# Patient Record
Sex: FEMALE | Race: WHITE | Employment: FULL TIME | RURAL
[De-identification: names, ages, dates, MRNs, and addresses within clinical notes are randomized per-mention and may not be internally consistent; named-entity substitution may affect disease eponyms.]

---

## 2017-01-05 DIAGNOSIS — F90.9 ADULT ADHD: Primary | ICD-10-CM

## 2017-02-01 DIAGNOSIS — F90.9 ADULT ADHD: ICD-10-CM

## 2017-05-22 ENCOUNTER — TELEPHONE (OUTPATIENT)
Dept: FAMILY MEDICINE CLINIC | Age: 37
End: 2017-05-22

## 2017-05-22 RX ORDER — BETAMETHASONE DIPROPIONATE 0.5 MG/G
LOTION TOPICAL 2 TIMES DAILY
Qty: 60 ML | Refills: 0 | Status: SHIPPED | OUTPATIENT
Start: 2017-05-22 | End: 2017-09-28

## 2017-09-28 ENCOUNTER — OFFICE VISIT (OUTPATIENT)
Dept: OBGYN CLINIC | Age: 37
End: 2017-09-28

## 2017-09-28 VITALS
WEIGHT: 171.8 LBS | HEIGHT: 65 IN | SYSTOLIC BLOOD PRESSURE: 140 MMHG | BODY MASS INDEX: 28.62 KG/M2 | DIASTOLIC BLOOD PRESSURE: 92 MMHG

## 2017-09-28 DIAGNOSIS — L70.0 ACNE VULGARIS: ICD-10-CM

## 2017-09-28 DIAGNOSIS — Z01.419 ENCOUNTER FOR GYNECOLOGICAL EXAMINATION (GENERAL) (ROUTINE) WITHOUT ABNORMAL FINDINGS: Primary | ICD-10-CM

## 2017-09-28 RX ORDER — DESOGESTREL AND ETHINYL ESTRADIOL 0.15-0.03
1 KIT ORAL DAILY
Qty: 3 PACKAGE | Refills: 4 | Status: SHIPPED | OUTPATIENT
Start: 2017-09-28 | End: 2017-10-17

## 2017-09-28 NOTE — PROGRESS NOTES
Giuseppe Lam is a ,  40 y.o. female [facility] whose Patient's last menstrual period was 2017 (approximate). who presents for her annual checkup. She is having no significant problems. Concerned about increasing acne. With regard to the Gardisil vaccine, she is older than the FDA approved age to receive it. Menstrual status:    Her periods are moderate in flow. She is using three to five pads or tampons per day, usually regular. She denies dysmenorrhea. She reports no premenstrual symptoms. Contraception:    The current method of family planning is condoms always and She would like to discuss contraception. Sexual history:    She  reports that she currently engages in sexual activity and has had male partners. She reports using the following method of birth control/protection: Condom. Medical conditions:    Since her last annual GYN exam about 2016 ago, she has not the following changes in her health history: none. Pap and Mammogram History:    Her most recent Pap smear was abnormal, ASCUS/-HPV obtained 2016 year(s) ago. The patient has never had a mammogram. She would like to discuss if she needs to get a baseline mammogram this year or next year. The patient does have a family history of breast cancer. Patients mother. History reviewed. No pertinent past medical history. History reviewed. No pertinent surgical history. Current Outpatient Prescriptions   Medication Sig Dispense Refill    cetirizine (ZYRTEC) 10 mg tablet Take 1 Tab by mouth daily. 30 Tab 1    betamethasone dipropionate (DIPROLENE) 0.05 % topical lotion Apply  to affected area two (2) times a day. 60 mL 0    norethindrone-e estradiol-iron (LOMEDIA 24 FE) 1 mg-20 mcg (24)/75 mg (4) tab Take 1 Tab by mouth daily.  28 Tab 2     Allergies: Ceclor [cefaclor] and Penicillin g   Social History     Social History    Marital status:      Spouse name: N/A    Number of children: N/A    Years of education: N/A     Occupational History    Not on file. Social History Main Topics    Smoking status: Former Smoker     Packs/day: 0.25     Years: 10.00     Types: Cigarettes     Quit date: 6/16/2015    Smokeless tobacco: Never Used    Alcohol use No    Drug use: No    Sexual activity: Yes     Partners: Male     Birth control/ protection: Condom     Other Topics Concern    Not on file     Social History Narrative     Tobacco History:  reports that she quit smoking about 2 years ago. Her smoking use included Cigarettes. She has a 2.50 pack-year smoking history. She has never used smokeless tobacco.  Alcohol Abuse:  reports that she does not drink alcohol. Drug Abuse:  reports that she does not use illicit drugs.     Patient Active Problem List   Diagnosis Code    Contraception management Z30.9       Review of Systems - History obtained from the patient  Constitutional: negative for weight loss, fever, night sweats  HEENT: negative for hearing loss, earache, congestion, snoring, sorethroat  CV: negative for chest pain, palpitations, edema  Resp: negative for cough, shortness of breath, wheezing  GI: negative for change in bowel habits, abdominal pain, black or bloody stools  : negative for frequency, dysuria, hematuria, vaginal discharge  MSK: negative for back pain, joint pain, muscle pain  Breast: negative for breast lumps, nipple discharge, galactorrhea  Skin :negative for itching, rash, hives  Neuro: negative for dizziness, headache, confusion, weakness  Psych: negative for anxiety, depression, change in mood  Heme/lymph: negative for bleeding, bruising, pallor    Physical Exam    Visit Vitals    BP (!) 140/92    Ht 5' 5\" (1.651 m)    Wt 171 lb 12.8 oz (77.9 kg)    LMP 09/01/2017 (Approximate)    BMI 28.59 kg/m2       Constitutional  · Appearance: well-nourished, well developed, alert, in no acute distress    HENT  · Head and Face: appears normal    Neck  · Inspection/Palpation: normal appearance, no masses or tenderness  · Lymph Nodes: no lymphadenopathy present  · Thyroid: gland size normal, nontender, no nodules or masses present on palpation    Chest  · Respiratory Effort: breathing normal  · Auscultation: normal breath sounds    Cardiovascular  · Heart:  · Auscultation: regular rate and rhythm without murmur    Breasts  · Inspection of Breasts: breasts symmetrical, no skin changes, no discharge present, nipple appearance normal, no skin retraction present  · Palpation of Breasts and Axillae: no masses present on palpation, no breast tenderness  · Axillary Lymph Nodes: no lymphadenopathy present    Gastrointestinal  · Abdominal Examination: abdomen non-tender to palpation, normal bowel sounds, no masses present  · Liver and spleen: no hepatomegaly present, spleen not palpable  · Hernias: no hernias identified    Genitourinary  · External Genitalia: normal appearance for age, no discharge present, no tenderness present, no inflammatory lesions present, no masses present, no atrophy present  · Vagina: normal vaginal vault without central or paravaginal defects, no discharge present, no inflammatory lesions present, no masses present  · Bladder: non-tender to palpation  · Urethra: appears normal  · Cervix: normal   · Uterus: normal size, shape and consistency  · Adnexa: no adnexal tenderness present, no adnexal masses present  · Perineum: perineum within normal limits, no evidence of trauma, no rashes or skin lesions present  · Anus: anus within normal limits, no hemorrhoids present  · Inguinal Lymph Nodes: no lymphadenopathy present    Skin  · General Inspection: no rash, no lesions identified    Neurologic/Psychiatric  · Mental Status:  · Orientation: grossly oriented to person, place and time  · Mood and Affect: mood normal, affect appropriate    .   Assessment:  Routine gynecologic examination  Her current medical status is satisfactory with no evidence of significant gynecologic issues.   Acne  ASCUS HPV neg 2016  Plan:  Counseled re: diet, exercise, healthy lifestyle  Return for yearly wellness visits  Start OC

## 2017-09-28 NOTE — PATIENT INSTRUCTIONS
Breast Self-Exam: Care Instructions  Your Care Instructions  A breast self-exam is when you check your breasts for lumps or changes. This regular exam helps you learn how your breasts normally look and feel. Most breast problems or changes are not because of cancer. Breast self-exam is not a substitute for a mammogram. Having regular breast exams by your doctor and regular mammograms improve your chances of finding any problems with your breasts. Some women set a time each month to do a step-by-step breast self-exam. Other women like a less formal system. They might look at their breasts as they brush their teeth, or feel their breasts once in a while in the shower. If you notice a change in your breast, tell your doctor. Follow-up care is a key part of your treatment and safety. Be sure to make and go to all appointments, and call your doctor if you are having problems. Its also a good idea to know your test results and keep a list of the medicines you take. How do you do a breast self-exam?  · The best time to examine your breasts is usually one week after your menstrual period begins. Your breasts should not be tender then. If you do not have periods, you might do your exam on a day of the month that is easy to remember. · To examine your breasts:  ¨ Remove all your clothes above the waist and lie down. When you are lying down, your breast tissue spreads evenly over your chest wall, which makes it easier to feel all your breast tissue. ¨ Use the pads--not the fingertips--of the 3 middle fingers of your left hand to check your right breast. Move your fingers slowly in small coin-sized circles that overlap. ¨ Use three levels of pressure to feel of all your breast tissue. Use light pressure to feel the tissue close to the skin surface. Use medium pressure to feel a little deeper. Use firm pressure to feel your tissue close to your breastbone and ribs.  Use each pressure level to feel your breast tissue before moving on to the next spot. ¨ Check your entire breast, moving up and down as if following a strip from the collarbone to the bra line, and from the armpit to the ribs. Repeat until you have covered the entire breast.  ¨ Repeat this procedure for your left breast, using the pads of the 3 middle fingers of your right hand. · To examine your breasts while in the shower:  ¨ Place one arm over your head and lightly soap your breast on that side. ¨ Using the pads of your fingers, gently move your hand over your breast (in the strip pattern described above), feeling carefully for any lumps or changes. ¨ Repeat for the other breast.  · Have your doctor inspect anything you notice to see if you need further testing. Where can you learn more? Go to http://mary kay-yung.info/. Enter P148 in the search box to learn more about \"Breast Self-Exam: Care Instructions. \"  Current as of: July 26, 2016  Content Version: 11.3  © 0518-8667 [employer]. Care instructions adapted under license by [employer] (which disclaims liability or warranty for this information). If you have questions about a medical condition or this instruction, always ask your healthcare professional. [place] [address] any warranty or liability for your use of this information.

## 2017-10-01 LAB
CHLAMYDIA, EXTERNAL: NEGATIVE
N. GONORRHEA, EXTERNAL: NEGATIVE

## 2017-10-17 ENCOUNTER — INITIAL PRENATAL (OUTPATIENT)
Dept: FAMILY MEDICINE CLINIC | Age: 37
End: 2017-10-17

## 2017-10-17 VITALS
WEIGHT: 188 LBS | BODY MASS INDEX: 31.32 KG/M2 | HEIGHT: 65 IN | SYSTOLIC BLOOD PRESSURE: 123 MMHG | TEMPERATURE: 97.1 F | DIASTOLIC BLOOD PRESSURE: 75 MMHG | RESPIRATION RATE: 20 BRPM | HEART RATE: 77 BPM | OXYGEN SATURATION: 98 %

## 2017-10-17 DIAGNOSIS — K21.9 GASTROESOPHAGEAL REFLUX DISEASE WITHOUT ESOPHAGITIS: ICD-10-CM

## 2017-10-17 DIAGNOSIS — Z3A.01 LESS THAN 8 WEEKS GESTATION OF PREGNANCY: ICD-10-CM

## 2017-10-17 DIAGNOSIS — Z3A.01 LESS THAN 8 WEEKS GESTATION OF PREGNANCY: Primary | ICD-10-CM

## 2017-10-17 DIAGNOSIS — O09.521 SUPERVISION OF ELDERLY MULTIGRAVIDA IN FIRST TRIMESTER: ICD-10-CM

## 2017-10-17 DIAGNOSIS — Z87.59 HISTORY OF PRE-ECLAMPSIA: ICD-10-CM

## 2017-10-17 DIAGNOSIS — Z71.6 ENCOUNTER FOR TOBACCO USE CESSATION COUNSELING: ICD-10-CM

## 2017-10-17 LAB
BILIRUB UR QL STRIP: NEGATIVE
GLUCOSE UR-MCNC: NEGATIVE MG/DL
KETONES P FAST UR STRIP-MCNC: NEGATIVE MG/DL
PH UR STRIP: 5.5 [PH] (ref 4.6–8)
PROT UR QL STRIP: NEGATIVE MG/DL
SP GR UR STRIP: 1.03 (ref 1–1.03)
UA UROBILINOGEN AMB POC: NORMAL (ref 0.2–1)
URINALYSIS CLARITY POC: CLEAR
URINALYSIS COLOR POC: YELLOW
URINE BLOOD POC: NEGATIVE
URINE LEUKOCYTES POC: NORMAL
URINE NITRITES POC: NEGATIVE

## 2017-10-17 RX ORDER — RANITIDINE HCL 75 MG
75 TABLET ORAL 2 TIMES DAILY
Qty: 30 TAB | Refills: 3 | Status: SHIPPED | OUTPATIENT
Start: 2017-10-17 | End: 2018-07-25

## 2017-10-17 NOTE — PATIENT INSTRUCTIONS
Weeks 6 to 10 of Your Pregnancy: Care Instructions  Your Care Instructions    Congratulations on your pregnancy. This is an exciting and important time for you. During the first 6 to 10 weeks of your pregnancy, your body goes through many changes. Your baby grows very fast, even though you cannot feel it yet. You may start to notice that you feel different, both in your body and your emotions. Because each woman's pregnancy is unique, there is no right way to feel. You may feel the healthiest you have ever been, or you may feel tired or sick to your stomach (\"morning sickness\"). These early weeks are a time to make healthy choices and to eat the best foods for you and your baby. This care sheet will give you some ideas. This is also a good time to think about birth defects testing. These are tests done during pregnancy to look for possible problems with the baby. First trimester tests for birth defects can be done between 8 and 17 weeks of pregnancy, depending on the test. Talk with your doctor about what kinds of tests are available. Follow-up care is a key part of your treatment and safety. Be sure to make and go to all appointments, and call your doctor if you are having problems. It's also a good idea to know your test results and keep a list of the medicines you take. How can you care for yourself at home? Eat well  · Eat at least 3 meals and 2 healthy snacks every day. Eat fresh, whole foods, including:  ¨ 7 or more servings of bread, tortillas, cereal, rice, pasta, or oatmeal.  ¨ 3 or more servings of vegetables, especially leafy green vegetables. ¨ 2 or more servings of fruits. ¨ 3 or more servings of milk, yogurt, or cheese. ¨ 2 or more servings of meat, turkey, chicken, fish, eggs, or dried beans. · Drink plenty of fluids, especially water. Avoid sodas and other sweetened drinks. · Choose foods that have important vitamins for your baby, such as calcium, iron, and folate.   ¨ Dairy products, tofu, canned fish with bones, almonds, broccoli, dark leafy greens, corn tortillas, and fortified orange juice are good sources of calcium. ¨ Beef, poultry, liver, spinach, lentils, dried beans, fortified cereals, and dried fruits are rich in iron. ¨ Dark leafy greens, broccoli, asparagus, liver, fortified cereals, orange juice, peanuts, and almonds are good sources of folate. · Avoid foods that could harm your baby. ¨ Do not eat raw or undercooked meat, chicken, or fish (such as sushi or raw oysters). ¨ Do not eat raw eggs or foods that contain raw eggs, such as Caesar dressing. ¨ Do not eat soft cheeses and unpasteurized dairy foods, such as Brie, feta, or blue cheese. ¨ Do not eat fish that contains a lot of mercury, such as shark, swordfish, tilefish, or koko mackerel. Do not eat more than 6 ounces of tuna each week. ¨ Do not eat raw sprouts, especially alfalfa sprouts. ¨ Cut down on caffeine, such as coffee, tea, and cola. Protect yourself and your baby  · Do not touch behzad litter or cat feces. They can cause an infection that could harm your baby. · High body temperature can be harmful to your baby. So if you want to use a sauna or hot tub, be sure to talk to your doctor about how to use it safely. [place] with morning sickness  · Sip small amounts of water, juices, or shakes. Try drinking between meals, not with meals. · Eat 5 or 6 small meals a day. Try dry toast or crackers when you first get up, and eat breakfast a little later. · Avoid spicy, greasy, and fatty foods. · When you feel sick, open your windows or go for a short walk to get fresh air. · Try nausea wristbands. These help some women. · Tell your doctor if you think your prenatal vitamins make you sick. Where can you learn more? Go to http://eri.info/. Enter G112 in the search box to learn more about \"Weeks 6 to 10 of Your Pregnancy: Care Instructions. \"  Current as of: March 16, 2017  Content Version: 11.3  © 7675-8827 [employer]. Care instructions adapted under license by [employer] (which disclaims liability or warranty for this information). If you have questions about a medical condition or this instruction, always ask your healthcare professional. [address] any warranty or liability for your use of this information. Gastroesophageal Reflux Disease (GERD): Care Instructions  Your Care Instructions    Gastroesophageal reflux disease (GERD) is the backward flow of stomach acid into the esophagus. The esophagus is the tube that leads from your throat to your stomach. A one-way valve prevents the stomach acid from moving up into this tube. When you have GERD, this valve does not close tightly enough. If you have mild GERD symptoms including heartburn, you may be able to control the problem with antacids or over-the-counter medicine. Changing your diet, losing weight, and making other lifestyle changes can also help reduce symptoms. Follow-up care is a key part of your treatment and safety. Be sure to make and go to all appointments, and call your doctor if you are having problems. Its also a good idea to know your test results and keep a list of the medicines you take. How can you care for yourself at home? · Take your medicines exactly as prescribed. Call your doctor if you think you are having a problem with your medicine. · Your doctor may recommend over-the-counter medicine. For mild or occasional indigestion, antacids, such as Tums, Gaviscon, Mylanta, or Maalox, may help. Your doctor also may recommend over-the-counter acid reducers, such as Pepcid AC, Tagamet HB, Zantac 75, or Prilosec. Read and follow all instructions on the label. If you use these medicines often, talk with your doctor. · Change your eating habits. ¨ Its best to eat several small meals instead of two or three large meals.   ¨ After you eat, wait 2 to 3 hours before you lie down. ¨ Chocolate, mint, and alcohol can make GERD worse. ¨ Spicy foods, foods that have a lot of acid (like tomatoes and oranges), and coffee can make GERD symptoms worse in some people. If your symptoms are worse after you eat a certain food, you may want to stop eating that food to see if your symptoms get better. · Do not smoke or chew tobacco. Smoking can make GERD worse. If you need help quitting, talk to your doctor about stop-smoking programs and medicines. These can increase your chances of quitting for good. · If you have GERD symptoms at night, raise the head of your bed 6 to 8 inches by putting the frame on blocks or placing a foam wedge under the head of your mattress. (Adding extra pillows does not work.)  · Do not wear tight clothing around your middle. · Lose weight if you need to. Losing just 5 to 10 pounds can help. When should you call for help? Call your doctor now or seek immediate medical care if:  · You have new or different belly pain. · Your stools are black and tarlike or have streaks of blood. Watch closely for changes in your health, and be sure to contact your doctor if:  · Your symptoms have not improved after 2 days. · Food seems to catch in your throat or chest.  Where can you learn more? Go to http://mary kay-yung.info/. Enter O333 in the search box to learn more about \"Gastroesophageal Reflux Disease (GERD): Care Instructions. \"  Current as of: August 9, 2016  Content Version: 11.3  © 9142-7185 [facility]. Care instructions adapted under license by [employer] (which disclaims liability or warranty for this information). If you have questions about a medical condition or this instruction, always ask your healthcare professional. [address] any warranty or liability for your use of this information.

## 2017-10-17 NOTE — MR AVS SNAPSHOT
Visit Information Date & Time Provider Department Dept. Phone Encounter #  
 10/17/2017  2:50 PM Dania Bustillos  [facility] [phone] [id number] Follow-up Instructions Return in about 4 weeks (around 11/14/2017) for Routine ob visit blood work this visit along with panorama. Your Appointments 10/17/2017  2:50 PM  
NEW OB Patient with Dania Bustillos MD  
[facility] ([facility]) Appt Note: Initial Prenatal  
 [address] [address]  
[place] [address] [address] Upcoming Health Maintenance Date Due DTaP/Tdap/Td series (2 - Td) 12/28/2020 PAP AKA CERVICAL CYTOLOGY 1/7/2021 Allergies as of 10/17/2017  Review Complete On: 10/17/2017 By: Madelin Liriano Severity Noted Reaction Type Reactions Ceclor [Cefaclor]  04/18/2012    Hives Penicillin G  04/18/2012    Hives Current Immunizations  Reviewed on 5/29/2013 Name Date Influenza Vaccine 10/3/2017 MMR Vaccine 3/25/2011 Tdap 12/28/2010 Not reviewed this visit You Were Diagnosed With   
  
 Codes Comments Less than 8 weeks gestation of pregnancy    -  Primary ICD-10-CM: Z3A.01 
ICD-9-CM: V22.2 Supervision of elderly primigravida (>=28years old at delivery), first trimester     ICD-10-CM: O09.511 ICD-9-CM: V23.81 Gastroesophageal reflux disease without esophagitis     ICD-10-CM: K21.9 ICD-9-CM: 530.81 Vitals BP Pulse Temp Resp Height(growth percentile) Weight(growth percentile) 123/75 77 97.1 °F (36.2 °C) (Oral) 20 5' 5\" (1.651 m) 188 lb (85.3 kg) LMP SpO2 BMI OB Status Smoking Status 09/01/2017 (Approximate) 98% 31.28 kg/m2 Pregnant Former Smoker Vitals History BMI and BSA Data Body Mass Index Body Surface Area  
 31.28 kg/m 2 1.98 m 2 Preferred Pharmacy Pharmacy Name Phone [facility] [address] [place] [address] [phone] Your Updated Medication List  
  
   
This list is accurate as of: 10/17/17  1:10 PM.  Always use your most recent med list.  
  
  
  
  
 cetirizine 10 mg tablet Commonly known as:  ZYRTEC Take 1 Tab by mouth daily. desogestrel-ethinyl estradiol 0.15-0.03 mg Tab Commonly known as:  DESOGEN Take 1 Tab by mouth daily for 90 days. PRENATAL VITAMIN PO Take  by mouth. raNITIdine 75 mg tablet Commonly known as:  ZANTAC Take 1 Tab by mouth two (2) times a day. Prescriptions Sent to Pharmacy Refills  
 raNITIdine (ZANTAC) 75 mg tablet 3 Sig: Take 1 Tab by mouth two (2) times a day. Class: Normal  
 Pharmacy: [address] [place] [address] Ph #: [phone] Route: Oral  
  
We Performed the Following AMB POC URINALYSIS DIP STICK AUTO W/O MICRO [04895 CPT(R)] AMB POC US OB < 14 WKS, 1ST GESTATION [21048 CPT(R)] ANTIBODY SCREEN [id number] CPT(R)] CBC W/O DIFF [31216 CPT(R)] CULTURE, URINE [id number] CPT(R)] HIV 1/2 AG/AB, 4TH GENERATION,W RFLX CONFIRM [[id number] Custom] [address] [id number] Custom] RUBELLA AB, IGG [id number] CPT(R)] T PALLIDUM AB [[id number] Custom] TYPE & SCREEN [[id number] Custom] VZV AB, IGG [id number] CPT(R)] Follow-up Instructions Return in about 4 weeks (around 11/14/2017) for Routine ob visit blood work this visit along with panorama. To-Do List   
 10/17/2017 Lab:  HEP B SURFACE AG Patient Instructions Weeks 6 to 10 of Your Pregnancy: Care Instructions Your Care Instructions Congratulations on your pregnancy. This is an exciting and important time for you. During the first 6 to 10 weeks of your pregnancy, your body goes through many changes.  Your baby grows very fast, even though you cannot feel it yet. You may start to notice that you feel different, both in your body and your emotions. Because each woman's pregnancy is unique, there is no right way to feel. You may feel the healthiest you have ever been, or you may feel tired or sick to your stomach (\"morning sickness\"). These early weeks are a time to make healthy choices and to eat the best foods for you and your baby. This care sheet will give you some ideas. This is also a good time to think about birth defects testing. These are tests done during pregnancy to look for possible problems with the baby. First trimester tests for birth defects can be done between 8 and 17 weeks of pregnancy, depending on the test. Talk with your doctor about what kinds of tests are available. Follow-up care is a key part of your treatment and safety. Be sure to make and go to all appointments, and call your doctor if you are having problems. It's also a good idea to know your test results and keep a list of the medicines you take. How can you care for yourself at home? Eat well · Eat at least 3 meals and 2 healthy snacks every day. Eat fresh, whole foods, including: ¨ 7 or more servings of bread, tortillas, cereal, rice, pasta, or oatmeal. 
¨ 3 or more servings of vegetables, especially leafy green vegetables. ¨ 2 or more servings of fruits. ¨ 3 or more servings of milk, yogurt, or cheese. ¨ 2 or more servings of meat, turkey, chicken, fish, eggs, or dried beans. · Drink plenty of fluids, especially water. Avoid sodas and other sweetened drinks. · Choose foods that have important vitamins for your baby, such as calcium, iron, and folate. ¨ Dairy products, tofu, canned fish with bones, almonds, broccoli, dark leafy greens, corn tortillas, and fortified orange juice are good sources of calcium. ¨ Beef, poultry, liver, spinach, lentils, dried beans, fortified cereals, and dried fruits are rich in iron. ¨ Dark leafy greens, broccoli, asparagus, liver, fortified cereals, orange juice, peanuts, and almonds are good sources of folate. · Avoid foods that could harm your baby. ¨ Do not eat raw or undercooked meat, chicken, or fish (such as sushi or raw oysters). ¨ Do not eat raw eggs or foods that contain raw eggs, such as Caesar dressing. ¨ Do not eat soft cheeses and unpasteurized dairy foods, such as Brie, feta, or blue cheese. ¨ Do not eat fish that contains a lot of mercury, such as shark, swordfish, tilefish, or koko mackerel. Do not eat more than 6 ounces of tuna each week. ¨ Do not eat raw sprouts, especially alfalfa sprouts. ¨ Cut down on caffeine, such as coffee, tea, and cola. Protect yourself and your baby · Do not touch behzad litter or cat feces. They can cause an infection that could harm your baby. · High body temperature can be harmful to your baby. So if you want to use a sauna or hot tub, be sure to talk to your doctor about how to use it safely. Steger with morning sickness · Sip small amounts of water, juices, or shakes. Try drinking between meals, not with meals. · Eat 5 or 6 small meals a day. Try dry toast or crackers when you first get up, and eat breakfast a little later. · Avoid spicy, greasy, and fatty foods. · When you feel sick, open your windows or go for a short walk to get fresh air. · Try nausea wristbands. These help some women. · Tell your doctor if you think your prenatal vitamins make you sick. Where can you learn more? Go to http://mary kay-yung.info/. Enter G112 in the search box to learn more about \"Weeks 6 to 10 of Your Pregnancy: Care Instructions. \" Current as of: March 16, 2017 Content Version: 11.3 © 5585-2206 [employer]. Care instructions adapted under license by [employer] (which disclaims liability or warranty for this information).  If you have questions about a medical condition or this instruction, always ask your healthcare professional. [place] [address] any warranty or liability for your use of this information. Gastroesophageal Reflux Disease (GERD): Care Instructions Your Care Instructions Gastroesophageal reflux disease (GERD) is the backward flow of stomach acid into the esophagus. The esophagus is the tube that leads from your throat to your stomach. A one-way valve prevents the stomach acid from moving up into this tube. When you have GERD, this valve does not close tightly enough. If you have mild GERD symptoms including heartburn, you may be able to control the problem with antacids or over-the-counter medicine. Changing your diet, losing weight, and making other lifestyle changes can also help reduce symptoms. Follow-up care is a key part of your treatment and safety. Be sure to make and go to all appointments, and call your doctor if you are having problems. Its also a good idea to know your test results and keep a list of the medicines you take. How can you care for yourself at home? · Take your medicines exactly as prescribed. Call your doctor if you think you are having a problem with your medicine. · Your doctor may recommend over-the-counter medicine. For mild or occasional indigestion, antacids, such as Tums, Gaviscon, Mylanta, or Maalox, may help. Your doctor also may recommend over-the-counter acid reducers, such as Pepcid AC, Tagamet HB, Zantac 75, or Prilosec. Read and follow all instructions on the label. If you use these medicines often, talk with your doctor. · Change your eating habits. ¨ Its best to eat several small meals instead of two or three large meals. ¨ After you eat, wait 2 to 3 hours before you lie down. ¨ Chocolate, mint, and alcohol can make GERD worse. ¨ Spicy foods, foods that have a lot of acid (like tomatoes and oranges), and coffee can make GERD symptoms worse in some people.  If your symptoms are worse after you eat a certain food, you may want to stop eating that food to see if your symptoms get better. · Do not smoke or chew tobacco. Smoking can make GERD worse. If you need help quitting, talk to your doctor about stop-smoking programs and medicines. These can increase your chances of quitting for good. · If you have GERD symptoms at night, raise the head of your bed 6 to 8 inches by putting the frame on blocks or placing a foam wedge under the head of your mattress. (Adding extra pillows does not work.) · Do not wear tight clothing around your middle. · Lose weight if you need to. Losing just 5 to 10 pounds can help. When should you call for help? Call your doctor now or seek immediate medical care if: 
· You have new or different belly pain. · Your stools are black and tarlike or have streaks of blood. Watch closely for changes in your health, and be sure to contact your doctor if: 
· Your symptoms have not improved after 2 days. · Food seems to catch in your throat or chest. 
Where can you learn more? Go to http://mary kay-yung.info/. Enter Z254 in the search box to learn more about \"Gastroesophageal Reflux Disease (GERD): Care Instructions. \" Current as of: August 9, 2016 Content Version: 11.3 © 0352-4585 PeopleJar. Care instructions adapted under license by [employer] (which disclaims liability or warranty for this information). If you have questions about a medical condition or this instruction, always ask your healthcare professional. [place] [address] any warranty or liability for your use of this information. Introducing [facility]! [facility] introduces Redeem patient portal. Now you can access parts of your medical record, email your doctor's office, and request medication refills online. 1. In your internet browser, go to https://[email or web address] Your Last Chance/[employer] 2. Click on the First Time User? Click Here link in the Sign In box. You will see the New Member Sign Up page. 3. Enter your Vivox Access Code exactly as it appears below. You will not need to use this code after youve completed the sign-up process. If you do not sign up before the expiration date, you must request a new code. · Vivox Access Code: [email or web address] Expires: 1/15/2018  1:10 PM 
 
4. Enter the last four digits of your Social Security Number (xxxx) and Date of Birth (mm/dd/yyyy) as indicated and click Submit. You will be taken to the next sign-up page. 5. Create a Vivox ID. This will be your Vivox login ID and cannot be changed, so think of one that is secure and easy to remember. 6. Create a Vivox password. You can change your password at any time. 7. Enter your Password Reset Question and Answer. This can be used at a later time if you forget your password. 8. Enter your e-mail address. You will receive e-mail notification when new information is available in [address] 9. Click Sign Up. You can now view and download portions of your medical record. 10. Click the Download Summary menu link to download a portable copy of your medical information. If you have questions, please visit the Frequently Asked Questions section of the Vivox website. Remember, Vivox is NOT to be used for urgent needs. For medical emergencies, dial 911. Now available from your iPhone and Android! Please provide this summary of care documentation to your next provider. Your primary care clinician is listed as Sabino Dixon. If you have any questions after today's visit, please call [phone].

## 2017-10-17 NOTE — PROGRESS NOTES
Subjective:   Bailee Horne is a 40 y.o. female who is being seen today for her first obstetrical visit. Pt complains of symptoms of occasional GERD. Denies HA, chest pain , shortness of breath, lower extremity swelling, vision changes, RUQ abdominal pain, dysuria,  This is not a planned pregnancy. She is at 6 week 4 days gestation by LMP 2017. [id number]  ·  full term pregnancy, 38 weeks  - Delivery method: C/S due fetal position. Fetal weight: 7lbs 12 oz . Complications: Preeclampsia, pt was induced . ·   Missed : D&C , [facility]      ·  G1 tubal  pregnancy , patient had a emergency D& C, tube was saved at [facility]          History of GDM or DM? Denies     History of GHTN or HTN? No history of HTN / GHTN    History of pre-eclampsia? Yes ,     Relationship with FOB: spouse, living together. Pt is taking prenatal vitamins. Desires first trimester dating ultrasound? Yes     Desires second trimester fetal anatomy ultrasound? Yes     Desires genetic testing for Down's Syndrome? Yes       Allergies- reviewed: Allergies   Allergen Reactions    Ceclor [Cefaclor] Hives    Penicillin G Hives         Medications- reviewed:   Current Outpatient Prescriptions   Medication Sig    PRENATAL VIT CALC,IRON,FOLIC (PRENATAL VITAMIN PO) Take  by mouth.  raNITIdine (ZANTAC) 75 mg tablet Take 1 Tab by mouth two (2) times a day.  cetirizine (ZYRTEC) 10 mg tablet Take 1 Tab by mouth daily. No current facility-administered medications for this visit. Past Medical History- reviewed:  History reviewed. No pertinent past medical history. Past Surgical History- reviewed:   History reviewed. No pertinent surgical history. Social History- reviewed:  Social History     Social History    Marital status:      Spouse name: N/A    Number of children: N/A    Years of education: N/A     Occupational History    Not on file.      Social History Main Topics    Smoking status: Former Smoker     Packs/day: 0.25     Years: 10.00     Types: Cigarettes     Quit date: 6/16/2015    Smokeless tobacco: Never Used    Alcohol use No    Drug use: No    Sexual activity: Yes     Partners: Male     Birth control/ protection: Condom     Other Topics Concern    Not on file     Social History Narrative         Immunizations- reviewed:   Immunization History   Administered Date(s) Administered    Influenza Vaccine 10/03/2017    MMR Vaccine 03/25/2011    Tdap 12/28/2010     Flu vaccine Given  On 10/3/2017  Tdap: Will be given during third trimester       RO  : Denies vaginal bleeding and leaking of fluid  GI: Endorses symptoms of occasional GERD, denies nausea and vomiting      Objective:     Visit Vitals    /75    Pulse 77    Temp 97.1 °F (36.2 °C) (Oral)    Resp 20    Ht 5' 5\" (1.651 m)    Wt 188 lb (85.3 kg)    LMP 09/01/2017 (Approximate)    SpO2 98%    BMI 31.28 kg/m2       Physical Exam:  GENERAL APPEARANCE: alert, well appearing, in no apparent distress  HEAD: normocephalic, atraumatic   LUNGS: clear to auscultation, no wheezes, rales or rhonchi, symmetric air entry  HEART: regular rate and rhythm, no murmurs  ABDOMEN: Nontender, BS wnl   BACK: no CVA tenderness  EXTERNAL GENITALIA: normal appearing vulva with no masses, tenderness or lesions, freddy vaginal discharge   Transvaginal ultrasound : FHT 150s. Fetal pole present, CRL c/w 7 weeks 3 days.    VAGINA: no abnormal discharge or lesions  CERVIX: no lesions or cervical motion tenderness  UTERUS: gravid   ADNEXA: no masses palpable and nontender  EXTREMITIES: no redness or tenderness in the calves or thighs, no edema  NEUROLOGICAL: alert, oriented, normal speech, no focal findings or movement disorder noted  SKIN: normal coloration and turgor, no rashes,    Exam chaperoned by Dr. Karyna Bloom, Nurse : Kaylin Outlaw:    No results found for this or any previous visit (from the past 12 hour(s)). Assessment:     Pt is a 41 yo  who is at 6 weeks 4 days based on LMP , however,  Transvaginal ultrasound showing 7 weeks 3 days . ICD-10-CM ICD-9-CM    1. Less than 8 weeks gestation of pregnancy Z3A.01 V22.2 PRENATAL VIT CALC,IRON,FOLIC (PRENATAL VITAMIN PO)      AMB POC URINALYSIS DIP STICK AUTO W/O MICRO      NUSWAB VAGINITIS PLUS      HIV 1/2 AG/AB, 4TH GENERATION,W RFLX CONFIRM      CBC W/O DIFF      RUBELLA AB, IGG      VZV AB, IGG      TYPE & SCREEN      ANTIBODY SCREEN      HEP B SURFACE AG      CULTURE, URINE      T PALLIDUM AB   2. Supervision of elderly multigravida in first trimester O09.521 V23.82 AMB POC US OB < 14 WKS, 1ST GESTATION   3. Gastroesophageal reflux disease without esophagitis K21.9 530.81 raNITIdine (ZANTAC) 75 mg tablet   4. Encounter for tobacco use cessation counseling Z71.6 V65.42    5. History of pre-eclampsia Z87.59 V13.29          Plan:   · Initial labs/specimens collected (CBC, blood type & screen, Rh antibody screen, rubella titer, HBsAg titer, Tpallidum, HIV, UA w/ cx, pap smear, gonorrhea/chlamydia cx)  · NUSWAB+ : per pt request ;  g/c  · Pap smear : ASCUS HPV neg - repap in 3 years  · Pt request [facility] for genetic testing and gender testing   · Recommended prenatal vitamins  · Will schedule pt  for 1st trimester dating ultrasound. · Request for 2nd trimester fetal anatomy ultrasound faxed to [facility].   · Genetic testing for Down Syndrome to be performed during this pregnancy per patient request.  · Plans to breastfeed  · Plans to deliver [facility]     Will draw rosa and initial prenatal labs together       History of Preeclampsia   BP was wnl today, Pt is Asx   · Will begin low dose ASA at 12 weeks     GERD   · Pt was advised pt of lifestyle modifications , elevate the head of your bed, do not recline 30 mins after a meal.   · Ranitidine 75mg bid     AMA  · Advised pt to exercise daily, low fat, low salt,low sugar diet   · Pt desires genetic testing, will get  [facility]     Hx of C/s due to malpresentation  ( LTCS)  · Pt wants to try for      Hx of Tobacco Abuse   2.5 ppy, quit ~ 2 weeks ago. · Pt quit smoking as soon as she found out she was pregnant   Counseled on the effects of smoking to include   including spontaneous pregnancy loss, placental abruption,  premature rupture of membranes, placenta previa,  labor and delivery, low birth weight. · The patient was counseled on the dangers of tobacco use, and was advised to quit. Reviewed strategies to maximize success, including removing cigarettes and smoking materials from environment, stress management and support of family/friends. Hx of Obesity   · Discussed the patient's BMI with her. The BMI follow up plan is as follows: I have counseled this patient on diet and exercise regimens. Follow-up in 4 week(s) for Routine prenatal         Emergency contact info confirmed:   Cell: [phone]  Mom : Kaila Cheneyem    Orders Placed This Encounter    CULTURE, URINE    AMB POC US OB < 14 WKS, 1ST GESTATION     Order Specific Question:   Reason for Exam     Answer:   dating ul     Order Specific Question:   Is Patient Allergic to Contrast Dye? Answer:   No     Order Specific Question:   Is Patient Pregnant? Answer: Yes    NUSWAB VAGINITIS PLUS    HIV 1/2 AG/AB, 4TH GENERATION,W RFLX CONFIRM    CBC W/O DIFF    RUBELLA AB, IGG    VARICELLA-ZOSTER V AB, IGG    HEP B SURFACE AG     Standing Status:   Future     Number of Occurrences:   1     Standing Expiration Date:   10/18/2018    T PALLIDUM AB    AMB POC URINALYSIS DIP STICK AUTO W/O MICRO    TYPE & SCREEN     Order Specific Question:   Has patient been transfused or pregnant in the last 3 mos. ? Answer:   Unknown    ANTIBODY SCREEN    PRENATAL VIT CALC,IRON,FOLIC (PRENATAL VITAMIN PO)     Sig: Take  by mouth.     raNITIdine (ZANTAC) 75 mg tablet     Sig: Take 1 Tab by mouth two (2) times a day.     Dispense:  30 Tab     Refill:  3         I have discussed the diagnosis with the patient and the intended plan as seen in the above orders. The patient has received an after-visit summary and questions were answered concerning future plans. I have discussed medication side effects and warnings with the patient as well. Informed pt to return to the office or go to the ER if she experiences vaginal bleeding, vaginal discharge, leaking of fluid, pelvic cramping.       Pt seen and discussed with Dr. Lis Marie  (attending physician)    Noelle Ruiz MD  Resident, Family Medicine

## 2017-10-19 PROBLEM — Z72.0 TOBACCO ABUSE: Status: ACTIVE | Noted: 2017-10-19

## 2017-10-19 PROBLEM — K21.9 GASTROESOPHAGEAL REFLUX DISEASE WITHOUT ESOPHAGITIS: Status: ACTIVE | Noted: 2017-10-19

## 2017-10-19 PROBLEM — O09.521 ELDERLY MULTIGRAVIDA IN FIRST TRIMESTER: Status: ACTIVE | Noted: 2017-10-19

## 2017-10-19 PROBLEM — Z87.59 HISTORY OF PRE-ECLAMPSIA: Status: ACTIVE | Noted: 2017-10-19

## 2017-10-19 LAB — BACTERIA UR CULT: NO GROWTH

## 2017-10-20 DIAGNOSIS — N76.0 BACTERIAL VAGINOSIS: Primary | ICD-10-CM

## 2017-10-20 DIAGNOSIS — B96.89 BACTERIAL VAGINOSIS: Primary | ICD-10-CM

## 2017-10-20 LAB
A VAGINAE DNA VAG QL NAA+PROBE: ABNORMAL SCORE
BVAB2 DNA VAG QL NAA+PROBE: ABNORMAL SCORE
C ALBICANS DNA VAG QL NAA+PROBE: NEGATIVE
C GLABRATA DNA VAG QL NAA+PROBE: NEGATIVE
C TRACH RRNA SPEC QL NAA+PROBE: NEGATIVE
MEGA1 DNA VAG QL NAA+PROBE: ABNORMAL SCORE
N GONORRHOEA RRNA SPEC QL NAA+PROBE: NEGATIVE
T VAGINALIS RRNA SPEC QL NAA+PROBE: NEGATIVE

## 2017-10-20 RX ORDER — METRONIDAZOLE 500 MG/1
500 TABLET ORAL 2 TIMES DAILY
Qty: 14 TAB | Refills: 0 | Status: SHIPPED | OUTPATIENT
Start: 2017-10-20 | End: 2017-10-27

## 2017-10-20 NOTE — PROGRESS NOTES
I saw and evaluated the patient, performing the key elements of the service. I discussed the findings, assessment and plan with the resident and agree with the resident's findings and plan as documented in the resident's note. Transvaginal US performed today with CRL x3, average c/w 7w3d. Given h/o prior ectopic pregnancy, will send to [facility] for formal initial US with better evaluation of maternal structures.

## 2017-10-25 ENCOUNTER — HOSPITAL ENCOUNTER (OUTPATIENT)
Dept: PERINATAL CARE | Age: 37
Discharge: HOME | End: 2017-10-25
Attending: OBSTETRICS & GYNECOLOGY
Payer: COMMERCIAL

## 2017-10-25 PROCEDURE — 76815 OB US LIMITED FETUS(S): CPT | Performed by: OBSTETRICS & GYNECOLOGY

## 2017-10-26 ENCOUNTER — TELEPHONE (OUTPATIENT)
Dept: FAMILY MEDICINE CLINIC | Age: 37
End: 2017-10-26

## 2017-10-26 DIAGNOSIS — E78.2 MIXED HYPERLIPIDEMIA: Primary | ICD-10-CM

## 2017-10-26 LAB
ANTIBODY SCREEN, EXTERNAL: NEGATIVE
ANTIBODY SCREEN, EXTERNAL: POSITIVE
HBSAG, EXTERNAL: NON REACTIVE
HCT, EXTERNAL: 35.8
HGB, EXTERNAL: 12.1
HIV, EXTERNAL: NON REACTIVE
PLATELET CNT,   EXTERNAL: 219
RPR, EXTERNAL: NON REACTIVE
RUBELLA, EXTERNAL: NORMAL
TYPE, ABO & RH, EXTERNAL: NORMAL
TYPE, ABO & RH, EXTERNAL: NORMAL
VARICELLA, EXTERNAL: NORMAL

## 2017-11-14 ENCOUNTER — ROUTINE PRENATAL (OUTPATIENT)
Dept: FAMILY MEDICINE CLINIC | Age: 37
End: 2017-11-14

## 2017-11-14 VITALS
HEIGHT: 65 IN | TEMPERATURE: 98.7 F | RESPIRATION RATE: 16 BRPM | DIASTOLIC BLOOD PRESSURE: 72 MMHG | BODY MASS INDEX: 32.15 KG/M2 | HEART RATE: 82 BPM | OXYGEN SATURATION: 100 % | SYSTOLIC BLOOD PRESSURE: 114 MMHG | WEIGHT: 193 LBS

## 2017-11-14 DIAGNOSIS — O09.521 ELDERLY MULTIGRAVIDA IN FIRST TRIMESTER: ICD-10-CM

## 2017-11-14 DIAGNOSIS — Z72.0 TOBACCO ABUSE: ICD-10-CM

## 2017-11-14 DIAGNOSIS — Z3A.10 10 WEEKS GESTATION OF PREGNANCY: Primary | ICD-10-CM

## 2017-11-14 LAB
BILIRUB UR QL STRIP: NEGATIVE
GLUCOSE UR-MCNC: NEGATIVE MG/DL
KETONES P FAST UR STRIP-MCNC: NEGATIVE MG/DL
PH UR STRIP: 6 [PH] (ref 4.6–8)
PROT UR QL STRIP: NORMAL
SP GR UR STRIP: 1.03 (ref 1–1.03)
UA UROBILINOGEN AMB POC: NORMAL (ref 0.2–1)
URINALYSIS CLARITY POC: CLEAR
URINALYSIS COLOR POC: YELLOW
URINE BLOOD POC: NORMAL
URINE LEUKOCYTES POC: NORMAL
URINE NITRITES POC: NEGATIVE

## 2017-11-14 NOTE — PROGRESS NOTES
Return OB Visit       Subjective:   Hayes Eye 40 y.o. Eliza Marie   CEDRIC: 6/8/2018, by Last Menstrual Period  GA:  10w4d. No complaints at this time. Pt reports frontal headaches no vision changes   Tobacco Abuse : 1 -2 cigs per day within the past week   Stressors : recently getting , adjusting new family life  Diet: well balanced, healthy   Water intake: adequate   Prenatal Vitamins: taking     She is unsure if she is feeling her baby move yet. She denies vaginal bleeding, discharge or loss of fluid. She denies nausea, vomiting, severe abdominal pain or cramping. She denies dysuria. She denies headaches, dizziness or vision changes. She denies excessive swelling of extremities. Past Medical History - Reviewed today  Patient Active Problem List   Diagnosis Code    Contraception management Z30.9    Tobacco abuse Z72.0    Gastroesophageal reflux disease without esophagitis K21.9    Elderly multigravida in first trimester O09.521    History of pre-eclampsia Z87.59         Medications - Reviewed today  Current Outpatient Prescriptions   Medication Sig Dispense Refill    PRENATAL VIT CALC,IRON,FOLIC (PRENATAL VITAMIN PO) Take  by mouth.  cetirizine (ZYRTEC) 10 mg tablet Take 1 Tab by mouth daily. 30 Tab 1    raNITIdine (ZANTAC) 75 mg tablet Take 1 Tab by mouth two (2) times a day. 30 Tab 3         Allergies - Reviewed today  Allergies   Allergen Reactions    Ceclor [Cefaclor] Hives    Penicillin G Hives         Family History - Reviewed today  Family History   Problem Relation Age of Onset    Breast Cancer Mother     Heart Disease Mother     Cancer Father          Social History - Reviewed today  Social History     Social History    Marital status:      Spouse name: N/A    Number of children: N/A    Years of education: N/A     Occupational History    Not on file.      Social History Main Topics    Smoking status: Former Smoker     Packs/day: 0.25     Years: 10. 00     Types: Cigarettes     Quit date: 2015    Smokeless tobacco: Never Used    Alcohol use No    Drug use: No    Sexual activity: Yes     Partners: Male     Birth control/ protection: Condom     Other Topics Concern    Not on file     Social History Narrative         Health Maintenance - Reviewed today   Immunizations:     -Influenza: given on 10/3/2017     -Tdap: Will give during 3rd trimester      Screening:     -Pap smear: 2016: ASCUS , will repap in 3 years       Objective:     Visit Vitals    /72 (BP 1 Location: Left arm, BP Patient Position: Sitting)    Pulse 82    Temp 98.7 °F (37.1 °C) (Oral)    Resp 16    Ht 5' 5\" (1.651 m)    Wt 193 lb (87.5 kg)    LMP 2017 (Approximate)    SpO2 100%    BMI 32.12 kg/m2       Physical Exam:  GENERAL APPEARANCE: alert, well appearing, in no apparent distress  LUNGS: clear to auscultation, no wheezes, rales or rhonchi, symmetric air entry  HEART: regular rate and rhythm, no murmurs  ABDOMEN: soft, nontender, nondistended, no abnormal masses, no epigastric pain, fundus not palpable and FHT present, 140s bpm  BACK: no CVA tenderness  UTERUS: gravid, confirmed on bedside ultrasound   EXTREMITIES: no redness or tenderness in the calves or thighs, no edema  NEUROLOGICAL: alert, oriented, normal speech, no focal findings or movement disorder noted  PELVIC: examination not indicated. Assessment   Pt is 41 yo  who has SIUP at  10w4d c/w LMP and first trimester ultrasound. ICD-10-CM ICD-9-CM    1. 10 weeks gestation of pregnancy Z3A.10 V22.2 AMB POC URINALYSIS DIP STICK AUTO W/O MICRO      CULTURE, URINE   2. Elderly multigravida in first trimester O09.521 659.63    3.  Tobacco abuse Z72.0 305.1          Plan   SIUP   - U/A showed trace leuks  and trace protein   - First trimester ultrasound rescan scheduled 5 weeks from 10/25/17  - 2nd trimester screen for Down's Syndrome discussed: pt requested    - Per pt preference : will get Jamshid Robertson for genetic testing sex of the baby  - Follow up in 4 weeks    Hx of C/S 2/2 fetal position: ( 10 years ago)   - Pt is unsure if she wants a  or repeat C/S    Hx of PreE: Blood pressure wnl UA   - Will begin ASA at 12 weeks     GERD   - Pt was advised pt of lifestyle modifications , elevate the head of your bed, do not recline 30 mins after a meal.   - Ranitidine 75mg bid      AMA  - Advised pt to exercise daily, low fat, low salt,low sugar diet   - Pt desires genetic testing, will get  Jani De Luna Genoguzman Amaro      Hx of C/s due to malpresentation  ( LTCS)  - Pt wants to try for       Hx of Tobacco Abuse:  2.5 ppy  Pt is has been smoking off and on for the past week  - Pt quit smoking as soon as she found out she was pregnant   Counseled on the effects of smoking to include   including spontaneous pregnancy loss, placental abruption,  premature rupture of membranes, placenta previa,  labor and delivery, low birth weight. - The patient was counseled on the dangers of tobacco use, and was advised to quit. Reviewed strategies to maximize success, including removing cigarettes and smoking materials from environment, stress management and support of family/friends.        Hx of Obesity   Prepregnancy weight : 171 , Today : 193. Pt has gained 22 lbs since start of pregnancy. - Discussed the patient's BMI with her. The BMI follow up plan is as follows: I have counseled this patient on diet and exercise regimens. Orders Placed This Encounter    CULTURE, URINE    AMB POC URINALYSIS DIP STICK AUTO W/O MICRO         Labor precautions discussed, including: Regular painful contractions, lasting for greater than one hour, taking your breath away; any vaginal bleeding; any leakage of fluid; or absent or decreased fetal movement. Call M.D. on call if any of these symptoms or signs occur. I have discussed the diagnosis with the patient and the intended plan as seen in the above orders.   The patient has received an after-visit summary and questions were answered concerning future plans. I have discussed medication side effects and warnings with the patient as well.     Patient was seen and discussed  with Dr. Kingsley Oliveros MD  Family Medicine Resident

## 2017-11-14 NOTE — PROGRESS NOTES
Chief Complaint   Patient presents with    Routine Prenatal Visit     Body mass index is 32.12 kg/(m^2). 1. Have you been to the ER, urgent care clinic since your last visit? Hospitalized since your last visit? No     2. Have you seen or consulted any other health care providers outside of the [address] [place] [address] since your last visit? Include any pap smears or colon screening. No    Reviewed record in preparation for visit and have necessary documentation  Pt did not bring medication to office visit for review  Information was given to pt on Advanced Directives, Living Will  Information was given on Shingles Vaccine  Opportunity was given for questions  Goals that were addressed and/or need to be completed after this appointment include: There are no preventive care reminders to display for this patient.

## 2017-11-14 NOTE — MR AVS SNAPSHOT
Visit Information Date & Time Provider Department Dept. Phone Encounter #  
 11/14/2017  4:00 PM Margo Teran MD [address] [place] [address] [phone] [id number] Follow-up Instructions Return in about 4 weeks (around 12/12/2017) for routine ob . Upcoming Health Maintenance Date Due DTaP/Tdap/Td series (2 - Td) 12/28/2020 PAP AKA CERVICAL CYTOLOGY 1/7/2021 Allergies as of 11/14/2017  Review Complete On: 11/14/2017 By: Zenia Bonds LPN Severity Noted Reaction Type Reactions Ceclor [Cefaclor]  04/18/2012    Hives Penicillin G  04/18/2012    Hives Current Immunizations  Reviewed on 5/29/2013 Name Date Influenza Vaccine 10/3/2017 MMR Vaccine 3/25/2011 Tdap 12/28/2010 Not reviewed this visit You Were Diagnosed With   
  
 Codes Comments 10 weeks gestation of pregnancy    -  Primary ICD-10-CM: Z3A.10 ICD-9-CM: V22.2 Elderly multigravida in first trimester     ICD-10-CM: O09.521 ICD-9-CM: 758.50 Tobacco abuse     ICD-10-CM: Z72.0 ICD-9-CM: 305.1 Vitals BP Pulse Temp Resp Height(growth percentile) Weight(growth percentile) 114/72 (BP 1 Location: Left arm, BP Patient Position: Sitting) 82 98.7 °F (37.1 °C) (Oral) 16 5' 5\" (1.651 m) 193 lb (87.5 kg) LMP SpO2 BMI OB Status Smoking Status 09/01/2017 (Approximate) 100% 32.12 kg/m2 Pregnant Former Smoker Vitals History BMI and BSA Data Body Mass Index Body Surface Area  
 32.12 kg/m 2 2 m 2 Preferred Pharmacy Pharmacy Name Phone [facility] [address] [place] [address] [phone] Your Updated Medication List  
  
   
This list is accurate as of: 11/14/17  4:55 PM.  Always use your most recent med list.  
  
  
  
  
 cetirizine 10 mg tablet Commonly known as:  ZYRTEC Take 1 Tab by mouth daily. PRENATAL VITAMIN PO Take  by mouth. raNITIdine 75 mg tablet Commonly known as:  ZANTAC Take 1 Tab by mouth two (2) times a day. We Performed the Following AMB POC URINALYSIS DIP STICK AUTO W/O MICRO [83838 CPT(R)] CULTURE, URINE [id number] CPT(R)] Follow-up Instructions Return in about 4 weeks (around 12/12/2017) for routine ob . Introducing [facility]! [employer] introduces PO-MO patient portal. Now you can access parts of your medical record, email your doctor's office, and request medication refills online. 1. In your internet browser, go to [email or web address] 2. Click on the First Time User? Click Here link in the Sign In box. You will see the New Member Sign Up page. 3. Enter your PO-MO Access Code exactly as it appears below. You will not need to use this code after youve completed the sign-up process. If you do not sign up before the expiration date, you must request a new code. · PO-MO Access Code: [email or web address] Expires: 1/15/2018 12:10 PM 
 
4. Enter the last four digits of your Social Security Number (xxxx) and Date of Birth (mm/dd/yyyy) as indicated and click Submit. You will be taken to the next sign-up page. 5. Create a PO-MO ID. This will be your PO-MO login ID and cannot be changed, so think of one that is secure and easy to remember. 6. Create a PO-MO password. You can change your password at any time. 7. Enter your Password Reset Question and Answer. This can be used at a later time if you forget your password. 8. Enter your e-mail address. You will receive e-mail notification when new information is available in [address] 9. Click Sign Up. You can now view and download portions of your medical record. 10. Click the Download Summary menu link to download a portable copy of your medical information. If you have questions, please visit the Frequently Asked Questions section of the PO-MO website.  Remember, PO-MO is NOT to be used for urgent needs. For medical emergencies, dial 911. Now available from your iPhone and Android! Please provide this summary of care documentation to your next provider. Your primary care clinician is listed as Alex Go. If you have any questions after today's visit, please call [phone].

## 2017-11-17 LAB — BACTERIA UR CULT: NORMAL

## 2017-11-20 ENCOUNTER — TELEPHONE (OUTPATIENT)
Dept: PERINATAL CARE | Age: 37
End: 2017-11-20

## 2017-11-22 NOTE — PROGRESS NOTES
Pt is a 40 y.o.  at 10w4d by LMP c/w 1st trimester US for routine prenatal care. H/o ASCUS HPV negative on pap from 2016. H/o pre-eclampsia, will start 81mg ASA at 12 weeks. Will need to get sample for GC/Chlam at next visit.      Rh: Positive  GBS: N/A  Vaginal bleeding: NO  LOF: NO  Fetal movement: N/A  FHT's: 140's  Delivery plan:  sv RLTCS

## 2017-11-29 ENCOUNTER — HOSPITAL ENCOUNTER (OUTPATIENT)
Dept: PERINATAL CARE | Age: 37
Discharge: HOME | End: 2017-11-29
Attending: OBSTETRICS & GYNECOLOGY
Payer: COMMERCIAL

## 2017-11-29 PROCEDURE — 76801 OB US < 14 WKS SINGLE FETUS: CPT | Performed by: OBSTETRICS & GYNECOLOGY

## 2017-11-29 NOTE — PROGRESS NOTES
Dr. Brianne Schwartz recommendations: 1. Consider ONTD screening (MSAFP) during the second trimester, if the patient desires. 2. Recommend a detailed fetal anatomy scan and adnexal evaluation at 19-20 weeks.

## 2017-12-12 ENCOUNTER — ROUTINE PRENATAL (OUTPATIENT)
Dept: FAMILY MEDICINE CLINIC | Age: 37
End: 2017-12-12

## 2017-12-12 VITALS
OXYGEN SATURATION: 98 % | HEART RATE: 87 BPM | DIASTOLIC BLOOD PRESSURE: 74 MMHG | TEMPERATURE: 98.1 F | SYSTOLIC BLOOD PRESSURE: 121 MMHG

## 2017-12-12 DIAGNOSIS — O09.521 ELDERLY MULTIGRAVIDA IN FIRST TRIMESTER: Primary | ICD-10-CM

## 2017-12-12 DIAGNOSIS — K21.9 GASTROESOPHAGEAL REFLUX DISEASE WITHOUT ESOPHAGITIS: ICD-10-CM

## 2017-12-12 LAB
BILIRUB UR QL STRIP: NEGATIVE
GLUCOSE UR-MCNC: NEGATIVE MG/DL
KETONES P FAST UR STRIP-MCNC: NEGATIVE MG/DL
PH UR STRIP: 5.5 [PH] (ref 4.6–8)
PROT UR QL STRIP: NEGATIVE
SP GR UR STRIP: 1.03 (ref 1–1.03)
UA UROBILINOGEN AMB POC: NORMAL (ref 0.2–1)
URINALYSIS CLARITY POC: CLEAR
URINALYSIS COLOR POC: YELLOW
URINE BLOOD POC: NORMAL
URINE LEUKOCYTES POC: NORMAL
URINE NITRITES POC: NEGATIVE

## 2017-12-12 NOTE — PROGRESS NOTES
Return OB Visit       Subjective:   Kelli Ocasio 40 y.o. Annabel Lemus   CEDRIC: 6/8/2018, by Last Menstrual Period  GA:  14w4d. No complaints at this time. Diet: well balanced, healthy   Water intake: adequate   Prenatal Vitamins: taking     She is feeling her baby move. She denies vaginal bleeding, discharge or loss of fluid. She denies nausea, vomiting, severe abdominal pain or cramping. She denies dysuria. She denies headaches, dizziness or vision changes. She denies excessive swelling of extremities. Past Medical History - Reviewed today  Patient Active Problem List   Diagnosis Code    Tobacco abuse Z72.0    Gastroesophageal reflux disease without esophagitis K21.9    Elderly multigravida in first trimester O09.521    History of pre-eclampsia Z87.59         Medications - Reviewed today  Current Outpatient Prescriptions   Medication Sig Dispense Refill    PRENATAL VIT CALC,IRON,FOLIC (PRENATAL VITAMIN PO) Take  by mouth.  raNITIdine (ZANTAC) 75 mg tablet Take 1 Tab by mouth two (2) times a day. 30 Tab 3    cetirizine (ZYRTEC) 10 mg tablet Take 1 Tab by mouth daily. 30 Tab 1         Allergies - Reviewed today  Allergies   Allergen Reactions    Ceclor [Cefaclor] Hives    Penicillin G Hives         Family History - Reviewed today  Family History   Problem Relation Age of Onset    Breast Cancer Mother     Heart Disease Mother     Cancer Father          Social History - Reviewed today  Social History     Social History    Marital status:      Spouse name: N/A    Number of children: N/A    Years of education: N/A     Occupational History    Not on file.      Social History Main Topics    Smoking status: Former Smoker     Packs/day: 0.25     Years: 10.00     Types: Cigarettes     Quit date: 6/16/2015    Smokeless tobacco: Never Used    Alcohol use No    Drug use: No    Sexual activity: Yes     Partners: Male     Birth control/ protection: Condom     Other Topics Concern    Not on file     Social History Narrative         Health Maintenance - Reviewed today   Immunizations:     -Influenza: given on 10/3/2017     -Tdap: Will give during 3rd trimester       Screening:     -Pap smear: 2016: ASCUS , will repap in 3 years         Objective:     Visit Vitals    /74 (BP 1 Location: Right arm, BP Patient Position: Sitting)    Pulse 87    Temp 98.1 °F (36.7 °C) (Oral)    LMP 2017 (Approximate)    SpO2 98%       Physical Exam:  GENERAL APPEARANCE: alert, well appearing, in no apparent distress  LUNGS: clear to auscultation, no wheezes, rales or rhonchi, symmetric air entry  HEART: regular rate and rhythm, no murmurs  ABDOMEN: soft, nontender, nondistended, no abnormal masses, no epigastric pain, fundus not palpable and FHT present, 145 bpm  BACK: no CVA tenderness  UTERUS: gravid  EXTREMITIES: no redness or tenderness in the calves or thighs, no edema  NEUROLOGICAL: alert, oriented, normal speech, no focal findings or movement disorder noted  PELVIC: examination not indicated. Assessment   Pt is a 39 yo  with SIUP at  14w4d  C/w LMP and first trimester ultrasound. Prenatal Labs : O+, HIV nonreactive,RPR non reactive, HB sAg nonreactive, Rubella immune, VZV immune. ICD-10-CM ICD-9-CM    1. Elderly multigravida in first trimester O09.521 659.63 AMB POC URINALYSIS DIP STICK AUTO W/O MICRO      CULTURE, URINE   2. Gastroesophageal reflux disease without esophagitis K21.9 530.81          Plan     SIUP   - U/A showed trace leuks  and trace blood  - First trimester ultrasound:CRL consistent with LMP.  Good cardiac activity noted    - 2nd trimester screen for Down's Syndrome discussed: NT normal, CRL consistent with dates   - Expecting baby boy  - Follow up in 4 weeks     Hx of LTCS 2/2 fetal position: ( 10 years ago)   - Pt is unsure if she wants a  or repeat C/S     Hx of PreE:   Blood pressure wnl , UA: without proteinuria  - ASA, started at 12 weeks    GERD   Currently  Asymptomatic   - Pt was advised pt of lifestyle modifications , elevate the head of your bed, do not recline 30 mins after a meal.   - Ranitidine 75mg bid       AMA  - Advised pt to exercise daily, low fat, low salt,low sugar diet   - Second trimester :    Recommendations: Consider ONTD screening (MSAFP) during the second trimester, if the patient desires. Recommend a detailed fetal anatomy scan and adnexal evaluation at 19-20 weeks.          Hx of C/s due to malpresentation  ( LTCS)  - Pt unsure about , leaning towards repeat C/S         Hx of Tobacco Abuse:  2.5 ppy  Pt still smoking occasionally less than one cig a week. Counseled on the effects of smoking to include   including spontaneous pregnancy loss, placental abruption,  premature rupture of membranes, placenta previa,  labor and delivery, low birth weight.     - The patient was counseled on the dangers of tobacco use, and was advised to quit. Darnelle Drape strategies to maximize success, including removing cigarettes and smoking materials from environment, stress management and support of family/friends.          Hx of Obesity   Prepregnancy weight : 171.   - Discussed the patient's BMI with her.  The BMI follow up plan is as follows: I have counseled this patient on diet and exercise regimens.     Hx of Ectopic Pregnancy  And first trimester loss    Orders Placed This Encounter    CULTURE, URINE    AMB POC URINALYSIS DIP STICK AUTO W/O MICRO         Labor precautions discussed, including: Regular painful contractions, lasting for greater than one hour, taking your breath away; any vaginal bleeding; any leakage of fluid; or absent or decreased fetal movement. Call M.D. on call if any of these symptoms or signs occur. I have discussed the diagnosis with the patient and the intended plan as seen in the above orders.   The patient has received an after-visit summary and questions were answered co ncerning future plans.  I have discussed medication side effects and warnings with the patient as well.     Patient discussed  with Dr. Leslee Cardenas MD  Family Medicine Resident

## 2017-12-18 ENCOUNTER — TELEPHONE (OUTPATIENT)
Dept: FAMILY MEDICINE CLINIC | Age: 37
End: 2017-12-18

## 2017-12-18 DIAGNOSIS — J30.1 CHRONIC SEASONAL ALLERGIC RHINITIS DUE TO POLLEN: Primary | ICD-10-CM

## 2017-12-18 RX ORDER — CETIRIZINE HCL 10 MG
10 TABLET ORAL DAILY
Qty: 30 TAB | Refills: 1 | Status: SHIPPED | OUTPATIENT
Start: 2017-12-18 | End: 2018-01-31 | Stop reason: SDUPTHER

## 2017-12-20 LAB — BACTERIA UR CULT: NORMAL

## 2017-12-20 NOTE — PROGRESS NOTES
Pt is a 40 y.o.  at 14w4d by LMP c/w 1st trimester US for routine prenatal care. Of note, pap from 2016 was ASCUS but HPV negative, will re-pap in 2019    Rh: Positive  GBS: N/A  Vaginal bleeding: NO  LOF: NO  Fetal movement:  Thinks she felt first flutter   FHT's: 140's  Delivery plan: TOLAC vs RLTCS

## 2017-12-28 ENCOUNTER — ROUTINE PRENATAL (OUTPATIENT)
Dept: FAMILY MEDICINE CLINIC | Age: 37
End: 2017-12-28

## 2017-12-28 VITALS
HEART RATE: 88 BPM | OXYGEN SATURATION: 98 % | BODY MASS INDEX: 33.99 KG/M2 | SYSTOLIC BLOOD PRESSURE: 111 MMHG | DIASTOLIC BLOOD PRESSURE: 68 MMHG | HEIGHT: 65 IN | WEIGHT: 204 LBS | TEMPERATURE: 97.8 F | RESPIRATION RATE: 16 BRPM

## 2017-12-28 DIAGNOSIS — R35.0 URINARY FREQUENCY: Primary | ICD-10-CM

## 2017-12-28 NOTE — PROGRESS NOTES
[address] [place] [address] Residency Program,    Chantel Liz 303 with [facility] and [facility]     CC:\" I feel like I have an UTI\"    Mulugeta Mack is a 40 y.o. [id number] at 12 w 4 d by LMP and first trimester ultrasound who presents for concerns of urinary frequency . Urinary Frequency   Pt reports she has been urinating more frequently. She denies vaginal bleeding  urgency,suprapubic pain, urine with  foul odor, abnormal vaginal discharge, back pain or dysuria . Pt reports she has no pain but just wanted to double check. PMHx:  History reviewed. No pertinent past medical history. Meds:   Current Outpatient Prescriptions   Medication Sig Dispense Refill    cetirizine (ZYRTEC) 10 mg tablet Take 1 Tab by mouth daily. 30 Tab 1    PRENATAL VIT CALC,IRON,FOLIC (PRENATAL VITAMIN PO) Take  by mouth.  raNITIdine (ZANTAC) 75 mg tablet Take 1 Tab by mouth two (2) times a day. 30 Tab 3       Allergies: Allergies   Allergen Reactions    Ceclor [Cefaclor] Hives    Penicillin G Hives       Smoker:  History   Smoking Status    Former Smoker    Packs/day: 0.25    Years: 10.00    Types: Cigarettes    Quit date: 6/16/2015   Smokeless Tobacco    Never Used       ETOH:   History   Alcohol Use No       FH:   Family History   Problem Relation Age of Onset    Breast Cancer Mother     Heart Disease Mother     Cancer Father        ROS:  Review of Systems   Constitutional: Negative for activity change, appetite change, chills, fatigue and fever. Respiratory: Negative for chest tightness and shortness of breath. Cardiovascular: Negative for chest pain, palpitations and leg swelling. Gastrointestinal: Negative for abdominal pain, diarrhea, nausea and vomiting. Genitourinary: Positive for frequency. Negative for dysuria, flank pain, hematuria and urgency. Musculoskeletal: Negative for back pain and joint swelling.    Neurological: Negative for dizziness, weakness and numbness. Psychiatric/Behavioral: Negative for confusion. Physical Exam:  Visit Vitals    /68 (BP 1 Location: Right arm, BP Patient Position: Sitting)    Pulse 88    Temp 97.8 °F (36.6 °C) (Oral)    Resp 16    Ht 5' 5\" (1.651 m)    Wt 204 lb (92.5 kg)    LMP 09/01/2017 (Approximate)    SpO2 98%    BMI 33.95 kg/m2       Wt Readings from Last 3 Encounters:   12/28/17 204 lb (92.5 kg)   11/14/17 193 lb (87.5 kg)   10/17/17 188 lb (85.3 kg)     BP Readings from Last 3 Encounters:   12/28/17 111/68   12/12/17 121/74   11/14/17 114/72        Physical Exam   Constitutional: She is oriented to person, place, and time. She appears well-developed and well-nourished. HENT:   Head: Atraumatic. Eyes: Conjunctivae and EOM are normal.   Neck: Neck supple. Cardiovascular: Normal rate, regular rhythm, normal heart sounds and intact distal pulses. Pulmonary/Chest: Effort normal and breath sounds normal. No respiratory distress. Abdominal: Soft. Bowel sounds are normal. She exhibits no distension. There is no tenderness. Gravid : FHT 150bpm,    Musculoskeletal: Normal range of motion. She exhibits no edema. Neurological: She is alert and oriented to person, place, and time. No cranial nerve deficit. Skin: Skin is warm. No erythema. Psychiatric: She has a normal mood and affect. Assessment     40 y.o. female presents with history of:  Patient Active Problem List   Diagnosis Code    Tobacco abuse Z72.0    Gastroesophageal reflux disease without esophagitis K21.9    Elderly multigravida in first trimester O09.521    History of pre-eclampsia Z87.59       Today's diagnoses are:    ICD-10-CM ICD-9-CM    1. Urinary frequency R35.0 788.41 CULTURE, URINE      CANCELED: AMB POC URINALYSIS DIP STICK AUTO W/O MICRO              Plan     ·  Urinary Frequency    - UA : trace leukocytes esterase, negative: LE, protein, glucose.     - Will send urine for urine culture    - Encourage hydration with water avoid sodas and high acidic foods and drinks. Patient Care Team:  Prince Agarwal MD as PCP - General (Family Practice)  Ale Rogers MD as Physician (Obstetrics & Gynecology)    Follow-up Disposition:  Return in about 12 days (around 1/9/2018) for routine prenatal visit . Prior labs and imaging were reviewed. I have discussed the diagnosis with the patient and the intended plan as seen in the above orders. The patient has received an after-visit summary and questions were answered concerning future plans. I have discussed medication side effects and warnings with the patient as well. Patient discusessed with Dr. Francia Bautista, Attending Physician.     Prince Agarwal MD    [address] [place] [address]

## 2017-12-28 NOTE — PROGRESS NOTES
1. Have you been to the ER, urgent care clinic since your last visit? Hospitalized since your last visit? No    2. Have you seen or consulted any other health care providers outside of the [address] [place] [address] since your last visit? Include any pap smears or colon screening. No  Reviewed record in preparation for visit and have necessary documentation  Pt did not bring medication to office visit for review    Goals that were addressed and/or need to be completed during or after this appointment include   There are no preventive care reminders to display for this patient.

## 2017-12-30 LAB — BACTERIA UR CULT: NORMAL

## 2018-01-09 ENCOUNTER — ROUTINE PRENATAL (OUTPATIENT)
Dept: FAMILY MEDICINE CLINIC | Age: 38
End: 2018-01-09

## 2018-01-09 VITALS
WEIGHT: 210 LBS | TEMPERATURE: 97.8 F | RESPIRATION RATE: 20 BRPM | HEART RATE: 86 BPM | OXYGEN SATURATION: 97 % | HEIGHT: 65 IN | SYSTOLIC BLOOD PRESSURE: 122 MMHG | DIASTOLIC BLOOD PRESSURE: 67 MMHG | BODY MASS INDEX: 34.99 KG/M2

## 2018-01-09 DIAGNOSIS — O09.521 ELDERLY MULTIGRAVIDA IN FIRST TRIMESTER: Primary | ICD-10-CM

## 2018-01-09 LAB
BILIRUB UR QL STRIP: NEGATIVE
GLUCOSE UR-MCNC: NEGATIVE MG/DL
KETONES P FAST UR STRIP-MCNC: NEGATIVE MG/DL
PH UR STRIP: 6.5 [PH] (ref 4.6–8)
PROT UR QL STRIP: NEGATIVE
SP GR UR STRIP: 1.01 (ref 1–1.03)
UA UROBILINOGEN AMB POC: NORMAL (ref 0.2–1)
URINALYSIS CLARITY POC: CLEAR
URINALYSIS COLOR POC: YELLOW
URINE BLOOD POC: NORMAL
URINE LEUKOCYTES POC: NORMAL
URINE NITRITES POC: NEGATIVE

## 2018-01-09 NOTE — PROGRESS NOTES
I discussed the findings, assessment and plan in detail with the resident and agree with the resident's findings and plan as documented in the resident's note. Tacho Carmona M.D.

## 2018-01-09 NOTE — PROGRESS NOTES
Return OB Visit       Subjective:   Jacey Torres 40 y.o. Oleg Carrizales  CEDRIC: 6/8/2018, by Last Menstrual Period  GA:  18w4d. States she does have any antepartum symptoms . No complaints at this time. Diet: well balanced, healthy   Water intake: adequate   Prenatal Vitamins: taking     She is feeling her baby move. She denies vaginal bleeding, discharge or loss of fluid. She denies nausea, vomiting, severe abdominal pain or cramping. She denies dysuria. She denies headaches, dizziness or vision changes. She denies excessive swelling of extremities. Past Medical History - Reviewed today  Patient Active Problem List   Diagnosis Code    Tobacco abuse Z72.0    Gastroesophageal reflux disease without esophagitis K21.9    Elderly multigravida in first trimester O09.521    History of pre-eclampsia Z87.59         Medications - Reviewed today  Current Outpatient Prescriptions   Medication Sig Dispense Refill    cetirizine (ZYRTEC) 10 mg tablet Take 1 Tab by mouth daily. 30 Tab 1    PRENATAL VIT CALC,IRON,FOLIC (PRENATAL VITAMIN PO) Take  by mouth.  raNITIdine (ZANTAC) 75 mg tablet Take 1 Tab by mouth two (2) times a day. 30 Tab 3         Allergies - Reviewed today  Allergies   Allergen Reactions    Ceclor [Cefaclor] Hives    Penicillin G Hives         Family History - Reviewed today  Family History   Problem Relation Age of Onset    Breast Cancer Mother     Heart Disease Mother     Cancer Father          Social History - Reviewed today  Social History     Social History    Marital status:      Spouse name: N/A    Number of children: N/A    Years of education: N/A     Occupational History    Not on file.      Social History Main Topics    Smoking status: Former Smoker     Packs/day: 0.25     Years: 10.00     Types: Cigarettes     Quit date: 6/16/2015    Smokeless tobacco: Never Used    Alcohol use No    Drug use: No    Sexual activity: Yes     Partners: Male     Birth control/ protection: Condom     Other Topics Concern    Not on file     Social History Narrative         Health Maintenance - Reviewed today   Immunizations:     -Influenza: given on 10/3/2017     -Tdap: Will give during 3rd trimester        Screening:     -Pap smear: 2016: ASCUS , will repap in 3 years     Objective:     Visit Vitals    /67    Pulse 86    Temp 97.8 °F (36.6 °C) (Oral)    Resp 20    Ht 5' 5\" (1.651 m)    Wt 210 lb (95.3 kg)    LMP 2017 (Approximate)    SpO2 97%    BMI 34.95 kg/m2       Physical Exam:  GENERAL APPEARANCE: alert, well appearing, in no apparent distress  LUNGS: clear to auscultation, no wheezes, rales or rhonchi, symmetric air entry  HEART: regular rate and rhythm, no murmurs  ABDOMEN: soft, nontender, nondistended, no abnormal masses, no epigastric pain, fundus not palpable, FHT present and skin scar: transverse, 145 bpm  BACK: no CVA tenderness  UTERUS: gravid  EXTREMITIES: no redness or tenderness in the calves or thighs, no edema  NEUROLOGICAL: alert, oriented, normal speech, no focal findings or movement disorder noted  PELVIC: examination not indicated. Assessment   Pt is a 39 yo  with SIUP at  18w4d  C/w LMP and first trimester ultrasound.      Prenatal Labs : O+, HIV nonreactive,RPR non reactive, HB sAg nonreactive, Rubella immune, VZV immune. ICD-10-CM ICD-9-CM    1. Elderly multigravida in first trimester O09.521 659.63 AMB POC URINALYSIS DIP STICK AUTO W/O MICRO         Plan   SIUP   - U/A showed trace leuks  and trace blood  - First trimester ultrasound:CRL consistent with LMP.  Good cardiac activity noted    - 2nd trimester screen for Down's Syndrome discussed: NT normal, CRL consistent with dates   - Will have a recommend fetal anatomy scan at 19-20 weeks   - Expecting baby boy  - Desires circumcision  - Follow up in 4 weeks      Hx of LTCS 2/2 fetal position: ( 10 years ago)   - Pt is unsure if she wants a  or repeat C/S      Hx of PreE:   Blood pressure wnl , UA: without proteinuria  - ASA, started at 12 weeks       GERD   - Pt was advised pt of lifestyle modifications , elevate the head of your bed, do not recline 30 mins after a meal.   - Ranitidine 75mg bid       AMA  - Advised pt to exercise daily, low fat, low salt,low sugar diet   - Second trimester :    Recommendations: Consider ONTD screening (MSAFP) during the second trimester, if the patient desires. Recommend a detailed fetal anatomy scan and adnexal evaluation at 19-20 weeks.            Hx of C/s due to malpresentation  ( LTCS)  - Pt unsure about , leaning towards repeat C/S          Hx of Tobacco Abuse:  2.5 ppy  Pt still smoking occasionally now 2-3 cigs a week. Counseled on the effects of smoking to include   including spontaneous pregnancy loss, placental abruption,  premature rupture of membranes, placenta previa,  labor and delivery, low birth weight.      - The patient was counseled on the dangers of tobacco use, and was advised to quit. Kelli Schwab strategies to maximize success, including removing cigarettes and smoking materials from environment, stress management and support of family/friends.          Hx of Obesity   Prepregnancy weight : 171.   - Discussed the patient's BMI with her.  The BMI follow up plan is as follows: I have counseled this patient on diet and exercise regimens.      Hx of ectopic pregnancy and first trimester loss    Orders Placed This Encounter    AMB POC URINALYSIS DIP STICK AUTO W/O MICRO         Labor precautions discussed, including: Regular painful contractions, lasting for greater than one hour, taking your breath away; any vaginal bleeding; any leakage of fluid; or absent or decreased fetal movement. Call M.D. on call if any of these symptoms or signs occur. I have discussed the diagnosis with the patient and the intended plan as seen in the above orders.   The patient has received an after-visit summary and questions were answered concerning future plans. I have discussed medication side effects and warnings with the patient as well.     Patient discussed  with Dr. Etienne Bland MD  Family Medicine Resident

## 2018-01-10 ENCOUNTER — TELEPHONE (OUTPATIENT)
Dept: FAMILY MEDICINE CLINIC | Age: 38
End: 2018-01-10

## 2018-01-10 NOTE — TELEPHONE ENCOUNTER
Pt requested and assisted with signing up for [facility].  Pt requesting info on upcoming appointment  Pt advised to call  center for info  She verbalized understanding

## 2018-01-17 NOTE — PROGRESS NOTES
Pt is a 40 y.o.  at 18w4d by LMP c/w 1st trimester US for routine prenatal care.     Rh: Positive, Negative  GBS: N/A  Vaginal bleeding: NO  LOF: NO  Fetal movement: Normal  FHT's: 140's  Delivery plan: TOLAC vs RLTCS

## 2018-01-24 ENCOUNTER — HOSPITAL ENCOUNTER (OUTPATIENT)
Dept: PERINATAL CARE | Age: 38
Discharge: HOME | End: 2018-01-24
Attending: OBSTETRICS & GYNECOLOGY
Payer: COMMERCIAL

## 2018-01-24 PROCEDURE — 76811 OB US DETAILED SNGL FETUS: CPT | Performed by: OBSTETRICS & GYNECOLOGY

## 2018-01-31 DIAGNOSIS — J30.1 CHRONIC SEASONAL ALLERGIC RHINITIS DUE TO POLLEN: ICD-10-CM

## 2018-01-31 RX ORDER — CETIRIZINE HCL 10 MG
10 TABLET ORAL DAILY
Qty: 30 TAB | Refills: 1 | Status: SHIPPED | OUTPATIENT
Start: 2018-01-31 | End: 2018-02-15 | Stop reason: SDUPTHER

## 2018-02-06 ENCOUNTER — ROUTINE PRENATAL (OUTPATIENT)
Dept: FAMILY MEDICINE CLINIC | Age: 38
End: 2018-02-06

## 2018-02-06 VITALS
DIASTOLIC BLOOD PRESSURE: 68 MMHG | WEIGHT: 213 LBS | HEART RATE: 89 BPM | BODY MASS INDEX: 35.49 KG/M2 | HEIGHT: 65 IN | SYSTOLIC BLOOD PRESSURE: 120 MMHG | OXYGEN SATURATION: 97 % | TEMPERATURE: 97.3 F

## 2018-02-06 DIAGNOSIS — O09.522 ENCOUNTER FOR SUPERVISION OF ELDERLY MULTIGRAVIDA IN SECOND TRIMESTER, ANTEPARTUM: Primary | ICD-10-CM

## 2018-02-06 LAB
BILIRUB UR QL STRIP: NEGATIVE
GLUCOSE UR-MCNC: NEGATIVE MG/DL
KETONES P FAST UR STRIP-MCNC: NEGATIVE MG/DL
PH UR STRIP: 7 [PH] (ref 4.6–8)
PROT UR QL STRIP: NEGATIVE
SP GR UR STRIP: 1.02 (ref 1–1.03)
UA UROBILINOGEN AMB POC: NORMAL (ref 0.2–1)
URINALYSIS CLARITY POC: CLEAR
URINALYSIS COLOR POC: YELLOW
URINE BLOOD POC: NEGATIVE
URINE LEUKOCYTES POC: NORMAL
URINE NITRITES POC: NEGATIVE

## 2018-02-06 NOTE — PROGRESS NOTES
Chief Complaint   Patient presents with    Routine Prenatal Visit   1. Have you been to the ER, urgent care clinic since your last visit? Hospitalized since your last visit? No    2. Have you seen or consulted any other health care providers outside of the [address] [place] [address] since your last visit? Include any pap smears or colon screening.  No

## 2018-02-06 NOTE — PROGRESS NOTES
Return OB Visit       Subjective:   Constantino Smith 40 y.o. Andrés Sultana   CEDRIC: 6/8/2018, by Last Menstrual Period  GA:  22w4d. No complaints at this time. Headaches have resolved, reflux has resolved. Pt continues to smoke 1 cig per day. Diet: well balanced, healthy   Water intake: adequate   Prenatal Vitamins: taking     She is feeling her baby move. She denies vaginal bleeding, discharge or loss of fluid. She denies nausea, vomiting, severe abdominal pain or cramping. She denies dysuria. She denies headaches, dizziness or vision changes. She denies excessive swelling of extremities. Past Medical History - Reviewed today  Patient Active Problem List   Diagnosis Code    Tobacco abuse Z72.0    Gastroesophageal reflux disease without esophagitis K21.9    Elderly multigravida in first trimester O09.521    History of pre-eclampsia Z87.59         Medications - Reviewed today  Current Outpatient Prescriptions   Medication Sig Dispense Refill    cetirizine (ZYRTEC) 10 mg tablet Take 1 Tab by mouth daily. 30 Tab 1    PRENATAL VIT CALC,IRON,FOLIC (PRENATAL VITAMIN PO) Take  by mouth.  raNITIdine (ZANTAC) 75 mg tablet Take 1 Tab by mouth two (2) times a day. 30 Tab 3         Allergies - Reviewed today  Allergies   Allergen Reactions    Ceclor [Cefaclor] Hives    Penicillin G Hives         Family History - Reviewed today  Family History   Problem Relation Age of Onset    Breast Cancer Mother     Heart Disease Mother     Cancer Father          Social History - Reviewed today  Social History     Social History    Marital status:      Spouse name: N/A    Number of children: N/A    Years of education: N/A     Occupational History    Not on file.      Social History Main Topics    Smoking status: Former Smoker     Packs/day: 0.25     Years: 10.00     Types: Cigarettes     Quit date: 6/16/2015    Smokeless tobacco: Never Used    Alcohol use No    Drug use: No    Sexual activity: Yes     Partners: Male     Birth control/ protection: Condom     Other Topics Concern    Not on file     Social History Narrative         Health Maintenance - Reviewed today     Immunizations:     -Influenza: given on 10/3/2017     -Tdap: Will give during 3rd trimester        Screening:     -Pap smear: 2016: ASCUS , will repap in 3 years     Objective:     Visit Vitals    /68 (BP 1 Location: Right arm, BP Patient Position: Sitting)    Pulse 89    Temp 97.3 °F (36.3 °C) (Oral)    Ht 5' 5\" (1.651 m)    Wt 213 lb (96.6 kg)    LMP 2017 (Approximate)    SpO2 97%    BMI 35.45 kg/m2       Physical Exam:  GENERAL APPEARANCE: alert, well appearing, in no apparent distress  LUNGS: clear to auscultation, no wheezes, rales or rhonchi, symmetric air entry  HEART: regular rate and rhythm, no murmurs  ABDOMEN: soft, nontender, nondistended, no abnormal masses, no epigastric pain and FHT present, 139 bpm  BACK: no CVA tenderness  UTERUS: gravid,  EXTREMITIES: no redness or tenderness in the calves or thighs, no edema  NEUROLOGICAL: alert, oriented, normal speech, no focal findings or movement disorder noted  PELVIC: examination not indicated. Assessment     Pt is a 39 yo  with SIUP at  22w4d  C/w LMP and first trimester ultrasound.      Prenatal Labs : O+, HIV nonreactive,RPR non reactive, HB sAg nonreactive, Rubella immune, VZV immune. ICD-10-CM ICD-9-CM    1. Encounter for supervision of elderly multigravida in second trimester, antepartum O09.522 V23.82 AMB POC URINALYSIS DIP STICK AUTO W/O MICRO      CULTURE, URINE         Plan   SIUP   - U/A showed  leuks 1+, otherwise negative   - Urine culture sent  - First trimester ultrasound:CRL consistent with LMP.  Good cardiac activity noted    - 2nd trimester screen for Down's Syndrome discussed: NT normal, CRL consistent with dates   - Expecting baby boy  - Anticipate c/s  - Follow up in 4 weeks      Hx of LTCS 2/2  failed induction at 45 weeks: ( 10 years ago)   - Pt wants a repeat C/S      Hx of PreE:   Blood pressure wnl , UA: without proteinuria  - ASA, started at 12 weeks       GERD   Currently  Asymptomatic,symptoms have resolved with current regimen   - Pt was advised pt of lifestyle modifications , elevate the head of your bed, do not recline 30 mins after a meal.   - Ranitidine 75mg bid       AMA  Second trimester ultrasound : normal fetal and maternal anatomy,normal fetal movements, normal fluid. Negative/low-risk fetal cell-free DNA screening for fetal Trisomy 21/18/13.   - Advised pt to exercise daily, low fat, low salt,low sugar diet   - MFM : recommended follow up as clinically indicated.               Hx of Tobacco Abuse:  2.5 ppy, now 1 cig a day vs 1 week    -Counseled on the effects of smoking to include   including spontaneous pregnancy loss, placental abruption,  premature rupture of membranes, placenta previa,  labor and delivery, low birth weight.      - The patient was counseled on the dangers of tobacco use, and was advised to quit. Mamie Pinto strategies to maximize success, including removing cigarettes and smoking materials from environment, stress management and support of family/friends.          Hx of Obesity   Pre-pregnancy weight : 171.    Last 3 Recorded Weights in this Encounter    18 1445   Weight: 213 lb (96.6 kg)     - Discussed the patient's BMI with her.  The BMI follow up plan is as follows: I have counseled this patient on diet and exercise regimens.      Hx of Ectopic Pregnancy and first trimester loss   - Second trimester ultrasound showing normal fetal and maternal anatomy         Orders Placed This Encounter    CULTURE, URINE    AMB POC URINALYSIS DIP STICK AUTO W/O MICRO     ·     Orders Placed This Encounter    CULTURE, URINE    AMB POC URINALYSIS DIP STICK AUTO W/O MICRO         Labor precautions discussed, including: Regular painful contractions, lasting for greater than one hour, taking your breath away; any vaginal bleeding; any leakage of fluid; or absent or decreased fetal movement. Call M.D. on call if any of these symptoms or signs occur. I have discussed the diagnosis with the patient and the intended plan as seen in the above orders. The patient has received an after-visit summary and questions were answered concerning future plans. I have discussed medication side effects and warnings with the patient as well.     Patient discussed  with Dr. Minesh Shea MD  Family Medicine Resident

## 2018-02-08 LAB — BACTERIA UR CULT: NORMAL

## 2018-02-14 NOTE — PROGRESS NOTES
Pt is a 40 y.o.  at 22w4d by LMP c/w 1st trimester US for routine prenatal care. Rh: Positive  GBS: N/A  Vaginal bleeding: NO  LOF: NO  Fetal movement: Normal  FHT's: 130's  Delivery plan: RLTCS per pt preference. Will continue to offer TOLAC as an option.

## 2018-02-15 DIAGNOSIS — J30.1 CHRONIC SEASONAL ALLERGIC RHINITIS DUE TO POLLEN: ICD-10-CM

## 2018-02-15 RX ORDER — CETIRIZINE HCL 10 MG
10 TABLET ORAL DAILY
Qty: 90 TAB | Refills: 1 | Status: SHIPPED | OUTPATIENT
Start: 2018-02-15 | End: 2018-08-31 | Stop reason: SDUPTHER

## 2018-03-05 ENCOUNTER — TELEPHONE (OUTPATIENT)
Dept: FAMILY MEDICINE CLINIC | Age: 38
End: 2018-03-05

## 2018-03-05 DIAGNOSIS — J02.9 PHARYNGITIS, UNSPECIFIED ETIOLOGY: ICD-10-CM

## 2018-03-05 DIAGNOSIS — J01.00 ACUTE MAXILLARY SINUSITIS, RECURRENCE NOT SPECIFIED: Primary | ICD-10-CM

## 2018-03-05 RX ORDER — AZITHROMYCIN 250 MG/1
TABLET, FILM COATED ORAL
Qty: 6 TAB | Refills: 0 | Status: SHIPPED | OUTPATIENT
Start: 2018-03-05 | End: 2018-03-10

## 2018-03-05 NOTE — TELEPHONE ENCOUNTER
Patient with sinus pain and pressure and sore throat (exudate on uvula). Prescription sent to [facility]. Allergies reviewed. Patient aware and in agreement with plan.

## 2018-03-06 ENCOUNTER — TELEPHONE (OUTPATIENT)
Dept: FAMILY MEDICINE CLINIC | Age: 38
End: 2018-03-06

## 2018-03-06 DIAGNOSIS — Z34.82 ENCOUNTER FOR SUPERVISION OF OTHER NORMAL PREGNANCY IN SECOND TRIMESTER: Primary | ICD-10-CM

## 2018-03-06 DIAGNOSIS — Z34.82 ENCOUNTER FOR SUPERVISION OF OTHER NORMAL PREGNANCY IN SECOND TRIMESTER: ICD-10-CM

## 2018-03-08 ENCOUNTER — ROUTINE PRENATAL (OUTPATIENT)
Dept: FAMILY MEDICINE CLINIC | Age: 38
End: 2018-03-08

## 2018-03-08 VITALS
BODY MASS INDEX: 36.82 KG/M2 | RESPIRATION RATE: 16 BRPM | HEIGHT: 65 IN | TEMPERATURE: 98 F | OXYGEN SATURATION: 97 % | WEIGHT: 221 LBS | SYSTOLIC BLOOD PRESSURE: 134 MMHG | DIASTOLIC BLOOD PRESSURE: 77 MMHG | HEART RATE: 92 BPM

## 2018-03-08 DIAGNOSIS — Z87.59 HISTORY OF PRE-ECLAMPSIA: ICD-10-CM

## 2018-03-08 DIAGNOSIS — Z72.0 TOBACCO ABUSE: ICD-10-CM

## 2018-03-08 DIAGNOSIS — K21.9 GASTROESOPHAGEAL REFLUX DISEASE WITHOUT ESOPHAGITIS: ICD-10-CM

## 2018-03-08 DIAGNOSIS — O09.522 ENCOUNTER FOR SUPERVISION OF ELDERLY MULTIGRAVIDA IN SECOND TRIMESTER, ANTEPARTUM: Primary | ICD-10-CM

## 2018-03-08 LAB
GTT, 1 HR, GLUCOLA, EXTERNAL: NORMAL
HCT, EXTERNAL: 33.7
HCT, EXTERNAL: 33.7
HGB, EXTERNAL: 11.1
HGB, EXTERNAL: 11.1
PLATELET CNT,   EXTERNAL: 301

## 2018-03-08 NOTE — MR AVS SNAPSHOT
[address] [address] 
[phone] Patient: Grace Tracy MRN: [id number] YMI:[id number] Visit Information Date & Time Provider Department Dept. Phone Encounter #  
 3/8/2018  3:50 PM Perico Chapa MD [address] [place] [address] [phone] [id number] Follow-up Instructions Return in about 3 weeks (around 3/30/2018) for at [facility] at 2:00pm , routine prenatal visit . 3/30/2018  2:00 PM  
OB VISIT with Perico Chapa MD  
[address] [place] [address]) Appt Note: OB PATIENT of Dr. Dallas Zepeda at [facility] the [facility] per her phone call,   
 [address] [facility]. [address] [place] [phone]  
  
   
 [address] [place] [address] [phone] Upcoming Health Maintenance Date Due DTaP/Tdap/Td series (2 - Td) 12/28/2020 PAP AKA CERVICAL CYTOLOGY 1/7/2021 Allergies as of 3/8/2018  Review Complete On: 2/6/2018 By: Dora Bishop LPN Severity Noted Reaction Type Reactions Ceclor [Cefaclor]  04/18/2012    Hives Penicillin G  04/18/2012    Hives Current Immunizations  Reviewed on 5/29/2013 Name Date Influenza Vaccine 10/3/2017 MMR Vaccine 3/25/2011 Tdap 3/8/2018, 12/28/2010 Not reviewed this visit You Were Diagnosed With   
  
 Codes Comments Encounter for supervision of elderly multigravida in second trimester, antepartum    -  Primary ICD-10-CM: O09.522 ICD-9-CM: V23.82 History of pre-eclampsia     ICD-10-CM: Z87.59 
ICD-9-CM: V13.29 Tobacco abuse     ICD-10-CM: Z72.0 ICD-9-CM: 305.1 Gastroesophageal reflux disease without esophagitis     ICD-10-CM: K21.9 ICD-9-CM: 530.81 Vitals BP Pulse Temp Resp Height(growth percentile) Weight(growth percentile) 134/77 (BP 1 Location: Left arm, BP Patient Position: Sitting) 92 98 °F (36.7 °C) (Oral) 16 5' 5\" (1.651 m) 221 lb (100.2 kg) LMP SpO2 BMI OB Status Smoking Status 2017 (Approximate) 97% 36.78 kg/m2 Pregnant Former Smoker Vitals History BMI and BSA Data Body Mass Index Body Surface Area 36.78 kg/m 2 2.14 m 2 Preferred Pharmacy Pharmacy Name Phone [address] [place], [place] - [address] [phone] Your Updated Medication List  
  
   
This list is accurate as of 3/8/18  4:08 PM.  Always use your most recent med list.  
  
  
  
  
 azithromycin 250 mg tablet Commonly known as:  Darleen Buffalo Take 2 tablets today, then take 1 tablet daily  
  
 cetirizine 10 mg tablet Commonly known as:  ZYRTEC Take 1 Tab by mouth daily. PRENATAL VITAMIN PO Take  by mouth. raNITIdine 75 mg tablet Commonly known as:  ZANTAC Take 1 Tab by mouth two (2) times a day. We Performed the Following AMB POC URINALYSIS DIP STICK AUTO W/O MICRO [35736 CPT(R)] CBC W/O DIFF [73169 CPT(R)] CULTURE, URINE [id number] CPT(R)]   
 LD [85178 CPT(R)] METABOLIC PANEL, COMPREHENSIVE [94100 CPT(R)] PROT+CREATU (RANDOM) [id number] CPT(R)] URIC ACID [id number] CPT(R)] Follow-up Instructions Return in about 3 weeks (around 3/30/2018) for at [facility] at 2:00pm , routine prenatal visit . Patient Instructions Learning About Pregnancy and Smoking How does smoking affect your pregnancy? When you're pregnant, everything you put in your body can affect your baby. If you smoke, your baby is exposed to chemicals such as nicotine and carbon monoxide. Secondhand smoke also is a problem. Babies whose mothers breathe other people's tobacco smoke during pregnancy are more likely to have health problems. Smoking during pregnancy increases the chance of: · Placenta problems. (The placenta is an organ that gives the baby oxygen and nutrients from the mother and gets rid of waste.) ·  birth. The baby is born too soon. · Miscarriage or stillbirth. · Birth defects, such as a cleft lip. · Death early in life, mainly because of sudden infant death syndrome (SIDS). · A baby with a low birth weight. Babies with low weight at birth can have more health problems than those born at normal weight. Some of the problems can be serious. A baby with a low birth weight may have a greater chance for problems in adulthood, such as high blood pressure, diabetes, and heart disease. How does your being smoke-free help your baby? When you're smoke-free, you give your baby the oxygen he or she needs for a healthy start. And you protect your baby from the chemicals in tobacco smoke. If you quit smoking before you become pregnant, your risk of having a baby with a low birth weight is the same as that of a woman who does not smoke. The same is true if you quit during the first 3 months of your pregnancy. It's never too late to quit smoking. Women who quit later in pregnancy still reduce the risk of problems for their babies. It's also important to stay smoke-free after your baby is born. Ask others to not smoke around your baby. Protecting your baby from tobacco smoke will reduce your baby's chance of having breathing problems. How do you quit? When you're ready to go smoke-free, use these tips to help you quit: 
Get ready · If you're not pregnant, choose a quit date that works for you. If you're pregnant, it's best if you can stop smoking right away. If you're pregnant and can't stop yet, try to cut down as much as you can. · Talk to your doctor about a program to help you quit. Ask your doctor about nicotine replacement or other medicine. · Get rid of your cigarettes, ashtrays, and lighters. Clean your house and clothes to get rid of the smoke smell. · If you live with someone who smokes, discuss quitting together. If this is not an option, talk to that person about not smoking around you. Make a plan for quitting · Find ways to avoid places where others are smoking. · Think about when it might be hardest to not smoke, such as when you are restless or around others who smoke. Plan how you will handle your cravings during these times. · Change your routine. Avoid those things that make you reach for a cigarette. · Plan ways to cope with withdrawal from smoking. For example, take a walk after dinner instead of having a cigarette. Think of ways to cut down on stress in the first few weeks of quitting. · Keep trying to quit if you start smoking again. Most people who smoke will quit and restart many times before they stop for good. Each time you start smoking again, think about why you went back to smoking and why it's important to quit. · Remind yourself often about why you want to quit, and that it will get easier with time. Get support · Ask loved ones or people who used to smoke for support and tips. · Get counseling. People who use telephone, group, or one-on-one counseling are much more likely to stop smoking. · Join a support group for people who smoke. · Call 800-QUIT-NOW for information and support. Follow-up care is a key part of your treatment and safety. Be sure to make and go to all appointments, and call your doctor if you are having problems. It's also a good idea to know your test results and keep a list of the medicines you take. Where can you learn more? Go to http://mary kay-yung.info/. Enter U625 in the search box to learn more about \"Learning About Pregnancy and Smoking. \" Current as of: March 16, 2017 Content Version: 11.4 © 4730-3669 Healthwise, Incorporated. Care instructions adapted under license by [employer] (which disclaims liability or warranty for this information).  If you have questions about a medical condition or this instruction, always ask your healthcare professional. [employer]car[employer] disclaims any warranty or liability for your use of this information. Weeks 26 to 30 of Your Pregnancy: Care Instructions Your Care Instructions You are now in your last trimester of pregnancy. Your baby is growing rapidly. And you'll probably feel your baby moving around more often. Your doctor may ask you to count your baby's kicks. Your back may ache as your body gets used to your baby's size and length. If you haven't already had the Tdap shot during this pregnancy, talk to your doctor about getting it. It will help protect your  against pertussis infection. During this time, it's important to take care of yourself and pay attention to what your body needs. If you feel sexual, explore ways to be close with your partner that match your comfort and desire. Use the tips provided in this care sheet to find ways to be sexual in your own way. Follow-up care is a key part of your treatment and safety. Be sure to make and go to all appointments, and call your doctor if you are having problems. It's also a good idea to know your test results and keep a list of the medicines you take. How can you care for yourself at home? Take it easy at work · Take frequent breaks. If possible, stop working when you are tired, and rest during your lunch hour. · Take bathroom breaks every 2 hours. · Change positions often. If you sit for long periods, stand up and walk around. · When you stand for a long time, keep one foot on a low stool with your knee bent. After standing a lot, sit with your feet up. · Avoid fumes, chemicals, and tobacco smoke. Be sexual in your own way · Having sex during pregnancy is okay, unless your doctor tells you not to. · You may be very interested in sex, or you may have no interest at all. · Your growing belly can make it hard to find a good position during intercourse. [place] and explore.  
· You may get cramps in your uterus when your partner touches your breasts. · A back rub may relieve the backache or cramps that sometimes follow orgasm. Learn about  labor · Watch for signs of  labor. You may be going into labor if: 
¨ You have menstrual-like cramps, with or without nausea. ¨ You have about 4 or more contractions in 20 minutes, or about 8 or more within 1 hour, even after you have had a glass of water and are resting. ¨ You have a low, dull backache that does not go away when you change your position. ¨ You have pain or pressure in your pelvis that comes and goes in a pattern. ¨ You have intestinal cramping or flu-like symptoms, with or without diarrhea. ¨ You notice an increase or change in your vaginal discharge. Discharge may be heavy, mucus-like, watery, or streaked with blood. ¨ Your water breaks. · If you think you have  labor: ¨ Drink 2 or 3 glasses of water or juice. Not drinking enough fluids can cause contractions. ¨ Stop what you are doing, and empty your bladder. Then lie down on your left side for at least 1 hour. ¨ While lying on your side, find your breast bone. Put your fingers in the soft spot just below it. Move your fingers down toward your belly button to find the top of your uterus. Check to see if it is tight. ¨ Contractions can be weak or strong. Record your contractions for an hour. Time a contraction from the start of one contraction to the start of the next one. ¨ Single or several strong contractions without a pattern are called Frederick-Enriquez contractions. They are practice contractions but not the start of labor. They often stop if you change what you are doing. ¨ Call your doctor if you have regular contractions. Where can you learn more? Go to http://mary kay-yung.info/. Enter J832 in the search box to learn more about \"Weeks 26 to 30 of Your Pregnancy: Care Instructions. \" Current as of: 2017 Content Version: 11.4 © 0851-5914 Healthwise, Incorporated. Care instructions adapted under license by [employer] (which disclaims liability or warranty for this information). If you have questions about a medical condition or this instruction, always ask your healthcare professional. [address] any warranty or liability for your use of this information. Introducing [facility]! Dear Lupe Cochran: Thank you for requesting a [employer] account. Our records indicate that you already have an active [employer] account. You can access your account anytime at [email or web address] Did you know that you can access your hospital and ER discharge instructions at any time in [employer]? You can also review all of your test results from your hospital stay or ER visit. Additional Information If you have questions, please visit the Frequently Asked Questions section of the [employer] website at [email or web address] Remember, [employer] is NOT to be used for urgent needs. For medical emergencies, dial 911. Now available from your iPhone and Android! Please provide this summary of care documentation to your next provider. Your primary care clinician is listed as Domenico Martino. If you have any questions after today's visit, please call [phone].

## 2018-03-08 NOTE — PROGRESS NOTES
Chief Complaint   Patient presents with    Routine Prenatal Visit     Body mass index is 36.78 kg/(m^2). 1. Have you been to the ER, urgent care clinic since your last visit? Hospitalized since your last visit? No    2. Have you seen or consulted any other health care providers outside of the [address] [place] [address] since your last visit? Include any pap smears or colon screening. No    Reviewed record in preparation for visit and have necessary documentation  Pt did not bring medication to office visit for review  Information was given to pt on Advanced Directives, Living Will  Information was given on Shingles Vaccine  Opportunity was given for questions  Goals that were addressed and/or need to be completed after this appointment include: There are no preventive care reminders to display for this patient.

## 2018-03-08 NOTE — PATIENT INSTRUCTIONS
Learning About Pregnancy and Smoking  How does smoking affect your pregnancy? When you're pregnant, everything you put in your body can affect your baby. If you smoke, your baby is exposed to chemicals such as nicotine and carbon monoxide. Secondhand smoke also is a problem. Babies whose mothers breathe other people's tobacco smoke during pregnancy are more likely to have health problems. Smoking during pregnancy increases the chance of:  · Placenta problems. (The placenta is an organ that gives the baby oxygen and nutrients from the mother and gets rid of waste.)  ·  birth. The baby is born too soon. · Miscarriage or stillbirth. · Birth defects, such as a cleft lip. · Death early in life, mainly because of sudden infant death syndrome (SIDS). · A baby with a low birth weight. Babies with low weight at birth can have more health problems than those born at normal weight. Some of the problems can be serious. A baby with a low birth weight may have a greater chance for problems in adulthood, such as high blood pressure, diabetes, and heart disease. How does your being smoke-free help your baby? When you're smoke-free, you give your baby the oxygen he or she needs for a healthy start. And you protect your baby from the chemicals in tobacco smoke. If you quit smoking before you become pregnant, your risk of having a baby with a low birth weight is the same as that of a woman who does not smoke. The same is true if you quit during the first 3 months of your pregnancy. It's never too late to quit smoking. Women who quit later in pregnancy still reduce the risk of problems for their babies. It's also important to stay smoke-free after your baby is born. Ask others to not smoke around your baby. Protecting your baby from tobacco smoke will reduce your baby's chance of having breathing problems. How do you quit?   When you're ready to go smoke-free, use these tips to help you quit:  Get ready  · If you're not pregnant, choose a quit date that works for you. If you're pregnant, it's best if you can stop smoking right away. If you're pregnant and can't stop yet, try to cut down as much as you can. · Talk to your doctor about a program to help you quit. Ask your doctor about nicotine replacement or other medicine. · Get rid of your cigarettes, ashtrays, and lighters. Clean your house and clothes to get rid of the smoke smell. · If you live with someone who smokes, discuss quitting together. If this is not an option, talk to that person about not smoking around you. Make a plan for quitting  · Find ways to avoid places where others are smoking. · Think about when it might be hardest to not smoke, such as when you are restless or around others who smoke. Plan how you will handle your cravings during these times. · Change your routine. Avoid those things that make you reach for a cigarette. · Plan ways to cope with withdrawal from smoking. For example, take a walk after dinner instead of having a cigarette. Think of ways to cut down on stress in the first few weeks of quitting. · Keep trying to quit if you start smoking again. Most people who smoke will quit and restart many times before they stop for good. Each time you start smoking again, think about why you went back to smoking and why it's important to quit. · Remind yourself often about why you want to quit, and that it will get easier with time. Get support  · Ask loved ones or people who used to smoke for support and tips. · Get counseling. People who use telephone, group, or one-on-one counseling are much more likely to stop smoking. · Join a support group for people who smoke. · Call 1-800-QUIT-NOW for information and support. Follow-up care is a key part of your treatment and safety. Be sure to make and go to all appointments, and call your doctor if you are having problems.  It's also a good idea to know your test results and keep a list of the medicines you take. Where can you learn more? Go to http://mary kay-yung.info/. Enter M755 in the search box to learn more about \"Learning About Pregnancy and Smoking. \"  Current as of: 2017  Content Version: 11.4  © 7330-1876 [employer]. Care instructions adapted under license by [employer] (which disclaims liability or warranty for this information). If you have questions about a medical condition or this instruction, always ask your healthcare professional. [address] [address] any warranty or liability for your use of this information. Weeks 26 to 30 of Your Pregnancy: Care Instructions  Your Care Instructions    You are now in your last trimester of pregnancy. Your baby is growing rapidly. And you'll probably feel your baby moving around more often. Your doctor may ask you to count your baby's kicks. Your back may ache as your body gets used to your baby's size and length. If you haven't already had the Tdap shot during this pregnancy, talk to your doctor about getting it. It will help protect your  against pertussis infection. During this time, it's important to take care of yourself and pay attention to what your body needs. If you feel sexual, explore ways to be close with your partner that match your comfort and desire. Use the tips provided in this care sheet to find ways to be sexual in your own way. Follow-up care is a key part of your treatment and safety. Be sure to make and go to all appointments, and call your doctor if you are having problems. It's also a good idea to know your test results and keep a list of the medicines you take. How can you care for yourself at home? Take it easy at work  · Take frequent breaks. If possible, stop working when you are tired, and rest during your lunch hour. · Take bathroom breaks every 2 hours. · Change positions often.  If you sit for long periods, stand up and walk around. · When you stand for a long time, keep one foot on a low stool with your knee bent. After standing a lot, sit with your feet up. · Avoid fumes, chemicals, and tobacco smoke. Be sexual in your own way  · Having sex during pregnancy is okay, unless your doctor tells you not to. · You may be very interested in sex, or you may have no interest at all. · Your growing belly can make it hard to find a good position during intercourse. [place] and explore. · You may get cramps in your uterus when your partner touches your breasts. · A back rub may relieve the backache or cramps that sometimes follow orgasm. Learn about  labor  · Watch for signs of  labor. You may be going into labor if:  ¨ You have menstrual-like cramps, with or without nausea. ¨ You have about 4 or more contractions in 20 minutes, or about 8 or more within 1 hour, even after you have had a glass of water and are resting. ¨ You have a low, dull backache that does not go away when you change your position. ¨ You have pain or pressure in your pelvis that comes and goes in a pattern. ¨ You have intestinal cramping or flu-like symptoms, with or without diarrhea. ¨ You notice an increase or change in your vaginal discharge. Discharge may be heavy, mucus-like, watery, or streaked with blood. ¨ Your water breaks. · If you think you have  labor:  ¨ Drink 2 or 3 glasses of water or juice. Not drinking enough fluids can cause contractions. ¨ Stop what you are doing, and empty your bladder. Then lie down on your left side for at least 1 hour. ¨ While lying on your side, find your breast bone. Put your fingers in the soft spot just below it. Move your fingers down toward your belly button to find the top of your uterus. Check to see if it is tight. ¨ Contractions can be weak or strong. Record your contractions for an hour. Time a contraction from the start of one contraction to the start of the next one.   ¨ Single or several strong contractions without a pattern are called Bertie-Enriquez contractions. They are practice contractions but not the start of labor. They often stop if you change what you are doing. ¨ Call your doctor if you have regular contractions. Where can you learn more? Go to http://mary kay-yung.info/. Enter J718 in the search box to learn more about \"Weeks 26 to 30 of Your Pregnancy: Care Instructions. \"  Current as of: March 16, 2017  Content Version: 11.4  © 3853-0135 [employer]. Care instructions adapted under license by [employer] (which disclaims liability or warranty for this information). If you have questions about a medical condition or this instruction, always ask your healthcare professional. [address] any warranty or liability for your use of this information.

## 2018-03-08 NOTE — PROGRESS NOTES
Return OB Visit       Subjective:   Bhakti Lopez 40 y.o. Adali Sharma   CEDRIC: 6/8/2018, by Last Menstrual Period  GA:  26w6d. No complaints at this time. Diet: well balanced, healthy   Water intake: adequate   Prenatal Vitamins: taking     She is feeling her baby move. She denies vaginal bleeding, discharge or loss of fluid. She denies nausea, vomiting, severe abdominal pain or cramping. She denies dysuria. She denies headaches, dizziness or vision changes. She denies excessive swelling of extremities. Past Medical History - Reviewed today  Patient Active Problem List   Diagnosis Code    Tobacco abuse Z72.0    Gastroesophageal reflux disease without esophagitis K21.9    Elderly multigravida in first trimester O09.521    History of pre-eclampsia Z87.59         Medications - Reviewed today  Current Outpatient Prescriptions   Medication Sig Dispense Refill    azithromycin (ZITHROMAX) 250 mg tablet Take 2 tablets today, then take 1 tablet daily 6 Tab 0    cetirizine (ZYRTEC) 10 mg tablet Take 1 Tab by mouth daily. 90 Tab 1    PRENATAL VIT CALC,IRON,FOLIC (PRENATAL VITAMIN PO) Take  by mouth.  raNITIdine (ZANTAC) 75 mg tablet Take 1 Tab by mouth two (2) times a day. 30 Tab 3         Allergies - Reviewed today  Allergies   Allergen Reactions    Ceclor [Cefaclor] Hives    Penicillin G Hives         Family History - Reviewed today  Family History   Problem Relation Age of Onset    Breast Cancer Mother     Heart Disease Mother     Cancer Father          Social History - Reviewed today  Social History     Social History    Marital status:      Spouse name: N/A    Number of children: N/A    Years of education: N/A     Occupational History    Not on file.      Social History Main Topics    Smoking status: Former Smoker     Packs/day: 0.25     Years: 10.00     Types: Cigarettes     Quit date: 6/16/2015    Smokeless tobacco: Never Used    Alcohol use No    Drug use: No    Sexual activity: Yes     Partners: Male     Birth control/ protection: Condom     Other Topics Concern    Not on file     Social History Narrative         Health Maintenance - Reviewed today    Immunizations:     -Influenza: given on 10/3/2017     -Tdap: Will give today        Screening:     -Pap smear: 2016: ASCUS , will repap in 3 years , 2019      Objective:     Visit Vitals    /77 (BP 1 Location: Left arm, BP Patient Position: Sitting)    Pulse 92    Temp 98 °F (36.7 °C) (Oral)    Resp 16    Ht 5' 5\" (1.651 m)    Wt 221 lb (100.2 kg)    LMP 2017 (Approximate)    SpO2 97%    BMI 36.78 kg/m2       Physical Exam:  GENERAL APPEARANCE: alert, well appearing, in no apparent distress  LUNGS: clear to auscultation, no wheezes, rales or rhonchi, symmetric air entry  HEART: regular rate and rhythm, no murmurs  ABDOMEN: soft, nontender, nondistended, no abnormal masses, no epigastric pain, fundus not palpable, FHT present and skin scar: transverse, 153 bpm  BACK: no CVA tenderness  UTERUS: gravid  EXTREMITIES: no redness or tenderness in the calves or thighs, no edema  NEUROLOGICAL: alert, oriented, normal speech, no focal findings or movement disorder noted  PELVIC: examination not indicated. Assessment     Pt is a 41 yo  with SIUP at  26w6d  C/w LMP and first trimester ultrasound.       Prenatal Labs : O+, HIV nonreactive,RPR non reactive, HB sAg nonreactive, Rubella immune, VZV immune.     GPTE8mj :106    ICD-10-CM ICD-9-CM    1. Encounter for supervision of elderly multigravida in second trimester, antepartum O09.522 V23.82 CBC W/O DIFF      AMB POC URINALYSIS DIP STICK AUTO W/O MICRO      CULTURE, URINE      METABOLIC PANEL, COMPREHENSIVE      URIC ACID      LD      PROT+CREATU (RANDOM)   2. History of pre-eclampsia Z87.59 V13.29    3. Tobacco abuse Z72.0 305.1    4.  Gastroesophageal reflux disease without esophagitis K21.9 530.81          Plan        SIUP   - U/A showed  leuks 1+, protein 1+ otherwise negative   - Urine culture sent  - First trimester ultrasound:CRL consistent with LMP. Good cardiac activity noted    - 2nd trimester screen for Down's Syndrome discussed: NT normal, CRL consistent with dates   - Expecting baby boy  - Anticipate c/s, but considering TOLAC  - OGTT:  1 hr : 106   - Follow up in 2 weeks      Hx of LTCS 2/2  failed induction at 45 weeks: ( 10 years ago)   - Pt wants a repeat C/S  - Continue to offer TOLAC as an option  - Pt to follow up with Lissette hairston on 3/30 to establish care with [facility]       Hx of PreE:   Blood pressure wnl , UA: without protein  - ASA, started at 12 weeks  - baseline CBC, CMP, uric acid , ldh, protein/cr ratio       GERD   Currently  asymptomatic,symptoms have resolved with current regimen   - Pt was advised pt of lifestyle modifications , elevate the head of your bed, do not recline 30 mins after a meal.   - Ranitidine 75mg bid         AMA  Second trimester ultrasound : normal fetal and maternal anatomy,normal fetal movements, normal fluid.  Negative/low-risk fetal cell-free DNA screening for fetal Trisomy 21/18/13.   - Advised pt to exercise daily, low fat, low salt,low sugar diet   - MFM : recommended follow up as clinically indicated.               Hx of Tobacco Abuse:  2.5 ppy, now 1-2 cigs a  week    -Counseled on the effects of smoking to include   including spontaneous pregnancy loss, placental abruption,  premature rupture of membranes, placenta previa,  labor and delivery, low birth weight.      - The patient was counseled on the dangers of tobacco use, and was advised to quit. Pearl Boas strategies to maximize success, including removing cigarettes and smoking materials from environment, stress management and support of family/friends.          Hx of Obesity   Pre-pregnancy weight : 171  Last 3 Recorded Weights in this Encounter    18 1522   Weight: 221 lb (100.2 kg)   Pt has gained a total of 50lbs this pregnancy, 8lbs since th     - Discussed the patient's BMI with her.  The BMI follow up plan is as follows: I have counseled this patient on diet and exercise regimens.      Hx of Ectopic Pregnancy and first trimester loss   - Second trimester ultrasound showing normal fetal and maternal anatomy       Orders Placed This Encounter    CULTURE, URINE    CBC W/O DIFF    METABOLIC PANEL, COMPREHENSIVE    URIC ACID    LDH    PROT+CREATU (RANDOM)    AMB POC URINALYSIS DIP STICK AUTO W/O MICRO         Labor precautions discussed, including: Regular painful contractions, lasting for greater than one hour, taking your breath away; any vaginal bleeding; any leakage of fluid; or absent or decreased fetal movement. Call M.D. on call if any of these symptoms or signs occur. I have discussed the diagnosis with the patient and the intended plan as seen in the above orders. The patient has received an after-visit summary and questions were answered concerning future plans. I have discussed medication side effects and warnings with the patient as well.     Patient discussed with Darshana Quintero MD  Family Medicine Resident

## 2018-03-09 LAB
ALBUMIN SERPL-MCNC: 3.4 G/DL (ref 3.5–5.5)
ALBUMIN/GLOB SERPL: 1.3 {RATIO} (ref 1.2–2.2)
ALP SERPL-CCNC: 73 IU/L (ref 39–117)
ALT SERPL-CCNC: 18 IU/L (ref 0–32)
AST SERPL-CCNC: 20 IU/L (ref 0–40)
BILIRUB SERPL-MCNC: <0.2 MG/DL (ref 0–1.2)
BILIRUB UR QL STRIP: NEGATIVE
BUN SERPL-MCNC: 11 MG/DL (ref 6–20)
BUN/CREAT SERPL: 24 (ref 9–23)
CALCIUM SERPL-MCNC: 9.2 MG/DL (ref 8.7–10.2)
CHLORIDE SERPL-SCNC: 101 MMOL/L (ref 96–106)
CO2 SERPL-SCNC: 21 MMOL/L (ref 18–29)
CREAT SERPL-MCNC: 0.46 MG/DL (ref 0.57–1)
CREAT UR-MCNC: 187.9 MG/DL
ERYTHROCYTE [DISTWIDTH] IN BLOOD BY AUTOMATED COUNT: 13.9 % (ref 12.3–15.4)
GFR SERPLBLD CREATININE-BSD FMLA CKD-EPI: 128 ML/MIN/1.73
GFR SERPLBLD CREATININE-BSD FMLA CKD-EPI: 148 ML/MIN/1.73
GLOBULIN SER CALC-MCNC: 2.6 G/DL (ref 1.5–4.5)
GLUCOSE 1H P 50 G GLC PO SERPL-MCNC: 106 MG/DL (ref 65–139)
GLUCOSE SERPL-MCNC: 97 MG/DL (ref 65–99)
GLUCOSE UR-MCNC: NEGATIVE MG/DL
HCT VFR BLD AUTO: 33.7 % (ref 34–46.6)
HGB BLD-MCNC: 11.1 G/DL (ref 11.1–15.9)
KETONES P FAST UR STRIP-MCNC: NEGATIVE MG/DL
LDH SERPL-CCNC: 157 IU/L (ref 119–226)
MCH RBC QN AUTO: 30.5 PG (ref 26.6–33)
MCHC RBC AUTO-ENTMCNC: 32.9 G/DL (ref 31.5–35.7)
MCV RBC AUTO: 93 FL (ref 79–97)
PH UR STRIP: 6 [PH] (ref 4.6–8)
PLATELET # BLD AUTO: 301 X10E3/UL (ref 150–379)
POTASSIUM SERPL-SCNC: 4.1 MMOL/L (ref 3.5–5.2)
PROT SERPL-MCNC: 6 G/DL (ref 6–8.5)
PROT UR QL STRIP: NORMAL
PROT UR-MCNC: 17.1 MG/DL
PROT/CREAT UR: 91 MG/G CREAT (ref 0–200)
RBC # BLD AUTO: 3.64 X10E6/UL (ref 3.77–5.28)
SODIUM SERPL-SCNC: 139 MMOL/L (ref 134–144)
SP GR UR STRIP: 1.02 (ref 1–1.03)
UA UROBILINOGEN AMB POC: NORMAL (ref 0.2–1)
URATE SERPL-MCNC: 4.1 MG/DL (ref 2.5–7.1)
URINALYSIS CLARITY POC: CLEAR
URINALYSIS COLOR POC: YELLOW
URINE BLOOD POC: NEGATIVE
URINE LEUKOCYTES POC: NORMAL
URINE NITRITES POC: NEGATIVE
WBC # BLD AUTO: 12.9 X10E3/UL (ref 3.4–10.8)

## 2018-03-10 LAB — BACTERIA UR CULT: NO GROWTH

## 2018-03-13 NOTE — PROGRESS NOTES
Pt is a 40 y.o.  at 27w4d by LMP c/w 1st trimester US for routine prenatal care. Rh: Positive  GBS: N/A  Vaginal bleeding: NO  LOF: NO  Fetal movement: Normal  FHT's: 150's  Delivery plan: RLTCS vs TOLAC    BP mildly elevated today, pt with h/o pre-eclampsia in prior pregnancy, unable to give urine specimen. Will get baseline PIH labs and check urine as soon as possible (pt to drop off).

## 2018-03-30 ENCOUNTER — ROUTINE PRENATAL (OUTPATIENT)
Dept: FAMILY MEDICINE CLINIC | Age: 38
End: 2018-03-30

## 2018-03-30 VITALS
WEIGHT: 221.8 LBS | DIASTOLIC BLOOD PRESSURE: 77 MMHG | RESPIRATION RATE: 18 BRPM | TEMPERATURE: 98 F | BODY MASS INDEX: 36.96 KG/M2 | OXYGEN SATURATION: 98 % | HEIGHT: 65 IN | SYSTOLIC BLOOD PRESSURE: 121 MMHG | HEART RATE: 80 BPM

## 2018-03-30 DIAGNOSIS — Z3A.30 30 WEEKS GESTATION OF PREGNANCY: Primary | ICD-10-CM

## 2018-03-30 LAB
BILIRUB UR QL STRIP: NEGATIVE
GLUCOSE UR-MCNC: NEGATIVE MG/DL
KETONES P FAST UR STRIP-MCNC: NEGATIVE MG/DL
PH UR STRIP: 7.5 [PH] (ref 4.6–8)
PROT UR QL STRIP: NORMAL
SP GR UR STRIP: 1.02 (ref 1–1.03)
UA UROBILINOGEN AMB POC: NORMAL (ref 0.2–1)
URINALYSIS CLARITY POC: NORMAL
URINALYSIS COLOR POC: YELLOW
URINE BLOOD POC: NEGATIVE
URINE LEUKOCYTES POC: NORMAL
URINE NITRITES POC: NEGATIVE

## 2018-03-30 NOTE — PATIENT INSTRUCTIONS
Learning About Pregnancy and Smoking  How does smoking affect your pregnancy? When you're pregnant, everything you put in your body can affect your baby. If you smoke, your baby is exposed to chemicals such as nicotine and carbon monoxide. Secondhand smoke also is a problem. Babies whose mothers breathe other people's tobacco smoke during pregnancy are more likely to have health problems. Smoking during pregnancy increases the chance of:  · Placenta problems. (The placenta is an organ that gives the baby oxygen and nutrients from the mother and gets rid of waste.)  ·  birth. The baby is born too soon. · Miscarriage or stillbirth. · Birth defects, such as a cleft lip. · Death early in life, mainly because of sudden infant death syndrome (SIDS). · A baby with a low birth weight. Babies with low weight at birth can have more health problems than those born at normal weight. Some of the problems can be serious. A baby with a low birth weight may have a greater chance for problems in adulthood, such as high blood pressure, diabetes, and heart disease. How does your being smoke-free help your baby? When you're smoke-free, you give your baby the oxygen he or she needs for a healthy start. And you protect your baby from the chemicals in tobacco smoke. If you quit smoking before you become pregnant, your risk of having a baby with a low birth weight is the same as that of a woman who does not smoke. The same is true if you quit during the first 3 months of your pregnancy. It's never too late to quit smoking. Women who quit later in pregnancy still reduce the risk of problems for their babies. It's also important to stay smoke-free after your baby is born. Ask others to not smoke around your baby. Protecting your baby from tobacco smoke will reduce your baby's chance of having breathing problems. How do you quit?   When you're ready to go smoke-free, use these tips to help you quit:  Get ready  · If you're not pregnant, choose a quit date that works for you. If you're pregnant, it's best if you can stop smoking right away. If you're pregnant and can't stop yet, try to cut down as much as you can. · Talk to your doctor about a program to help you quit. Ask your doctor about nicotine replacement or other medicine. · Get rid of your cigarettes, ashtrays, and lighters. Clean your house and clothes to get rid of the smoke smell. · If you live with someone who smokes, discuss quitting together. If this is not an option, talk to that person about not smoking around you. Make a plan for quitting  · Find ways to avoid places where others are smoking. · Think about when it might be hardest to not smoke, such as when you are restless or around others who smoke. Plan how you will handle your cravings during these times. · Change your routine. Avoid those things that make you reach for a cigarette. · Plan ways to cope with withdrawal from smoking. For example, take a walk after dinner instead of having a cigarette. Think of ways to cut down on stress in the first few weeks of quitting. · Keep trying to quit if you start smoking again. Most people who smoke will quit and restart many times before they stop for good. Each time you start smoking again, think about why you went back to smoking and why it's important to quit. · Remind yourself often about why you want to quit, and that it will get easier with time. Get support  · Ask loved ones or people who used to smoke for support and tips. · Get counseling. People who use telephone, group, or one-on-one counseling are much more likely to stop smoking. · Join a support group for people who smoke. · Call 1-800-QUIT-NOW for information and support. Follow-up care is a key part of your treatment and safety. Be sure to make and go to all appointments, and call your doctor if you are having problems.  It's also a good idea to know your test results and keep a list of the medicines you take. Where can you learn more? Go to http://mary kay-yung.info/. Enter Q653 in the search box to learn more about \"Learning About Pregnancy and Smoking. \"  Current as of: March 16, 2017  Content Version: 11.4  © 5445-5761 [employer], [employer]. Care instructions adapted under license by [employer] (which disclaims liability or warranty for this information). If you have questions about a medical condition or this instruction, always ask your healthcare professional. [place] [address] any warranty or liability for your use of this information. Will need to schedule another appointment with [facility] for BMI concerns.

## 2018-03-30 NOTE — PROGRESS NOTES
Chief Complaint   Patient presents with    Routine Prenatal Visit      30 weeks No bleeding or LOF +BM     1. Have you been to the ER, urgent care clinic since your last visit? Hospitalized since your last visit? No    2. Have you seen or consulted any other health care providers outside of the [facility] since your last visit? Include any pap smears or colon screening.  No

## 2018-03-30 NOTE — MR AVS SNAPSHOT
[address] [phone] Patient: Leonel Goldstein MRN: [id number] Visit Information Date & Time Provider Department Dept. Phone Encounter #  
 3/30/2018  2:00 PM Charan Pillai MD [address] [facility] [phone] [id number] Follow-up Instructions Return in about 2 weeks (around 4/13/2018) for routine prenatal . Upcoming Health Maintenance Date Due  
 PAP AKA CERVICAL CYTOLOGY 1/7/2021 DTaP/Tdap/Td series (3 - Td) 3/8/2028 Allergies as of 3/30/2018  Review Complete On: 3/30/2018 By: Jennifer Nix Severity Noted Reaction Type Reactions Ceclor [Cefaclor]  04/18/2012    Hives Penicillin G  04/18/2012    Hives Current Immunizations  Reviewed on 5/29/2013 Name Date Influenza Vaccine 10/3/2017 MMR Vaccine 3/25/2011 Tdap 3/8/2018, 12/28/2010 Not reviewed this visit You Were Diagnosed With   
  
 Codes Comments 30 weeks gestation of pregnancy    -  Primary ICD-10-CM: Z3A.30 ICD-9-CM: V22.2 Vitals BP Pulse Temp Resp Height(growth percentile) Weight(growth percentile) 121/77 (BP 1 Location: Left arm, BP Patient Position: Sitting) 80 98 °F (36.7 °C) (Oral) 18 5' 5\" (1.651 m) 221 lb 12.8 oz (100.6 kg) LMP SpO2 BMI OB Status Smoking Status 09/01/2017 (Approximate) 98% 36.91 kg/m2 Pregnant Former Smoker BMI and BSA Data Body Mass Index Body Surface Area  
 36.91 kg/m 2 2.15 m 2 Preferred Pharmacy Pharmacy Name Phone [address] [place], [place] - [address] [phone] Your Updated Medication List  
  
   
This list is accurate as of 3/30/18  2:43 PM.  Always use your most recent med list.  
  
  
  
  
 cetirizine 10 mg tablet Commonly known as:  ZYRTEC Take 1 Tab by mouth daily. PRENATAL VITAMIN PO Take  by mouth. raNITIdine 75 mg tablet Commonly known as:  ZANTAC Take 1 Tab by mouth two (2) times a day. We Performed the Following AMB POC URINALYSIS DIP STICK AUTO W/O MICRO [31967 CPT(R)] CULTURE, URINE [id number] CPT(R)] Follow-up Instructions Return in about 2 weeks (around 2018) for routine prenatal . Patient Instructions Learning About Pregnancy and Smoking How does smoking affect your pregnancy? When you're pregnant, everything you put in your body can affect your baby. If you smoke, your baby is exposed to chemicals such as nicotine and carbon monoxide. Secondhand smoke also is a problem. Babies whose mothers breathe other people's tobacco smoke during pregnancy are more likely to have health problems. Smoking during pregnancy increases the chance of: · Placenta problems. (The placenta is an organ that gives the baby oxygen and nutrients from the mother and gets rid of waste.) ·  birth. The baby is born too soon. · Miscarriage or stillbirth. · Birth defects, such as a cleft lip. · Death early in life, mainly because of sudden infant death syndrome (SIDS). · A baby with a low birth weight. Babies with low weight at birth can have more health problems than those born at normal weight. Some of the problems can be serious. A baby with a low birth weight may have a greater chance for problems in adulthood, such as high blood pressure, diabetes, and heart disease. How does your being smoke-free help your baby? When you're smoke-free, you give your baby the oxygen he or she needs for a healthy start. And you protect your baby from the chemicals in tobacco smoke. If you quit smoking before you become pregnant, your risk of having a baby with a low birth weight is the same as that of a woman who does not smoke. The same is true if you quit during the first 3 months of your pregnancy. It's never too late to quit smoking.  Women who quit later in pregnancy still reduce the risk of problems for their babies. It's also important to stay smoke-free after your baby is born. Ask others to not smoke around your baby. Protecting your baby from tobacco smoke will reduce your baby's chance of having breathing problems. How do you quit? When you're ready to go smoke-free, use these tips to help you quit: 
Get ready · If you're not pregnant, choose a quit date that works for you. If you're pregnant, it's best if you can stop smoking right away. If you're pregnant and can't stop yet, try to cut down as much as you can. · Talk to your doctor about a program to help you quit. Ask your doctor about nicotine replacement or other medicine. · Get rid of your cigarettes, ashtrays, and lighters. Clean your house and clothes to get rid of the smoke smell. · If you live with someone who smokes, discuss quitting together. If this is not an option, talk to that person about not smoking around you. Make a plan for quitting · Find ways to avoid places where others are smoking. · Think about when it might be hardest to not smoke, such as when you are restless or around others who smoke. Plan how you will handle your cravings during these times. · Change your routine. Avoid those things that make you reach for a cigarette. · Plan ways to cope with withdrawal from smoking. For example, take a walk after dinner instead of having a cigarette. Think of ways to cut down on stress in the first few weeks of quitting. · Keep trying to quit if you start smoking again. Most people who smoke will quit and restart many times before they stop for good. Each time you start smoking again, think about why you went back to smoking and why it's important to quit. · Remind yourself often about why you want to quit, and that it will get easier with time. Get support · Ask loved ones or people who used to smoke for support and tips. · Get counseling.  People who use telephone, group, or one-on-one counseling are much more likely to stop smoking. · Join a support group for people who smoke. · Call 2-800-QUIT-NOW for information and support. Follow-up care is a key part of your treatment and safety. Be sure to make and go to all appointments, and call your doctor if you are having problems. It's also a good idea to know your test results and keep a list of the medicines you take. Where can you learn more? Go to http://mary kay-yung.info/. Enter L423 in the search box to learn more about \"Learning About Pregnancy and Smoking. \" Current as of: March 16, 2017 Content Version: 11.4 © 7074-9767 [employer] Care instructions adapted under license by [employer]) (which disclaims liability or warranty for this information). If you have questions about a medical condition or this instruction, always ask your healthcare professional. [address] any warranty or liability for your use of this information. Will need to schedule another appointment with [facility] for BMI concerns. Introducing [facility]! Dear Edin Nicole: Thank you for requesting a divorce360 account. Our records indicate that you already have an active [email or web address] account. You can access your account anytime at [email or web address] Did you know that you can access your hospital and ER discharge instructions at any time in divorce360? You can also review all of your test results from your hospital stay or ER visit. Additional Information If you have questions, please visit the Frequently Asked Questions section of the [email or web address] website at [email or web address] Remember, [email or web address] is NOT to be used for urgent needs. For medical emergencies, dial 911. Now available from your iPhone and Android! Please provide this summary of care documentation to your next provider. Your primary care clinician is listed as Richelle Hoskins. If you have any questions after today's visit, please call [phone].

## 2018-03-30 NOTE — PROGRESS NOTES
40year old  at 30 weeks   Hx CD, failed induction 10 years ago  On ASA 81 mg a day  Down to 1 cig per day    FHT 140s    One hour glucola = 106    + fetal movement    Desires repeat CD     I reviewed with the resident the medical history and the resident's findings on the physical examination. I discussed with the resident the patient's diagnosis and concur with the plan.

## 2018-03-30 NOTE — PROGRESS NOTES
Return OB Visit       Subjective:   Ion Sanders 40 y.o. Hans Spears  EDC: 6/8/2018, by Last Menstrual Period  GA:  30w0d. No complaints at this time. Diet: well balanced, healthy   Water intake: adequate   Prenatal Vitamins: taking     She is feeling her baby move. She denies vaginal bleeding, discharge or loss of fluid. She denies nausea, vomiting, severe abdominal pain or cramping. She denies dysuria. She denies headaches, dizziness or vision changes. She denies excessive swelling of extremities. Past Medical History - Reviewed today  Patient Active Problem List   Diagnosis Code    Tobacco abuse Z72.0    Gastroesophageal reflux disease without esophagitis K21.9    Elderly multigravida in first trimester O09.521    History of pre-eclampsia Z87.59         Medications - Reviewed today        Allergies - Reviewed today  Allergies   Allergen Reactions    Ceclor [Cefaclor] Hives    Penicillin G Hives         Family History - Reviewed today  Family History   Problem Relation Age of Onset    Breast Cancer Mother     Heart Disease Mother     Cancer Father          Social History - Reviewed today  Social History     Social History    Marital status:      Spouse name: N/A    Number of children: N/A    Years of education: N/A     Occupational History    Not on file.      Social History Main Topics    Smoking status: Former Smoker     Packs/day: 0.25     Years: 10.00     Types: Cigarettes     Quit date: 6/16/2015    Smokeless tobacco: Never Used    Alcohol use No    Drug use: No    Sexual activity: Yes     Partners: Male     Birth control/ protection: Condom     Other Topics Concern    Not on file     Social History Narrative         Health Maintenance - Reviewed today      Immunizations:     -Influenza: given on 10/3/2017     -Tdap: Will give today        Screening:     -Pap smear: 1/7/2016: ASCUS , will repap in 3 years , 1/2019      Objective:     Visit Vitals    /77 (BP 1 Location: Left arm, BP Patient Position: Sitting)    Pulse 80    Temp 98 °F (36.7 °C) (Oral)    Resp 18    Ht 5' 5\" (1.651 m)    Wt 221 lb 12.8 oz (100.6 kg)    LMP 09/01/2017 (Approximate)    SpO2 98%    BMI 36.91 kg/m2       Physical Exam:  GENERAL APPEARANCE: alert, well appearing, in no apparent distress  LUNGS: clear to auscultation, no wheezes, rales or rhonchi, symmetric air entry  HEART: regular rate and rhythm, no murmurs  ABDOMEN: soft, nontender, nondistended, no abnormal masses, no epigastric pain, obese, fundus soft, nontender 33 cm and FHT present, 140s bpm, Previous transverse scar intact   BACK: no CVA tenderness  UTERUS: gravid  EXTREMITIES: no redness or tenderness in the calves or thighs, no edema  NEUROLOGICAL: alert, oriented, normal speech, no focal findings or movement disorder noted  PELVIC: examination not indicated. Assessment       ICD-10-CM ICD-9-CM    1. 30 weeks gestation of pregnancy Z3A.30 V22.2 AMB POC URINALYSIS DIP STICK AUTO W/O MICRO      CULTURE, URINE         Plan      Pt is a 39 yo [id number] with SIUP at  30w0d  C/w LMP and first trimester ultrasound. Anticipate LTCS, here to establish care and schedule LTCS      Prenatal Labs : O+, HIV nonreactive,RPR non reactive, HB sAg nonreactive, Rubella immune, VZV immune. WMEM6uo :106. SIUP   - U/A showed  leuks 1+, protein 1+ otherwise negative   - Urine culture sent: no growth  - First trimester ultrasound:CRL consistent with LMP.  Good cardiac activity noted    - 2nd trimester screen for Down's Syndrome discussed: NT normal, CRL consistent with dates   - Expecting baby boy  - Anticipate c/s, but considering TOLAC  - OGTT:  1 hr : 106 on  3/11/2018  - Follow up in 2 weeks      Hx of LTCS 2/2  failed induction at 45 weeks: ( 10 years ago)   - Desires repeat C/S  - Continue to offer TOLAC as an option  - Will schedule for 6/4  At 38w0d for LTCS at [facility]      Hx of PreE:   CBC: WBC: 12.9, , Hemoglobin 11.1  CMP: Cr.0.46,  LDH :157wnl,Uric Acid: 4.1, urine cr/pr: wnl  Blood pressure wnl   - ASA, started at 12 weeks      GERD   Currently  asymptomatic,symptoms have resolved with current regimen   - Pt was advised pt of lifestyle modifications , elevate the head of your bed, do not recline 30 mins after a meal.   - Ranitidine 75mg bid          AMA  Second trimester ultrasound : normal fetal and maternal anatomy,normal fetal movements, normal fluid. Negative/low-risk fetal cell-free DNA screening for fetal Trisomy 21/18/13.   - Advised pt to exercise daily, low fat, low salt,low sugar diet   - Will schedule follow up with MFM at 34 weeks               Hx of Tobacco Abuse:  2.5 ppy, now 1-2 cigs a  week    -Counseled on the effects of smoking to including spontaneous pregnancy loss, placental abruption,  premature rupture of membranes, placenta previa,  labor and delivery, low birth weight.    - The patient was counseled on the dangers of tobacco use, and was advised to quit. Christen Sprinkle strategies to maximize success, including removing cigarettes and smoking materials from environment, stress management and support of family/friends.          Hx of Obesity   Pre-pregnancy weight : 171,  Current today: 221lb  - I have reviewed/discussed the above normal BMI with the patient. I have recommended the following interventions: dietary management education, guidance, and counseling, encourage exercise, monitor weight and prescribed dietary intake . Orders Placed This Encounter    CULTURE, URINE    AMB POC URINALYSIS DIP STICK AUTO W/O MICRO       Labor precautions discussed, including: Regular painful contractions, lasting for greater than one hour, taking your breath away; any vaginal bleeding; any leakage of fluid; or absent or decreased fetal movement. Call M.D. on call if any of these symptoms or signs occur.     I have discussed the diagnosis with the patient and the intended plan as seen in the above orders. The patient has received an after-visit summary and questions were answered concerning future plans. I have discussed medication side effects and warnings with the patient as well.     Patient discussed with Dr. Juan Oliveira MD  Family Medicine Resident

## 2018-03-31 LAB — BACTERIA UR CULT: NO GROWTH

## 2018-04-02 ENCOUNTER — HOSPITAL ENCOUNTER (EMERGENCY)
Age: 38
Discharge: HOME | End: 2018-04-02
Attending: FAMILY MEDICINE | Admitting: OBSTETRICS & GYNECOLOGY
Payer: COMMERCIAL

## 2018-04-02 VITALS
TEMPERATURE: 98.5 F | HEART RATE: 81 BPM | SYSTOLIC BLOOD PRESSURE: 119 MMHG | DIASTOLIC BLOOD PRESSURE: 68 MMHG | RESPIRATION RATE: 16 BRPM

## 2018-04-02 LAB
A1 MICROGLOB PLACENTAL VAG QL: NEGATIVE
CONTROL LINE PRESENT?: YES
EXPIRATION DATE: NORMAL
INTERNAL NEGATIVE CONTROL: NEGATIVE
KIT LOT NO.: NORMAL

## 2018-04-02 PROCEDURE — 84112 EVAL AMNIOTIC FLUID PROTEIN: CPT | Performed by: OBSTETRICS & GYNECOLOGY

## 2018-04-02 PROCEDURE — 75810000275 HC EMERGENCY DEPT VISIT NO LEVEL OF CARE

## 2018-04-02 RX ORDER — ASPIRIN 81 MG/1
81 TABLET ORAL DAILY
COMMUNITY
End: 2018-05-18

## 2018-04-02 NOTE — IP AVS SNAPSHOT
[address] [facility] 
 
 
 [address] [facility] 
[phone] Patient: Valentine Paz MRN: [id number] EBU:0/89/8944 About your hospitalization You were admitted on:  N/A You last received care in the:  [facility] 2 LABOR & DELIVERY You were discharged on:  April 2, 2018 Why you were hospitalized Your primary diagnosis was:  Not on File Follow-up Information Follow up With Details Comments Contact Info MD Bull Chang [address] 
[phone] Your Scheduled Appointments Thursday April 12, 2018  4:00 PM EDT  
OB VISIT with Kartik Charles MD  
[facility] ([facility]) [address] [place] [address] [phone] Discharge Orders None A check shea indicates which time of day the medication should be taken. My Medications ASK your doctor about these medications Instructions Each Dose to Equal  
 Morning Noon Evening Bedtime  
 aspirin delayed-release 81 mg tablet Your last dose was: Your next dose is: Take 81 mg by mouth daily. Indications: hx pre-eclampcia 81 mg  
    
   
   
   
  
 cetirizine 10 mg tablet Commonly known as:  ZYRTEC Your last dose was: Your next dose is: Take 1 Tab by mouth daily. 10 mg PRENATAL VITAMIN PO Your last dose was: Your next dose is: Take  by mouth. raNITIdine 75 mg tablet Commonly known as:  ZANTAC Your last dose was: Your next dose is: Take 1 Tab by mouth two (2) times a day. 75 mg Discharge Instructions Weeks 30 to 32 of Your Pregnancy: Care Instructions Your Care Instructions You have made it to the final months of your pregnancy.  By now, your baby is really starting to look like a baby, with hair and plump skin. As you enter the final weeks of pregnancy, the reality of having a baby may start to set in. This is the time to settle on a name, get your household in order, set up a safe nursery, and find quality  if needed. Doing these things in advance will allow you to focus on caring for and enjoying your new baby. You may also want to have a tour of your hospital's labor and delivery unit to get a better idea of what to expect while you are in the hospital. 
During these last months, it is very important to take good care of yourself and pay attention to what your body needs. If your doctor says it is okay for you to work, don't push yourself too hard. Use the tips provided in this care sheet to ease heartburn and care for varicose veins. If you haven't already had the Tdap shot during this pregnancy, talk to your doctor about getting it. It will help protect your  against pertussis infection. Follow-up care is a key part of your treatment and safety. Be sure to make and go to all appointments, and call your doctor if you are having problems. It's also a good idea to know your test results and keep a list of the medicines you take. How can you care for yourself at home? Pay attention to your baby's movements · You should feel your baby move several times every day. · Your baby now turns less, and kicks and jabs more. · Your baby sleeps 20 to 45 minutes at a time and is more active at certain times of day. · If your doctor wants you to count your baby's kicks: 
¨ Empty your bladder, and lie on your side or relax in a comfortable chair. ¨ Write down your start time. ¨ Pay attention only to your baby's movements. Count any movement except hiccups. ¨ After you have counted 10 movements, write down your stop time. ¨ Write down how many minutes it took for your baby to move 10 times. ¨ If an hour goes by and you have not recorded 10 movements, have something to eat or drink and then count for another hour. If you do not record 10 movements in either hour, call your doctor. Ease heartburn · Eat small, frequent meals. · Do not eat chocolate, peppermint, or very spicy foods. Avoid drinks with caffeine, such as coffee, tea, and sodas. · Avoid bending over or lying down after meals. · Talk a short walk after you eat. · If heartburn is a problem at night, do not eat for 2 hours before bedtime. · Take antacids like Mylanta, Maalox, Rolaids, or Tums. Do not take antacids that have sodium bicarbonate. Care for varicose veins · Varicose veins are blood vessels that stretch out with the extra blood during pregnancy. Your legs may ache or throb. Most varicose veins will go away after the birth. · Avoid standing for long periods of time. Sit with your legs crossed at the ankles, not the knees. · Sit with your feet propped up. · Avoid tight clothing or stockings. Wear support hose. · Exercise regularly. Try walking for at least 30 minutes a day. Where can you learn more? Go to http://mary kay-yung.info/. Enter M394 in the search box to learn more about \"Weeks 30 to 32 of Your Pregnancy: Care Instructions. \" Current as of: 2017 Content Version: 11.4 © 1365-2925 Wheely. Care instructions adapted under license by [employer] (which disclaims liability or warranty for this information). If you have questions about a medical condition or this instruction, always ask your healthcare professional. [place] [address] any warranty or liability for your use of this information. Pregnancy Precautions: Care Instructions Your Care Instructions There is no sure way to prevent labor before your due date ( labor) or to prevent most other pregnancy problems.  But there are things you can do to increase your chances of a healthy pregnancy. Go to your appointments, follow your doctor's advice, and take good care of yourself. Eat well, and exercise (if your doctor agrees). And make sure to drink plenty of water. Follow-up care is a key part of your treatment and safety. Be sure to make and go to all appointments, and call your doctor if you are having problems. It's also a good idea to know your test results and keep a list of the medicines you take. How can you care for yourself at home? · Make sure you go to your prenatal appointments. At each visit, your doctor will check your blood pressure. Your doctor will also check to see if you have protein in your urine. High blood pressure and protein in urine are signs of preeclampsia. This condition can be dangerous for you and your baby. · Drink plenty of fluids, enough so that your urine is light yellow or clear like water. Dehydration can cause contractions. If you have kidney, heart, or liver disease and have to limit fluids, talk with your doctor before you increase the amount of fluids you drink. · Tell your doctor right away if you notice any symptoms of an infection, such as: ¨ Burning when you urinate. ¨ A foul-smelling discharge from your vagina. ¨ Vaginal itching. ¨ Unexplained fever. ¨ Unusual pain or soreness in your uterus or lower belly. · Eat a balanced diet. Include plenty of foods that are high in calcium and iron. ¨ Foods high in calcium include milk, cheese, yogurt, almonds, and broccoli. ¨ Foods high in iron include red meat, shellfish, poultry, eggs, beans, raisins, whole-grain bread, and leafy green vegetables. · Do not smoke. If you need help quitting, talk to your doctor about stop-smoking programs and medicines. These can increase your chances of quitting for good. · Do not drink alcohol or use illegal drugs. · Follow your doctor's directions about activity.  Your doctor will let you know how much, if any, exercise you can do. · Ask your doctor if you can have sex. If you are at risk for early labor, your doctor may ask you to not have sex. · Take care to prevent falls. During pregnancy, your joints are loose, and your balance is off. Sports such as bicycling, skiing, or in-line skating can increase your risk of falling. And don't ride horses or motorcycles, dive, water ski, scuba dive, or parachute jump while you are pregnant. · Avoid getting very hot. Do not use saunas or hot tubs. Avoid staying out in the sun in hot weather for long periods. Take acetaminophen (Tylenol) to lower a high fever. · Do not take any over-the-counter or herbal medicines or supplements without talking to your doctor or pharmacist first. 
When should you call for help? Call 911 anytime you think you may need emergency care. For example, call if: 
? · You passed out (lost consciousness). ? · You have severe vaginal bleeding. ? · You have severe pain in your belly or pelvis. ? · You have had fluid gushing or leaking from your vagina and you know or think the umbilical cord is bulging into your vagina. If this happens, immediately get down on your knees so your rear end (buttocks) is higher than your head. This will decrease the pressure on the cord until help arrives. ?Call your doctor now or seek immediate medical care if: 
? · You have signs of preeclampsia, such as: 
¨ Sudden swelling of your face, hands, or feet. ¨ New vision problems (such as dimness or blurring). ¨ A severe headache. ? · You have any vaginal bleeding. ? · You have belly pain or cramping. ? · You have a fever. ? · You have had regular contractions (with or without pain) for an hour. This means that you have 8 or more within 1 hour or 4 or more in 20 minutes after you change your position and drink fluids. ? · You have a sudden release of fluid from your vagina. ? · You have low back pain or pelvic pressure that does not go away. ? · You notice that your baby has stopped moving or is moving much less than normal. ? Watch closely for changes in your health, and be sure to contact your doctor if you have any problems. Where can you learn more? Go to http://mary kay-yung.info/. Enter [id number] in the search box to learn more about \"Pregnancy Precautions: Care Instructions. \" Current as of: March 16, 2017 Content Version: 11.4 © 1568-7763 Ecast. Care instructions adapted under license by [employer] (which disclaims liability or warranty for this information). If you have questions about a medical condition or this instruction, always ask your healthcare professional. [address] any warranty or liability for your use of this information. Introducing [facility]! Dear Yuliet Pacheco: Thank you for requesting a Enventum account. Our records indicate that you already have an active Enventum account. You can access your account anytime at [email or web address] Did you know that you can access your hospital and ER discharge instructions at any time in Enventum? You can also review all of your test results from your hospital stay or ER visit. Additional Information If you have questions, please visit the Frequently Asked Questions section of the Enventum website at [email or web address] Remember, Enventum is NOT to be used for urgent needs. For medical emergencies, dial 911. Now available from your iPhone and Android! Introducing Karsten Ceballos As a [facility] patient, I wanted to make you aware of our electronic visit tool called Karsten Ceballos. [facility] 24/7 allows you to connect within minutes with a medical provider 24 hours a day, seven days a week via a mobile device or tablet or logging into a secure website from your computer. You can access [employer] from anywhere in the [place]. A virtual visit might be right for you when you have a simple condition and feel like you just dont want to get out of bed, or cant get away from work for an appointment, when your [facility] provider is not available (evenings, weekends or holidays), or when youre out of town and need minor care. Electronic visits cost only $49 and if the [employer] 24/7 provider determines a prescription is needed to treat your condition, one can be electronically transmitted to a nearby pharmacy*. Please take a moment to enroll today if you have not already done so. The enrollment process is free and takes just a few minutes. To enroll, please download the [employer] 24/7 lauro to your tablet or phone, or visit [email or web address] to enroll on your computer. And, as an [address] [place] [address] patient with a SCREEMO account, the results of your visits will be scanned into your electronic medical record and your primary care provider will be able to view the scanned results. We urge you to continue to see your regular [facility] provider for your ongoing medical care. And while your primary care provider may not be the one available when you seek a [employer] virtual visit, the peace of mind you get from getting a real diagnosis real time can be priceless. For more information on [employer], view our Frequently Asked Questions (FAQs) at [email or web address]. Sincerely, 
 
Jessica Iniguez MD 
Chief Medical Officer [employer] *:  certain medications cannot be prescribed via [employer] Providers Seen During Your Hospitalization Provider Specialty Primary office phone Alvarado Phillip MD Family Practice [phone] Your Primary Care Physician (PCP) Primary Care Physician Office Phone Office Fax Jeremiah Jacqueline [phone] You are allergic to the following Allergen Reactions Ceclor (Cefaclor) Hives Penicillin G Hives Recent Documentation OB Status Smoking Status Pregnant Former Smoker Emergency Contacts Name Discharge Info Relation Home Work Mobile Shelia Thomas  Parent [1] [phone] Shelia Thomas  Parent [1] [phone] Patient Belongings The following personal items are in your possession at time of discharge: 
                             
 
  
  
 Please provide this summary of care documentation to your next provider. Signatures-by signing, you are acknowledging that this After Visit Summary has been reviewed with you and you have received a copy. Patient Signature:  ____________________________________________________________ Date:  ____________________________________________________________  
  
Laura Barron Provider Signature:  ____________________________________________________________ Date:  ____________________________________________________________

## 2018-04-02 NOTE — PROGRESS NOTES
1911- this RN at the bedside as superuser for [facility] check off. Marti Ramos RN collected sample.

## 2018-04-02 NOTE — DISCHARGE INSTRUCTIONS
Weeks 30 to 32 of Your Pregnancy: Care Instructions  Your Care Instructions    You have made it to the final months of your pregnancy. By now, your baby is really starting to look like a baby, with hair and plump skin. As you enter the final weeks of pregnancy, the reality of having a baby may start to set in. This is the time to settle on a name, get your household in order, set up a safe nursery, and find quality  if needed. Doing these things in advance will allow you to focus on caring for and enjoying your new baby. You may also want to have a tour of your hospital's labor and delivery unit to get a better idea of what to expect while you are in the hospital.  During these last months, it is very important to take good care of yourself and pay attention to what your body needs. If your doctor says it is okay for you to work, don't push yourself too hard. Use the tips provided in this care sheet to ease heartburn and care for varicose veins. If you haven't already had the Tdap shot during this pregnancy, talk to your doctor about getting it. It will help protect your  against pertussis infection. Follow-up care is a key part of your treatment and safety. Be sure to make and go to all appointments, and call your doctor if you are having problems. It's also a good idea to know your test results and keep a list of the medicines you take. How can you care for yourself at home? Pay attention to your baby's movements  · You should feel your baby move several times every day. · Your baby now turns less, and kicks and jabs more. · Your baby sleeps 20 to 45 minutes at a time and is more active at certain times of day. · If your doctor wants you to count your baby's kicks:  ¨ Empty your bladder, and lie on your side or relax in a comfortable chair. ¨ Write down your start time. ¨ Pay attention only to your baby's movements. Count any movement except hiccups.   ¨ After you have counted 10 movements, write down your stop time. ¨ Write down how many minutes it took for your baby to move 10 times. ¨ If an hour goes by and you have not recorded 10 movements, have something to eat or drink and then count for another hour. If you do not record 10 movements in either hour, call your doctor. Ease heartburn  · Eat small, frequent meals. · Do not eat chocolate, peppermint, or very spicy foods. Avoid drinks with caffeine, such as coffee, tea, and sodas. · Avoid bending over or lying down after meals. · Talk a short walk after you eat. · If heartburn is a problem at night, do not eat for 2 hours before bedtime. · Take antacids like Mylanta, Maalox, Rolaids, or Tums. Do not take antacids that have sodium bicarbonate. Care for varicose veins  · Varicose veins are blood vessels that stretch out with the extra blood during pregnancy. Your legs may ache or throb. Most varicose veins will go away after the birth. · Avoid standing for long periods of time. Sit with your legs crossed at the ankles, not the knees. · Sit with your feet propped up. · Avoid tight clothing or stockings. Wear support hose. · Exercise regularly. Try walking for at least 30 minutes a day. Where can you learn more? Go to http://mary kay-yung.info/. Enter U430 in the search box to learn more about \"Weeks 30 to 32 of Your Pregnancy: Care Instructions. \"  Current as of: 2017  Content Version: 11.4  © 2232-9562 [employer]. Care instructions adapted under license by [employer] (which disclaims liability or warranty for this information). If you have questions about a medical condition or this instruction, always ask your healthcare professional. [place] [address] any warranty or liability for your use of this information.          Pregnancy Precautions: Care Instructions  Your Care Instructions    There is no sure way to prevent labor before your due date ( labor) or to prevent most other pregnancy problems. But there are things you can do to increase your chances of a healthy pregnancy. Go to your appointments, follow your doctor's advice, and take good care of yourself. Eat well, and exercise (if your doctor agrees). And make sure to drink plenty of water. Follow-up care is a key part of your treatment and safety. Be sure to make and go to all appointments, and call your doctor if you are having problems. It's also a good idea to know your test results and keep a list of the medicines you take. How can you care for yourself at home? · Make sure you go to your prenatal appointments. At each visit, your doctor will check your blood pressure. Your doctor will also check to see if you have protein in your urine. High blood pressure and protein in urine are signs of preeclampsia. This condition can be dangerous for you and your baby. · Drink plenty of fluids, enough so that your urine is light yellow or clear like water. Dehydration can cause contractions. If you have kidney, heart, or liver disease and have to limit fluids, talk with your doctor before you increase the amount of fluids you drink. · Tell your doctor right away if you notice any symptoms of an infection, such as:  ¨ Burning when you urinate. ¨ A foul-smelling discharge from your vagina. ¨ Vaginal itching. ¨ Unexplained fever. ¨ Unusual pain or soreness in your uterus or lower belly. · Eat a balanced diet. Include plenty of foods that are high in calcium and iron. ¨ Foods high in calcium include milk, cheese, yogurt, almonds, and broccoli. ¨ Foods high in iron include red meat, shellfish, poultry, eggs, beans, raisins, whole-grain bread, and leafy green vegetables. · Do not smoke. If you need help quitting, talk to your doctor about stop-smoking programs and medicines. These can increase your chances of quitting for good. · Do not drink alcohol or use illegal drugs.   · Follow your doctor's directions about activity. Your doctor will let you know how much, if any, exercise you can do. · Ask your doctor if you can have sex. If you are at risk for early labor, your doctor may ask you to not have sex. · Take care to prevent falls. During pregnancy, your joints are loose, and your balance is off. Sports such as bicycling, skiing, or in-line skating can increase your risk of falling. And don't ride horses or motorcycles, dive, water ski, scuba dive, or parachute jump while you are pregnant. · Avoid getting very hot. Do not use saunas or hot tubs. Avoid staying out in the sun in hot weather for long periods. Take acetaminophen (Tylenol) to lower a high fever. · Do not take any over-the-counter or herbal medicines or supplements without talking to your doctor or pharmacist first.  When should you call for help? Call 911 anytime you think you may need emergency care. For example, call if:  ? · You passed out (lost consciousness). ? · You have severe vaginal bleeding. ? · You have severe pain in your belly or pelvis. ? · You have had fluid gushing or leaking from your vagina and you know or think the umbilical cord is bulging into your vagina. If this happens, immediately get down on your knees so your rear end (buttocks) is higher than your head. This will decrease the pressure on the cord until help arrives. ?Call your doctor now or seek immediate medical care if:  ? · You have signs of preeclampsia, such as:  ¨ Sudden swelling of your face, hands, or feet. ¨ New vision problems (such as dimness or blurring). ¨ A severe headache. ? · You have any vaginal bleeding. ? · You have belly pain or cramping. ? · You have a fever. ? · You have had regular contractions (with or without pain) for an hour. This means that you have 8 or more within 1 hour or 4 or more in 20 minutes after you change your position and drink fluids. ? · You have a sudden release of fluid from your vagina.    ? · You have low back pain or pelvic pressure that does not go away. ? · You notice that your baby has stopped moving or is moving much less than normal.   ? Watch closely for changes in your health, and be sure to contact your doctor if you have any problems. Where can you learn more? Go to http://mary kay-yung.info/. Enter [id number] in the search box to learn more about \"Pregnancy Precautions: Care Instructions. \"  Current as of: March 16, 2017  Content Version: 11.4  © 1604-1958 [employer]. Care instructions adapted under license by [employer] (which disclaims liability or warranty for this information). If you have questions about a medical condition or this instruction, always ask your healthcare professional. [address] any warranty or liability for your use of this information.

## 2018-04-02 NOTE — IP AVS SNAPSHOT
[address] [address] 104 [id number] [facility] 
[phone] Patient: Valentine Paz MRN: [id number] SJB:[id number] A check shea indicates which time of day the medication should be taken. My Medications ASK your doctor about these medications Instructions Each Dose to Equal  
 Morning Noon Evening Bedtime  
 aspirin delayed-release 81 mg tablet Your last dose was: Your next dose is: Take 81 mg by mouth daily. Indications: hx pre-eclampcia 81 mg  
    
   
   
   
  
 cetirizine 10 mg tablet Commonly known as:  ZYRTEC Your last dose was: Your next dose is: Take 1 Tab by mouth daily. 10 mg PRENATAL VITAMIN PO Your last dose was: Your next dose is: Take  by mouth. raNITIdine 75 mg tablet Commonly known as:  ZANTAC Your last dose was: Your next dose is: Take 1 Tab by mouth two (2) times a day.   
 75 mg

## 2018-04-02 NOTE — DISCHARGE SUMMARY
Antepartum Discharge Summary     Name: Troy Acosta MRN: [id number]  SSN: [id number]    YOB: 1980  Age: 40 y.o. Sex: female      Admit Date: 2018    Discharge Date: 2018     Admitting Physician: Luis Fernandez MD     Attending Physician:  Anupama Ewing MD     Admission Diagnoses: There are no admission diagnoses documented for this encounter. Discharge Diagnoses:   Problem List as of 2018  Date Reviewed: 2017          Codes Class Noted - Resolved    Tobacco abuse ICD-10-CM: Z72.0  ICD-9-CM: 305.1  10/19/2017 - Present        Gastroesophageal reflux disease without esophagitis ICD-10-CM: K21.9  ICD-9-CM: 530.81  10/19/2017 - Present        Elderly multigravida in first trimester ICD-10-CM: O09.521  ICD-9-CM: 659.63  10/19/2017 - Present        History of pre-eclampsia ICD-10-CM: Z87.59  ICD-9-CM: V13.29  10/19/2017 - Present    Overview Signed 10/19/2017  2:39 PM by Deanna Diego MD     In prior pregnancy              RESOLVED: Mourning ICD-10-CM: F43.20  ICD-9-CM: 309.0  2012 - 2016             Lab Results   Component Value Date/Time    Rubella, External 2.15, immune 10/26/2017       Immunization(s):   Immunization History   Administered Date(s) Administered    Influenza Vaccine 10/03/2017    MMR Vaccine 2011    Tdap 2010, 2018        Hospital Course:    Ms. Saniya Smith is a 40 y.o.   female with an estimated gestational age of 32w2d by LMP who presented for pregnancy and rule out of PROM. FHT was reassuring with baseline HR of 145 bmp, moderate variability and accelerations. Her abd pain resolved and she did not have any loss of fluids during the hospital course. No speculum exam was performed. She did not have vaginal bleeding, contractions or uterine activity. Her POC Amnisure was negative. - Advised to f/u with Dr. Monica Aguilar in 1 week.    Condition at Discharge: Stable    Patient Instructions:   Current Discharge Medication List      CONTINUE these medications which have NOT CHANGED    Details   aspirin delayed-release 81 mg tablet Take 81 mg by mouth daily. Indications: hx pre-eclampcia      cetirizine (ZYRTEC) 10 mg tablet Take 1 Tab by mouth daily. Qty: 90 Tab, Refills: 1    Associated Diagnoses: Chronic seasonal allergic rhinitis due to pollen      PRENATAL VIT CALC,IRON,FOLIC (PRENATAL VITAMIN PO) Take  by mouth. Associated Diagnoses: Less than 8 weeks gestation of pregnancy      raNITIdine (ZANTAC) 75 mg tablet Take 1 Tab by mouth two (2) times a day. Qty: 30 Tab, Refills: 3    Associated Diagnoses: Gastroesophageal reflux disease without esophagitis             Reference my discharge instructions. No orders of the defined types were placed in this encounter.        Signed By:  Bernadette Pyle MD    Family Medicine Resident

## 2018-04-02 NOTE — PROGRESS NOTES
From ED via w/c, , hx c/s x1 at 30 week 3days. Was at work, felt stomach pain as if needed bowel movement. Went to bathroom and did have a bowel movement, then urinated, then after urinating felt a gush, and not sure what it was. Pain on left side of abdomen and points to left side of c/s scar. 1814: efm applied, sitting up in bed, mid fowlers. No fluid noted at this time. 1830: call to dr Deniz Parish, phone report, rbto to page resident. Resident paged.     1840: dr Benita Hinson in to see patient

## 2018-04-02 NOTE — H&P
History & Physical    Name: Rafita Rodriguez MRN: [id number]  SSN: [id number]    YOB: 1980  Age: 40 y.o. Sex: female      Subjective:     Reason for Admission:  Pregnancy and evaluation for PROM    History of Present Illness: Ms. Abundio Cisneros is a 40 y.o.   female with an estimated gestational age of 32w2d by LMP (consistent with first trimester ultrasound) with Estimated Date of Delivery: 18. Patient states that she was at work and felt pain in LLQ of her abdomen. She attributed the pain to constipation. Reports that she had a bowel movement and urinated. After she was finished urinating, she felt a gush of fluid (unable to determine if she just had to urinate more). Reports cramping pain in LLQ of her abdomen after gush of fluid. States that pain has resolved and that she feels the baby move. Denies trauma, HA, N/V vaginal bleeding, contractions, dysuria and sexual intercourse in the last week. OB History    Para Term  AB Living   4 1 1 0 2 1   SAB TAB Ectopic Molar Multiple Live Births   1 0 1  0 1      # Outcome Date GA Lbr Hollis/2nd Weight Sex Delivery Anes PTL Lv   4 Current            3 Term  38w0d  3.221 kg M CS-LTranv   MURIEL      Complications: Failure to Progress in Second Stage   2 SAB            1 Ectopic                 Past Medical History:   Diagnosis Date    Essential hypertension     10 years ago with first pregnancy     Past Surgical History:   Procedure Laterality Date     DELIVERY ONLY       Social History     Occupational History    Not on file.      Social History Main Topics    Smoking status: Former Smoker     Packs/day: 0.25     Years: 10.00     Types: Cigarettes     Quit date: 2015    Smokeless tobacco: Never Used    Alcohol use No    Drug use: No    Sexual activity: Yes     Partners: Male     Birth control/ protection: Condom      Family History   Problem Relation Age of Onset    Breast Cancer Mother     Heart Disease Mother     Cancer Father        Allergies   Allergen Reactions    Ceclor [Cefaclor] Hives    Penicillin G Hives     Prior to Admission medications    Medication Sig Start Date End Date Taking? Authorizing Provider   aspirin delayed-release 81 mg tablet Take 81 mg by mouth daily. Indications: hx pre-eclampcia   Yes Historical Provider   cetirizine (ZYRTEC) 10 mg tablet Take 1 Tab by mouth daily. 2/15/18  Yes Margaret Bain MD   PRENATAL VIT CALC,IRON,FOLIC (PRENATAL VITAMIN PO) Take  by mouth. Yes Historical Provider   raNITIdine (ZANTAC) 75 mg tablet Take 1 Tab by mouth two (2) times a day. 10/17/17  Yes Bell Zuniga MD        Review of Systems:  A comprehensive review of systems was negative except for that written in the History of Present Illness. Objective:     Vitals:    Vitals:    18 1818   BP: 131/82   Pulse: 81   Resp: 17   Temp: 98.4 °F (36.9 °C)      Temp (24hrs), Av.4 °F (36.9 °C), Min:98.4 °F (36.9 °C), Max:98.4 °F (36.9 °C)    BP  Min: 131/82  Max: 131/82     Physical Exam:  Patient without distress. Heart: Regular rate and rhythm or S1S2 present  Lung: clear to auscultation throughout lung fields, no wheezes, no rales, no rhonchi and normal respiratory effort  Back: costovertebral angle tenderness absent  Abdomen: soft, nontender  Lower Extremities:  - Edema No     Membranes: unclear if ruptured or intact  Uterine Activity:  None  Fetal Heart Rate:  Reactive  Baseline: 145 per minute  Variability: moderate  Accelerations: yes       Lab/Data Review:  No results found for this or any previous visit (from the past 24 hour(s)). Assessment and Plan:   Ms. Mehrdad Hatch is a 40 y.o.   female with an estimated gestational age of 32w2d by LMP (consistent with first trimester ultrasound) who presented for rule out of PROM. Abd pain has resolved, no vaginal bleeding, no contractions, no recent trauma are all reassuring. FHT is also reassuring. No uterine activity.  Likely that the fluid was incomplete bladder emptying. Prenatal Labs : O+, HIV nonreactive,RPR non reactive, HB sAg nonreactive, Rubella immune, VZV immune. 1. Continuous FHT  2.  POC Amnisure ROM test to rule out PROM    Patient seen and discussed with Dr. Crissy Kapadia MD  Family Medicine Resident

## 2018-04-12 ENCOUNTER — ROUTINE PRENATAL (OUTPATIENT)
Dept: FAMILY MEDICINE CLINIC | Age: 38
End: 2018-04-12

## 2018-04-12 VITALS
TEMPERATURE: 97.2 F | DIASTOLIC BLOOD PRESSURE: 73 MMHG | RESPIRATION RATE: 20 BRPM | HEIGHT: 65 IN | HEART RATE: 89 BPM | OXYGEN SATURATION: 97 % | SYSTOLIC BLOOD PRESSURE: 122 MMHG | BODY MASS INDEX: 37.65 KG/M2 | WEIGHT: 226 LBS

## 2018-04-12 DIAGNOSIS — Z3A.32 32 WEEKS GESTATION OF PREGNANCY: Primary | ICD-10-CM

## 2018-04-12 DIAGNOSIS — Z98.891 HISTORY OF C-SECTION: ICD-10-CM

## 2018-04-12 DIAGNOSIS — Z87.59 HISTORY OF PRE-ECLAMPSIA: ICD-10-CM

## 2018-04-12 DIAGNOSIS — K21.9 GASTROESOPHAGEAL REFLUX DISEASE WITHOUT ESOPHAGITIS: ICD-10-CM

## 2018-04-12 DIAGNOSIS — E66.9 OBESITY (BMI 30-39.9): ICD-10-CM

## 2018-04-12 DIAGNOSIS — Z72.0 TOBACCO ABUSE: ICD-10-CM

## 2018-04-12 DIAGNOSIS — O09.521 ELDERLY MULTIGRAVIDA IN FIRST TRIMESTER: ICD-10-CM

## 2018-04-12 LAB
BILIRUB UR QL STRIP: NEGATIVE
GLUCOSE UR-MCNC: NEGATIVE MG/DL
KETONES P FAST UR STRIP-MCNC: NEGATIVE MG/DL
PH UR STRIP: 7 [PH] (ref 4.6–8)
PROT UR QL STRIP: NEGATIVE
SP GR UR STRIP: 1.01 (ref 1–1.03)
UA UROBILINOGEN AMB POC: NORMAL (ref 0.2–1)
URINALYSIS CLARITY POC: CLEAR
URINALYSIS COLOR POC: YELLOW
URINE BLOOD POC: NEGATIVE
URINE LEUKOCYTES POC: NORMAL
URINE NITRITES POC: NEGATIVE

## 2018-04-12 NOTE — PROGRESS NOTES
There are no preventive care reminders to display for this patient. Body mass index is 37.61 kg/(m^2). 1. Have you been to the ER, urgent care clinic since your last visit? Hospitalized since your last visit? No    2. Have you seen or consulted any other health care providers outside of the [address] [place] [address] since your last visit? Include any pap smears or colon screening.  No  Reviewed record in preparation for visit and have necessary documentation  Pt did not bring medication to office visit for review  Information was given to pt on Advanced Directives, Living Will  Information was given on Shingles Vaccine  opportunity was given for questions  Goals that were addressed and/or need to be completed during or after this appointment include

## 2018-04-12 NOTE — PROGRESS NOTES
Return OB Visit       Subjective:   Scarlet Michelle 40 y.o. Maryam Singleton   CEDRIC: 6/8/2018, by Last Menstrual Period  GA:  31w6d. No complaints at this time. Diet: well balanced, healthy   Water intake: adequate   Prenatal Vitamins: taking     She is feeling her baby move. She denies vaginal bleeding, discharge or loss of fluid. She denies nausea, vomiting, severe abdominal pain or cramping. She denies dysuria. She denies headaches, dizziness or vision changes. She denies excessive swelling of extremities. Past Medical History - Reviewed today  Patient Active Problem List   Diagnosis Code    Tobacco abuse Z72.0    Gastroesophageal reflux disease without esophagitis K21.9    Elderly multigravida in first trimester O09.521    History of pre-eclampsia Z87.59         Medications - Reviewed today  Current Outpatient Prescriptions   Medication Sig Dispense Refill    aspirin delayed-release 81 mg tablet Take 81 mg by mouth daily. Indications: hx pre-eclampcia      cetirizine (ZYRTEC) 10 mg tablet Take 1 Tab by mouth daily. 90 Tab 1    PRENATAL VIT CALC,IRON,FOLIC (PRENATAL VITAMIN PO) Take  by mouth.  raNITIdine (ZANTAC) 75 mg tablet Take 1 Tab by mouth two (2) times a day. 30 Tab 3         Allergies - Reviewed today  Allergies   Allergen Reactions    Ceclor [Cefaclor] Hives    Penicillin G Hives         Family History - Reviewed today  Family History   Problem Relation Age of Onset    Breast Cancer Mother     Heart Disease Mother     Cancer Father          Social History - Reviewed today  Social History     Social History    Marital status:      Spouse name: N/A    Number of children: N/A    Years of education: N/A     Occupational History    Not on file.      Social History Main Topics    Smoking status: Former Smoker     Packs/day: 0.25     Years: 10.00     Types: Cigarettes     Quit date: 6/16/2015    Smokeless tobacco: Never Used    Alcohol use No    Drug use: No    Sexual activity: Yes     Partners: Male     Birth control/ protection: Condom     Other Topics Concern    Not on file     Social History Narrative         Health Maintenance - Reviewed today   Immunizations:           -Influenza: given on 10/3/2017     -Tdap: Will give today        Screening:     -Pap smear: 2016: ASCUS , will repap in 3 years , 2019      Objective:     Visit Vitals    /73    Pulse 89    Temp 97.2 °F (36.2 °C) (Oral)    Resp 20    Ht 5' 5\" (1.651 m)    Wt 226 lb (102.5 kg)    LMP 2017 (Approximate)    SpO2 97%    BMI 37.61 kg/m2       Physical Exam:  GENERAL APPEARANCE: alert, well appearing, in no apparent distress  LUNGS: clear to auscultation, no wheezes, rales or rhonchi, symmetric air entry  HEART: regular rate and rhythm, no murmurs  ABDOMEN: soft, nontender, nondistended, no abnormal masses, no epigastric pain, fundus soft, nontender 35 cm and FHT present, 140 bpm  BACK: no CVA tenderness  UTERUS: gravid  EXTREMITIES: no redness or tenderness in the calves or thighs, no edema  NEUROLOGICAL: alert, oriented, normal speech, no focal findings or movement disorder noted  PELVIC: examination not indicated. Assessment    Pt is a 39 yo  with SIUP at  31w6d  dating c/w LMP and 12w3d ultrasound.     Prenatal Labs : O+, HIV nonreactive,RPR non reactive, HB sAg nonreactive, Rubella immune, VZV immune. BDHV5oy wnl. .           ICD-10-CM ICD-9-CM    1. 32 weeks gestation of pregnancy Z3A.32 V22.2 AMB POC URINALYSIS DIP STICK AUTO W/O MICRO         Plan   SIUP 31w 6d     - U/A showed trace leuks, negative gluc,negative carrol, negative ketone  - Will refer pt for a growth scan 32 weeks  - First trimester ultrasound:CRL consistent with LMP.  Good cardiac activity noted    - 2nd trimester screen for Down's Syndrome discussed: NT normal, CRL consistent with dates   - Expecting baby boy  - Anticipate c/s,   - Follow up in 2 weeks      Hx of LTCS 2/2  failed induction at 45 weeks: ( 10 years ago)   - Desires repeat C/S  - Continue to offer TOLAC as an option  - Will schedule for   At 38w0d for LTCS at [facility]      Hx of PreE:   CBC: WBC: 12.9, , Hemoglobin 11.1  CMP: Cr.0.46,  LDH :157wnl,Uric Acid: 4.1, urine cr/pr: wnl  Blood pressure wnl   - ASA, started at 12 weeks      GERD   Currently  asymptomatic,symptoms have resolved with current regimen   - Pt was advised pt of lifestyle modifications , elevate the head of your bed, do not recline 30 mins after a meal.   - Ranitidine 75mg bid           AMA  Second trimester ultrasound : normal fetal and maternal anatomy,normal fetal movements, normal fluid. Negative/low-risk fetal cell-free DNA screening for fetal Trisomy 21/18/13.   - Advised pt to exercise daily, low fat, low salt,low sugar diet   - Will schedule follow up with MFM at 34 weeks               Hx of Tobacco Abuse:  2.5 ppy, now 1-2 cigs a  week    -Counseled on the effects of smoking to including spontaneous pregnancy loss, placental abruption,  premature rupture of membranes, placenta previa,  labor and delivery, low birth weight.    - The patient was counseled on the dangers of tobacco use, and was advised to quit. Feliz Small strategies to maximize success, including removing cigarettes and smoking materials from environment, stress management and support of family/friends.          Hx of Obesity   Pre-pregnancy weight : 171,  Current today: 226lb  - I have reviewed/discussed the above normal BMI with the patient.   I have recommended the following interventions: dietary management education, guidance, and counseling, encourage exercise, monitor weight and prescribed dietary intake .           Orders Placed This Encounter    AMB POC URINALYSIS DIP STICK AUTO W/O MICRO         Labor precautions discussed, including: Regular painful contractions, lasting for greater than one hour, taking your breath away; any vaginal bleeding; any leakage of fluid; or absent or decreased fetal movement. Call M.D. on call if any of these symptoms or signs occur. I have discussed the diagnosis with the patient and the intended plan as seen in the above orders. The patient has received an after-visit summary and questions were answered concerning future plans. I have discussed medication side effects and warnings with the patient as well.     Patient discussed with Dr. Lia Ramsey MD  Family Medicine Resident

## 2018-04-20 ENCOUNTER — TELEPHONE (OUTPATIENT)
Dept: FAMILY MEDICINE CLINIC | Age: 38
End: 2018-04-20

## 2018-04-20 NOTE — TELEPHONE ENCOUNTER
Zabrina Eastman from the [facility]/804 [phone] called requesting an updated order if the Pt is to return to them again from Dr. Sadaf Lerner. She said Fiordaliza Blanchard told her she would have to come again.  Thanks

## 2018-04-21 NOTE — PROGRESS NOTES
I discussed the findings, assessment and plan in detail with the resident and agree with the resident's findings and plan as documented in the resident's note. Marco Guajardo M.D.

## 2018-04-25 NOTE — TELEPHONE ENCOUNTER
[facility] called inquiring about new order for pt. Pt has an appt. Tomorrow morning 4/26 and needs new order before she can be seen.      Fax number: [phone]

## 2018-04-26 ENCOUNTER — HOSPITAL ENCOUNTER (OUTPATIENT)
Dept: PERINATAL CARE | Age: 38
Discharge: HOME | End: 2018-04-26
Attending: OBSTETRICS & GYNECOLOGY
Payer: COMMERCIAL

## 2018-04-26 PROCEDURE — 76816 OB US FOLLOW-UP PER FETUS: CPT | Performed by: OBSTETRICS & GYNECOLOGY

## 2018-04-27 ENCOUNTER — ROUTINE PRENATAL (OUTPATIENT)
Dept: FAMILY MEDICINE CLINIC | Age: 38
End: 2018-04-27

## 2018-04-27 VITALS
RESPIRATION RATE: 20 BRPM | OXYGEN SATURATION: 98 % | SYSTOLIC BLOOD PRESSURE: 126 MMHG | HEIGHT: 65 IN | WEIGHT: 227 LBS | TEMPERATURE: 97.8 F | HEART RATE: 90 BPM | BODY MASS INDEX: 37.82 KG/M2 | DIASTOLIC BLOOD PRESSURE: 72 MMHG

## 2018-04-27 DIAGNOSIS — Z72.0 TOBACCO ABUSE: ICD-10-CM

## 2018-04-27 DIAGNOSIS — O09.523 ELDERLY MULTIGRAVIDA IN THIRD TRIMESTER: Primary | ICD-10-CM

## 2018-04-27 DIAGNOSIS — Z3A.34 34 WEEKS GESTATION OF PREGNANCY: ICD-10-CM

## 2018-04-27 DIAGNOSIS — K21.9 GASTROESOPHAGEAL REFLUX DISEASE WITHOUT ESOPHAGITIS: ICD-10-CM

## 2018-04-27 DIAGNOSIS — O09.292 HX OF PRE-ECLAMPSIA IN PRIOR PREGNANCY, CURRENTLY PREGNANT, SECOND TRIMESTER: ICD-10-CM

## 2018-04-27 DIAGNOSIS — O09.523 ENCOUNTER FOR SUPERVISION OF MULTIGRAVIDA OF ADVANCED MATERNAL AGE IN THIRD TRIMESTER: Primary | ICD-10-CM

## 2018-04-27 DIAGNOSIS — Z87.59 HISTORY OF PRE-ECLAMPSIA: ICD-10-CM

## 2018-04-27 LAB
BILIRUB UR QL STRIP: NEGATIVE
GLUCOSE UR-MCNC: NEGATIVE MG/DL
KETONES P FAST UR STRIP-MCNC: NEGATIVE MG/DL
PH UR STRIP: 6.5 [PH] (ref 4.6–8)
PROT UR QL STRIP: NORMAL
SP GR UR STRIP: 1.02 (ref 1–1.03)
UA UROBILINOGEN AMB POC: NORMAL (ref 0.2–1)
URINALYSIS CLARITY POC: CLEAR
URINALYSIS COLOR POC: YELLOW
URINE BLOOD POC: NEGATIVE
URINE LEUKOCYTES POC: NORMAL
URINE NITRITES POC: NEGATIVE

## 2018-04-27 NOTE — PROGRESS NOTES
Pt is a 41 y.o.  at 34w0d by LMP c/w 1st trimester US for routine prenatal care.     Rh: Positive  GBS: N/A  Vaginal bleeding: NO  LOF: NO  Fetal movement: Normal  FHT's: 140's  Delivery plan: Scheduled LTCS at 39 weeks for h/o C/S

## 2018-04-27 NOTE — PROGRESS NOTES
Return OB Visit       Subjective:   Tati Allen 41 y.o. Dora Soriano   CEDRIC: 6/8/2018, by Last Menstrual Period  GA:  34w0d. No complaints at this time. Diet: Well balanced,bi  Water intake: subadequate , drinking a lot of tea   Prenatal Vitamins: taking     She is feeling her baby move. She denies vaginal bleeding, discharge or loss of fluid. She denies nausea, vomiting, severe abdominal pain or cramping. She denies dysuria. She denies headaches, dizziness or vision changes. She denies excessive swelling of extremities. Past Medical History - Reviewed today  Patient Active Problem List   Diagnosis Code    Tobacco abuse Z72.0    Gastroesophageal reflux disease without esophagitis K21.9    Elderly multigravida in first trimester O09.521    History of pre-eclampsia Z87.59         Medications - Reviewed today  Current Outpatient Prescriptions   Medication Sig Dispense Refill    aspirin delayed-release 81 mg tablet Take 81 mg by mouth daily. Indications: hx pre-eclampcia      cetirizine (ZYRTEC) 10 mg tablet Take 1 Tab by mouth daily. 90 Tab 1    PRENATAL VIT CALC,IRON,FOLIC (PRENATAL VITAMIN PO) Take  by mouth.  raNITIdine (ZANTAC) 75 mg tablet Take 1 Tab by mouth two (2) times a day. 30 Tab 3         Allergies - Reviewed today  Allergies   Allergen Reactions    Ceclor [Cefaclor] Hives    Penicillin G Hives         Family History - Reviewed today  Family History   Problem Relation Age of Onset    Breast Cancer Mother     Heart Disease Mother     Cancer Father          Social History - Reviewed today  Social History     Social History    Marital status:      Spouse name: N/A    Number of children: N/A    Years of education: N/A     Occupational History    Not on file.      Social History Main Topics    Smoking status: Former Smoker     Packs/day: 0.25     Years: 10.00     Types: Cigarettes     Quit date: 6/16/2015    Smokeless tobacco: Never Used    Alcohol use No    Drug use: No    Sexual activity: Yes     Partners: Male     Birth control/ protection: Condom     Other Topics Concern    Not on file     Social History Narrative           Health Maintenance - Reviewed today  Immunizations:           -Influenza: given on 10/3/2017     -Tdap: Will give today        Screening:     -Pap smear: 2016: ASCUS , will repap in 3 years , 2019      Objective:     Visit Vitals    /72    Pulse 90    Temp 97.8 °F (36.6 °C) (Oral)    Resp 20    Ht 5' 5\" (1.651 m)    Wt 227 lb (103 kg)    LMP 2017 (Approximate)    SpO2 98%    BMI 37.77 kg/m2       Physical Exam:  GENERAL APPEARANCE: alert, well appearing, in no apparent distress  LUNGS: clear to auscultation, no wheezes, rales or rhonchi, symmetric air entry  HEART: regular rate and rhythm, no murmurs  ABDOMEN: soft, nontender, nondistended, no abnormal masses, no epigastric pain, fundus soft, nontender 35 cm and FHT present, 140s bpm  BACK: no CVA tenderness  UTERUS: gravid  EXTREMITIES: no redness or tenderness in the calves or thighs, no edema  NEUROLOGICAL: alert, oriented, normal speech, no focal findings or movement disorder noted  PELVIC: examination not indicated. Assessment     Pt is a 39 yo  with SIUP at  34w0d  dating by  LMP c/w 12w3d ultrasound.     Prenatal Labs : O+, HIV nonreactive,RPR non reactive, HB sAg nonreactive, Rubella immune, VZV immune. ONIQ2tu wnl. Patrick Rodney        ICD-10-CM ICD-9-CM    1. Encounter for supervision of multigravida of advanced maternal age in third trimester O36.200 V23.82    2. 34 weeks gestation of pregnancy Z3A.34 V22.2 AMB POC URINALYSIS DIP STICK AUTO W/O MICRO   3. History of pre-eclampsia Z87.59 V13.29    4. Gastroesophageal reflux disease without esophagitis K21.9 530.81    5.  Tobacco abuse Z72.0 305.1          Plan   SIUP 34w 0d   - U/A showed trace leuks, trace protein, negative gluc,negative carrol, negative ketone  - Will refer pt for a growth scan 32 weeks  - First trimester ultrasound:CRL consistent with LMP. Good cardiac activity noted    - 2nd trimester screen for Down's Syndrome discussed: NT normal, CRL consistent with dates   - Expecting baby boy  - Desires Circ  - Plans to breast feed   - Anticipate c/s will schedule   - At 35 weeks will get CBC,GBS   - Growth scan:  wnl, vertix, posterior placenta , BEN wnl  - Follow up in 2 weeks        Hx of LTCS 2/2  failed induction at 45 weeks: (10 years ago)   - Desires repeat C/S  - Continue to offer TOLAC as an option  - Will schedule for   At 38w0d for LT[facility] at [facility]      Hx of PreE:   CBC: WBC: 12.9, , Hemoglobin 11.1  CMP: Cr.0.46,  LDH :157wnl,Uric Acid: 4.1, urine cr/pr: wnl  Blood pressure wnl   - ASA, started at 12 weeks      GERD   Currently  asymptomatic,symptoms have resolved with current regimen   - Pt was advised pt of lifestyle modifications , elevate the head of your bed, do not recline 30 mins after a meal.   - Ranitidine 75mg bid           AMA  Second trimester ultrasound : normal fetal and maternal anatomy,normal fetal movements, normal fluid.  Negative/low-risk fetal cell-free DNA screening for fetal Trisomy 21/18/13.   - Advised pt to exercise daily, low fat, low salt,low sugar diet   - Will schedule follow up with MFM at 34 weeks               Hx of Tobacco Abuse:  2.5 ppy, now 1  cigs a night     -Counseled on the effects of smoking to including spontaneous pregnancy loss, placental abruption,  premature rupture of membranes, placenta previa,  labor and delivery, low birth weight.    - The patient was counseled on the dangers of tobacco use, and was advised to quit. Noa Degroot strategies to maximize success, including removing cigarettes and smoking materials from environment, stress management and support of family/friends.          Hx of Obesity   Pre-pregnancy weight : 171,  Current today: 227lb  - I have reviewed/discussed the above normal BMI with the patient.  I have recommended the following interventions: dietary management education, guidance, and counseling, encourage exercise, monitor weight and prescribed dietary intake .        ·      Orders Placed This Encounter    AMB POC URINALYSIS DIP STICK AUTO W/O MICRO         Labor precautions discussed, including: Regular painful contractions, lasting for greater than one hour, taking your breath away; any vaginal bleeding; any leakage of fluid; or absent or decreased fetal movement. Call M.D. on call if any of these symptoms or signs occur. I have discussed the diagnosis with the patient and the intended plan as seen in the above orders. The patient has received an after-visit summary and questions were answered concerning future plans. I have discussed medication side effects and warnings with the patient as well.     Patient discussed with Dr. Mitesh Martin MD  Family Medicine Resident,PGY -2

## 2018-04-27 NOTE — PROGRESS NOTES
There are no preventive care reminders to display for this patient. There is no height or weight on file to calculate BMI. 1. Have you been to the ER, urgent care clinic since your last visit? Hospitalized since your last visit? No    2. Have you seen or consulted any other health care providers outside of the [address] [place] [address] since your last visit? Include any pap smears or colon screening.  No  Reviewed record in preparation for visit and have necessary documentation  Pt did not bring medication to office visit for review  Information was given to pt on Advanced Directives, Living Will  Information was given on Shingles Vaccine  opportunity was given for questions  Goals that were addressed and/or need to be completed during or after this appointment include

## 2018-04-30 PROBLEM — O09.292 HX OF PRE-ECLAMPSIA IN PRIOR PREGNANCY, CURRENTLY PREGNANT, SECOND TRIMESTER: Status: ACTIVE | Noted: 2018-04-30

## 2018-04-30 NOTE — PROGRESS NOTES
Return OB Visit       Subjective:   Tati Allen 41 y.o. Dora Soriano   CEDRIC: 6/8/2018, by Last Menstrual Period  GA:  34w3d. No complaints at this time. Smoking one cigarette a night. Diet: well balanced, healthy   Water intake: adequate   Prenatal Vitamins: taking     She is feeling her baby move. She denies vaginal bleeding, discharge or loss of fluid. She denies nausea, vomiting, severe abdominal pain or cramping. She denies dysuria. She denies headaches, dizziness or vision changes. She denies excessive swelling of extremities. Past Medical History - Reviewed today  Patient Active Problem List   Diagnosis Code    Tobacco abuse Z72.0    Gastroesophageal reflux disease without esophagitis K21.9    Elderly multigravida in first trimester O09.521    History of pre-eclampsia Z87.59    Hx of pre-eclampsia in prior pregnancy, currently pregnant, second trimester O09.292         Medications - Reviewed today  Current Outpatient Prescriptions   Medication Sig Dispense Refill    aspirin delayed-release 81 mg tablet Take 81 mg by mouth daily. Indications: hx pre-eclampcia      cetirizine (ZYRTEC) 10 mg tablet Take 1 Tab by mouth daily. 90 Tab 1    PRENATAL VIT CALC,IRON,FOLIC (PRENATAL VITAMIN PO) Take  by mouth.  raNITIdine (ZANTAC) 75 mg tablet Take 1 Tab by mouth two (2) times a day. 30 Tab 3         Allergies - Reviewed today  Allergies   Allergen Reactions    Ceclor [Cefaclor] Hives    Penicillin G Hives         Family History - Reviewed today  Family History   Problem Relation Age of Onset    Breast Cancer Mother     Heart Disease Mother     Cancer Father          Social History - Reviewed today  Social History     Social History    Marital status:      Spouse name: N/A    Number of children: N/A    Years of education: N/A     Occupational History    Not on file.      Social History Main Topics    Smoking status: Former Smoker     Packs/day: 0.25     Years: 10.00 Types: Cigarettes     Quit date: 2015    Smokeless tobacco: Never Used    Alcohol use No    Drug use: No    Sexual activity: Yes     Partners: Male     Birth control/ protection: Condom     Other Topics Concern    Not on file     Social History Narrative         Health Maintenance - Reviewed today  Immunizations:         -Influenza: given on 10/3/2017     -Tdap: Will give today        Screening:     -Pap smear: 2016: ASCUS , will repap in 3 years , 2019      Objective:     Visit Vitals    LMP 2017 (Approximate)         Physical Exam:  GENERAL APPEARANCE: alert, well appearing, in no apparent distress  LUNGS: clear to auscultation, no wheezes, rales or rhonchi, symmetric air entry  HEART: regular rate and rhythm, no murmurs  ABDOMEN: soft, nontender, nondistended, no abnormal masses, no epigastric pain, fundus soft, nontender 35 cm and FHT present, 140s bpm  BACK: no CVA tenderness  UTERUS: gravid  EXTREMITIES: no redness or tenderness in the calves or thighs, no edema  NEUROLOGICAL: alert, oriented, normal speech, no focal findings or movement disorder noted  PELVIC: examination not indicated. Assessment     Pt is a 39 yo  with SIUP at  34w0d  dating by  LMP c/w 12w3d ultrasound.     Prenatal Labs : O+, HIV nonreactive,RPR non reactive, HB sAg nonreactive, Rubella immune, VZV immune. [id number] wnl. .      ICD-10-CM ICD-9-CM    1. Elderly multigravida in third trimester O09.523 659.63    2. Gastroesophageal reflux disease without esophagitis K21.9 530.81    3. Tobacco abuse Z72.0 305.1    4. Hx of pre-eclampsia in prior pregnancy, currently pregnant, second trimester O09.292 V23.49          Plan   SIUP 34w 0d   - U/A showed trace leuks, trace protein, negative gluc,negative carrol, negative ketone  - Will refer pt for a growth scan 32 weeks  - First trimester ultrasound:CRL consistent with LMP.  Good cardiac activity noted    - 2nd trimester screen for Down's Syndrome discussed: NT normal, CRL consistent with dates   - Expecting baby boy  - Desires Circ  - Plans to breast feed   - Anticipate c/s will schedule   - At 35 weeks will get CBC,GBS   - Growth scan:  wnl, vertix, posterior placenta , BEN wnl  - Follow up in 2 weeks          Hx of LTCS 2/2  failed induction at 45 weeks: (10 years ago)   - Desires repeat C/S  - Continue to offer TOLAC as an option  - Will schedule for   At 38w0d for LTCS at [facility]      Hx of PreE:   CBC: WBC: 12.9, , Hemoglobin 11.1  CMP: Cr.0.46,  LDH :157wnl,Uric Acid: 4.1, urine cr/pr: wnl  Blood pressure wnl   - ASA, started at 12 weeks      GERD   Currently  asymptomatic,symptoms have resolved with current regimen   - Pt was advised pt of lifestyle modifications , elevate the head of your bed, do not recline 30 mins after a meal.   - Ranitidine 75mg bid           AMA  Second trimester ultrasound : normal fetal and maternal anatomy,normal fetal movements, normal fluid.  Negative/low-risk fetal cell-free DNA screening for fetal Trisomy 21/18/13.   - Advised pt to exercise daily, low fat, low salt,low sugar diet   - Will schedule follow up with MFM at 34 weeks               Hx of Tobacco Abuse:  2.5 ppy, now 1  cigs a night     -Counseled on the effects of smoking to including spontaneous pregnancy loss, placental abruption,  premature rupture of membranes, placenta previa,  labor and delivery, low birth weight.    - The patient was counseled on the dangers of tobacco use, and was advised to quit. Nickolas Sandhoff strategies to maximize success, including removing cigarettes and smoking materials from environment, stress management and support of family/friends.          Hx of Obesity   Pre-pregnancy weight : 171,  Current today: 227lb  - I have reviewed/discussed the above normal BMI with the patient.  I have recommended the following interventions: dietary management education, guidance, and counseling, encourage exercise, monitor weight and prescribed dietary intake .        ·            Orders Placed This Encounter    AMB POC URINALYSIS DIP STICK AUTO W/O MICRO            Labor precautions discussed, including: Regular painful contractions, lasting for greater than one hour, taking your breath away; any vaginal bleeding; any leakage of fluid; or absent or decreased fetal movement. Call M.D. on call if any of these symptoms or signs occur.     I have discussed the diagnosis with the patient and the intended plan as seen in the above orders. The patient has received an after-visit summary and questions were answered concerning future plans.   I have discussed medication side effects and warnings with the patient as well.     Patient discussed with Dr. Katie De Luna, [address] [facility] -2

## 2018-05-01 ENCOUNTER — ROUTINE PRENATAL (OUTPATIENT)
Dept: FAMILY MEDICINE CLINIC | Age: 38
End: 2018-05-01

## 2018-05-01 VITALS
OXYGEN SATURATION: 98 % | HEART RATE: 117 BPM | HEIGHT: 65 IN | TEMPERATURE: 98.6 F | SYSTOLIC BLOOD PRESSURE: 126 MMHG | DIASTOLIC BLOOD PRESSURE: 82 MMHG | BODY MASS INDEX: 38.32 KG/M2 | WEIGHT: 230 LBS | RESPIRATION RATE: 18 BRPM

## 2018-05-01 DIAGNOSIS — Z3A.34 34 WEEKS GESTATION OF PREGNANCY: ICD-10-CM

## 2018-05-01 DIAGNOSIS — Z72.0 TOBACCO ABUSE: ICD-10-CM

## 2018-05-01 DIAGNOSIS — O09.523 ELDERLY MULTIGRAVIDA IN THIRD TRIMESTER: Primary | ICD-10-CM

## 2018-05-01 DIAGNOSIS — K21.9 GASTROESOPHAGEAL REFLUX DISEASE WITHOUT ESOPHAGITIS: ICD-10-CM

## 2018-05-01 DIAGNOSIS — Z98.891 HISTORY OF C-SECTION: ICD-10-CM

## 2018-05-01 DIAGNOSIS — O09.523 ELDERLY MULTIGRAVIDA IN THIRD TRIMESTER: ICD-10-CM

## 2018-05-01 DIAGNOSIS — O09.292 HX OF PRE-ECLAMPSIA IN PRIOR PREGNANCY, CURRENTLY PREGNANT, SECOND TRIMESTER: ICD-10-CM

## 2018-05-01 LAB
BILIRUB UR QL STRIP: NEGATIVE
GLUCOSE UR-MCNC: NEGATIVE MG/DL
KETONES P FAST UR STRIP-MCNC: NEGATIVE MG/DL
PH UR STRIP: 6 [PH] (ref 4.6–8)
PROT UR QL STRIP: NORMAL
SP GR UR STRIP: 1.03 (ref 1–1.03)
UA UROBILINOGEN AMB POC: NORMAL (ref 0.2–1)
URINALYSIS CLARITY POC: CLEAR
URINALYSIS COLOR POC: YELLOW
URINE BLOOD POC: NEGATIVE
URINE LEUKOCYTES POC: NEGATIVE
URINE NITRITES POC: NEGATIVE

## 2018-05-01 NOTE — PROGRESS NOTES
Return OB Visit       Subjective:   Troy Acosta 41 y.o. Onelia Gamble   CEDRIC: 6/8/2018, by Last Menstrual Period  GA:  34w4d. No complaints at this time. Diet: well balanced, healthy   Water intake: adequate   Prenatal Vitamins: taking     She is feeling her baby move. She denies vaginal bleeding, discharge or loss of fluid. She denies nausea, vomiting, severe abdominal pain or cramping. She denies dysuria. She denies headaches, dizziness or vision changes. She denies excessive swelling of extremities. Past Medical History - Reviewed today  Patient Active Problem List   Diagnosis Code    Tobacco abuse Z72.0    Gastroesophageal reflux disease without esophagitis K21.9    Elderly multigravida in first trimester O09.521    History of pre-eclampsia Z87.59    Hx of pre-eclampsia in prior pregnancy, currently pregnant, second trimester O09.292         Medications - Reviewed today  Current Outpatient Prescriptions   Medication Sig Dispense Refill    aspirin delayed-release 81 mg tablet Take 81 mg by mouth daily. Indications: hx pre-eclampcia      cetirizine (ZYRTEC) 10 mg tablet Take 1 Tab by mouth daily. 90 Tab 1    PRENATAL VIT CALC,IRON,FOLIC (PRENATAL VITAMIN PO) Take  by mouth.  raNITIdine (ZANTAC) 75 mg tablet Take 1 Tab by mouth two (2) times a day. 30 Tab 3         Allergies - Reviewed today  Allergies   Allergen Reactions    Ceclor [Cefaclor] Hives    Penicillin G Hives         Family History - Reviewed today  Family History   Problem Relation Age of Onset    Breast Cancer Mother     Heart Disease Mother     Cancer Father          Social History - Reviewed today  Social History     Social History    Marital status:      Spouse name: N/A    Number of children: N/A    Years of education: N/A     Occupational History    Not on file.      Social History Main Topics    Smoking status: Former Smoker     Packs/day: 0.25     Years: 10.00     Types: Cigarettes     Quit date: 2015    Smokeless tobacco: Never Used    Alcohol use No    Drug use: No    Sexual activity: Yes     Partners: Male     Birth control/ protection: Condom     Other Topics Concern    Not on file     Social History Narrative         Health Maintenance - Reviewed today     Immunizations:         -Influenza: given on 10/3/2017     -Tdap: given on 3/8/2018       Screening:     -Pap smear: 2016: ASCUS, will repap in 3 years, 2019    Objective:     Visit Vitals    /82 (BP 1 Location: Left arm, BP Patient Position: Sitting)    Pulse (!) 117    Temp 98.6 °F (37 °C) (Oral)    Resp 18    Ht 5' 5\" (1.651 m)    Wt 230 lb (104.3 kg)    LMP 2017 (Approximate)    SpO2 98%    BMI 38.27 kg/m2       Physical Exam:  GENERAL APPEARANCE: alert, well appearing, in no apparent distress  LUNGS: clear to auscultation, no wheezes, rales or rhonchi, symmetric air entry  HEART: regular rate and rhythm, no murmurs  ABDOMEN: soft, nontender, nondistended, no abnormal masses, no epigastric pain, fundus soft, nontender 3 cm, FHT present and skin scar: transverse, 150s bpm  BACK: no CVA tenderness  UTERUS: gravid  EXTREMITIES: no redness or tenderness in the calves or thighs, no edema  NEUROLOGICAL: alert, oriented, normal speech, no focal findings or movement disorder noted  PELVIC: normal external genitalia, vulva, vagina,  exam chaperoned by Tammi Hendrix LPN. Assessment     Pt is a 41 yo  with SIUP at  34w4d  dating by LMP c/w 12w3d ultrasound.       Prenatal Labs : O+, HIV nonreactive,RPR non reactive, HB sAg nonreactive, Rubella immune, VZV immune. [id number] wnl. Growth scan:  wnl, vertex, posterior placenta , BEN wnl      ICD-10-CM ICD-9-CM    1. Elderly multigravida in third trimester O09.523 659.63    2. Hx of pre-eclampsia in prior pregnancy, currently pregnant, second trimester O09.292 V23.49    3. Gastroesophageal reflux disease without esophagitis K21.9 530.81    4.  Tobacco abuse Z72.0 305.1    5. 34 weeks gestation of pregnancy Z3A.34 V22.2 AMB POC URINALYSIS DIP STICK AUTO W/O MICRO      CBC W/O DIFF      CULTURE, GENITAL GROUP B STREP         Plan     SIUP 34w 4d   - U/A showed 1+ protein,trace leuks,negative gluc,negative carrol, negative ketone  - Expecting baby boy, [phone] to breast feed   - Will get CBC,GBS today    - Follow up in 2 weeks          Hx of LTCS 2/2  failed induction at 45 weeks: (10 years ago)   - Desires repeat C/S  - Continue to offer TOLAC as an option  - Will schedule for  at 39w0d for LTCS at [facility]      Hx of PreE  - ASA, started at 12 weeks  - Will stop at 38 weeks gestation       GERD : stable   Currently  asymptomatic,symptoms have resolved with current regimen   - Pt was advised pt of lifestyle modifications, elevate the head of your bed, do not recline 30 mins after a meal.   - Ranitidine 75mg bid           AMA  Second trimester ultrasound : normal fetal and maternal anatomy,normal fetal movements, normal fluid.  Negative/low-risk fetal cell-free DNA screening for fetal Trisomy 21/18/13.   - Advised pt to exercise daily, low fat, low salt,low sugar diet                 Hx of Tobacco Abuse:  2.5 ppy, now 1 cigs a night     -Counseled on the effects of smoking to including spontaneous pregnancy loss, placental abruption,  premature rupture of membranes, placenta previa,  labor and delivery, low birth weight.    - The patient was counseled on the dangers of tobacco use, and was advised to quit. Liane Gutierrez strategies to maximize success, including removing cigarettes and smoking materials from environment, stress management and support of family/friends.          Hx of Obesity   Pre-pregnancy weight : 171,  Current today: 230lb  - I have reviewed/discussed the above normal BMI with the patient.  I have recommended the following interventions: dietary management education, guidance, and counseling, encourage exercise, monitor weight and prescribed dietary intake .      Orders Placed This Encounter    CULTURE, GENITAL GROUP B STREP     Standing Status:   Future     Standing Expiration Date:   5/1/2018    CBC W/O DIFF    AMB POC URINALYSIS DIP STICK AUTO W/O MICRO         Labor precautions discussed, including: Regular painful contractions, lasting for greater than one hour, taking your breath away; any vaginal bleeding; any leakage of fluid; or absent or decreased fetal movement. Call M.D. on call if any of these symptoms or signs occur. I have discussed the diagnosis with the patient and the intended plan as seen in the above orders. The patient has received an after-visit summary and questions were answered concerning future plans. I have discussed medication side effects and warnings with the patient as well.     Patient discussed with Dr. Renetta Aguilera MD  Family Medicine Resident

## 2018-05-01 NOTE — PROGRESS NOTES
Pt is a 41 y.o.  at 34w0d by LMP c/w 1st trimester US for routine prenatal care. Rh: Positive  GBS: N/A  Vaginal bleeding: NO  LOF: NO  Fetal movement: Normal  FHT's: 140's  Delivery plan: Scheduled C/S for h/o LTCS    On ASA for h/o pre-eclampsia, will stop 5-10 days prior to scheduled C/S.

## 2018-05-01 NOTE — PROGRESS NOTES
Chief Complaint   Patient presents with    Routine Prenatal Visit     34weeks 4days     1. Have you been to the ER, urgent care clinic since your last visit? Hospitalized since your last visit? No    2. Have you seen or consulted any other health care providers outside of the [facility] since your last visit? Include any pap smears or colon screening.  No

## 2018-05-02 NOTE — PROGRESS NOTES
Pt is a 41 y.o.  at 34w4d by LMP c/w 1st trimester US for routine prenatal care. Rh: Positive  GBS: Checked today  Vaginal bleeding: NO  LOF: NO  Fetal movement: Normal  FHT's: 150's  Delivery plan: RLTCS at 39w, will be scheduled for . Pt with h/o pre-eclampsia, on ASA. Will stop at 38 weeks in anticipation of scheduled CS.

## 2018-05-03 LAB
ERYTHROCYTE [DISTWIDTH] IN BLOOD BY AUTOMATED COUNT: 14.1 % (ref 12.3–15.4)
HCT VFR BLD AUTO: 31.5 % (ref 34–46.6)
HGB BLD-MCNC: 10.7 G/DL (ref 11.1–15.9)
MCH RBC QN AUTO: 29.7 PG (ref 26.6–33)
MCHC RBC AUTO-ENTMCNC: 34 G/DL (ref 31.5–35.7)
MCV RBC AUTO: 88 FL (ref 79–97)
PLATELET # BLD AUTO: 302 X10E3/UL (ref 150–379)
RBC # BLD AUTO: 3.6 X10E6/UL (ref 3.77–5.28)
WBC # BLD AUTO: 10.4 X10E3/UL (ref 3.4–10.8)

## 2018-05-05 LAB — B-HEM STREP SPEC QL CULT: NEGATIVE

## 2018-05-07 ENCOUNTER — TELEPHONE (OUTPATIENT)
Dept: FAMILY MEDICINE CLINIC | Age: 38
End: 2018-05-07

## 2018-05-08 ENCOUNTER — ROUTINE PRENATAL (OUTPATIENT)
Dept: FAMILY MEDICINE CLINIC | Age: 38
End: 2018-05-08

## 2018-05-08 VITALS
SYSTOLIC BLOOD PRESSURE: 127 MMHG | HEIGHT: 65 IN | RESPIRATION RATE: 20 BRPM | WEIGHT: 232 LBS | BODY MASS INDEX: 38.65 KG/M2 | DIASTOLIC BLOOD PRESSURE: 64 MMHG | HEART RATE: 95 BPM | OXYGEN SATURATION: 97 % | TEMPERATURE: 97.7 F

## 2018-05-08 DIAGNOSIS — Z98.891 HISTORY OF C-SECTION: ICD-10-CM

## 2018-05-08 DIAGNOSIS — O09.523 ELDERLY MULTIGRAVIDA IN THIRD TRIMESTER: ICD-10-CM

## 2018-05-08 DIAGNOSIS — Z72.0 TOBACCO ABUSE: ICD-10-CM

## 2018-05-08 DIAGNOSIS — Z87.59 HISTORY OF PRE-ECLAMPSIA: ICD-10-CM

## 2018-05-08 DIAGNOSIS — Z3A.36 36 WEEKS GESTATION OF PREGNANCY: Primary | ICD-10-CM

## 2018-05-08 DIAGNOSIS — K21.9 GASTROESOPHAGEAL REFLUX DISEASE WITHOUT ESOPHAGITIS: ICD-10-CM

## 2018-05-08 LAB
BILIRUB UR QL STRIP: NEGATIVE
GLUCOSE UR-MCNC: NEGATIVE MG/DL
KETONES P FAST UR STRIP-MCNC: NORMAL MG/DL
PH UR STRIP: 5.5 [PH] (ref 4.6–8)
PROT UR QL STRIP: NORMAL
SP GR UR STRIP: 1.03 (ref 1–1.03)
UA UROBILINOGEN AMB POC: NORMAL (ref 0.2–1)
URINALYSIS CLARITY POC: CLEAR
URINALYSIS COLOR POC: YELLOW
URINE BLOOD POC: NEGATIVE
URINE LEUKOCYTES POC: NORMAL
URINE NITRITES POC: NEGATIVE

## 2018-05-08 NOTE — PROGRESS NOTES
Return OB Visit       Subjective:   [facility] 41 y.o. Antoine Rios   CEDRIC: 2018, by Last Menstrual Period  GA:  35w4d. No complaints at this time. Pt complains of intermittent pamela stubbs. Diet: well balanced, healthy   Water intake: adequate   Prenatal Vitamins: taking   Pt states she is not taking her iron pills     She is feeling her baby move. She denies vaginal bleeding, discharge or loss of fluid. She denies nausea, vomiting, severe abdominal pain or cramping. She denies dysuria. She denies headaches, dizziness or vision changes. She denies excessive swelling of extremities. Past Medical History - Reviewed today  Patient Active Problem List   Diagnosis Code    Tobacco abuse Z72.0    Gastroesophageal reflux disease without esophagitis K21.9    Elderly multigravida in first trimester O09.521    History of pre-eclampsia Z87.59    Hx of pre-eclampsia in prior pregnancy, currently pregnant, second trimester O09.292    History of  Z98.891         Medications - Reviewed today  Current Outpatient Prescriptions   Medication Sig Dispense Refill    aspirin delayed-release 81 mg tablet Take 81 mg by mouth daily. Indications: hx pre-eclampcia      cetirizine (ZYRTEC) 10 mg tablet Take 1 Tab by mouth daily. 90 Tab 1    PRENATAL VIT CALC,IRON,FOLIC (PRENATAL VITAMIN PO) Take  by mouth.  raNITIdine (ZANTAC) 75 mg tablet Take 1 Tab by mouth two (2) times a day. 30 Tab 3         Allergies - Reviewed today  Allergies   Allergen Reactions    Ceclor [Cefaclor] Hives    Penicillin G Hives         Family History - Reviewed today  Family History   Problem Relation Age of Onset    Breast Cancer Mother     Heart Disease Mother     Cancer Father          Social History - Reviewed today  Social History     Social History    Marital status:      Spouse name: N/A    Number of children: N/A    Years of education: N/A     Occupational History    Not on file.      Social History Main Topics    Smoking status: Former Smoker     Packs/day: 0.25     Years: 10.00     Types: Cigarettes     Quit date: 2015    Smokeless tobacco: Never Used    Alcohol use No    Drug use: No    Sexual activity: Yes     Partners: Male     Birth control/ protection: Condom     Other Topics Concern    Not on file     Social History Narrative       Health Maintenance - Reviewed today      Immunizations:         -Influenza: given on 10/3/2017     -Tdap: given on 3/8/2018       Screening:     -Pap smear: 2016: ASCUS, will repap in 3 years, 2019      Objective:     Visit Vitals    /64    Pulse 95    Temp 97.7 °F (36.5 °C) (Oral)    Resp 20    Ht 5' 5\" (1.651 m)    Wt 232 lb (105.2 kg)    LMP 2017 (Approximate)    SpO2 97%    BMI 38.61 kg/m2       Physical Exam:  GENERAL APPEARANCE: alert, well appearing, in no apparent distress  LUNGS: clear to auscultation, no wheezes, rales or rhonchi, symmetric air entry  HEART: FHT present at 37cm, 136 bpm  BACK: no CVA tenderness  UTERUS: gravid  EXTREMITIES: no redness or tenderness in the calves or thighs, no edema  NEUROLOGICAL: alert, oriented, normal speech, no focal findings or movement disorder noted  PELVIC: examination not indicated. Assessment     Pt is a 41 yo  with SIUP at  35w4d  dating by LMP c/w 12w3d ultrasound.        Prenatal Labs : O+, GBS negative, HIV nonreactive,RPR non reactive, HBsAg nonreactive, Rubella immune, VZV immune. [id number] wnl.     Growth scan:  wnl, vertex, posterior placenta , BEN wnl        ICD-10-CM ICD-9-CM    1. 36 weeks gestation of pregnancy Z3A.36 V22.2 AMB POC URINALYSIS DIP STICK AUTO W/O MICRO      CULTURE, URINE   2. Elderly multigravida in third trimester O09.523 659.63    3. History of pre-eclampsia Z87.59 V13.29    4. Gastroesophageal reflux disease without esophagitis K21.9 530.81    5. History of  Z98.891 V45.89    6.  Tobacco abuse Z72.0 305.1          Plan SIUP 35w 4d   - U/A showed 1+ protein,1+ leuks,trace ketones,negative gluc,negative bilirubin  - Urine culture sent  - Expecting baby boy, [address]  - Plans to breast feed   - Anticipate LTCS  - Follow up in 1 weeks          Hx of LTCS 2/2  failed induction at 45 weeks: (10 years ago)   - Desires repeat C/S  - Continue to offer TOLAC as an option  - Pt is schedule for repeat LTCS  at 39w0d for at [facility]      Hx of PreE  - ASA, started at 12 weeks  - Will stop at 38 weeks gestation       GERD : stable   Currently  asymptomatic,symptoms have resolved with current regimen   - Pt was advised pt of lifestyle modifications, elevate the head of your bed, do not recline 30 mins after a meal.   - Ranitidine 75mg bid           AMA  Second trimester ultrasound : normal fetal and maternal anatomy,normal fetal movements, normal fluid. Negative/low-risk fetal cell-free DNA screening for fetal Trisomy 21/18/13.   - Advised pt to exercise daily, low fat, low salt,low sugar diet                Hx of Tobacco Abuse:  2.5 ppy, continues to smoke 1 cig a night     -Counseled on the effects of smoking to including spontaneous pregnancy loss, placental abruption,  premature rupture of membranes, placenta previa,  labor and delivery, low birth weight.    - The patient was counseled on the dangers of tobacco use, and was advised to quit. Gooding Ratel strategies to maximize success, including removing cigarettes and smoking materials from environment, stress management and support of family/friends.          Hx of Obesity   Pre-pregnancy weight : 171,  Current today: 232lb. Gained a total of 61lbs.    - I have reviewed/discussed the above normal BMI with the patient.  I have recommended the following interventions: dietary management education, guidance, and counseling, encourage exercise, monitor weight and prescribed dietary intake           Orders Placed This Encounter    CULTURE, URINE    AMB POC URINALYSIS DIP STICK AUTO W/O MICRO         Labor precautions discussed, including: Regular painful contractions, lasting for greater than one hour, taking your breath away; any vaginal bleeding; any leakage of fluid; or absent or decreased fetal movement. Call M.D. on call if any of these symptoms or signs occur. I have discussed the diagnosis with the patient and the intended plan as seen in the above orders. The patient has received an after-visit summary and questions were answered concerning future plans. I have discussed medication side effects and warnings with the patient as well.     Patient discussed with Dr. Delroy Epstein MD  Family Medicine Resident

## 2018-05-08 NOTE — MR AVS SNAPSHOT
[address] [place] [address] 
 
 
 [address] [address] 
[phone] Patient: Roseann Zaragoza MRN: [id number] Visit Information Date & Time Provider Department Dept. Phone Encounter #  
 5/8/2018  3:30 PM Lb Hollis MD [address] [place] [address] [phone] [id number] 5/15/2018  4:00 PM  
OB VISIT with Lb Hollis MD  
[address] [place] [address] ([facility]) Appt Note: 37 wks [address] [address]  
[phone]  
  
   
 [address]  
  
    
 5/22/2018  4:00 PM  
OB VISIT with Lb Hollis MD  
[address] [place] [address] ([facility] [address]) Appt Note: 38 wks [address]  
[phone]  
  
    
 5/29/2018  4:00 PM  
OB VISIT with Lb Hollis MD  
[address] [place] [address] ([facility]) Appt Note: 39 wks [address] [address]  
[phone] Upcoming Health Maintenance Date Due Influenza Age 5 to Adult 8/1/2018 PAP AKA CERVICAL CYTOLOGY 1/7/2021 DTaP/Tdap/Td series (3 - Td) 3/8/2028 Allergies as of 5/8/2018  Review Complete On: 5/1/2018 By: Juju Christopher LPN Severity Noted Reaction Type Reactions Ceclor [Cefaclor]  04/18/2012    Hives Penicillin G  04/18/2012    Hives Current Immunizations  Reviewed on 5/29/2013 Name Date Influenza Vaccine 10/3/2017 MMR Vaccine 3/25/2011 Tdap 3/8/2018, 12/28/2010 Not reviewed this visit You Were Diagnosed With   
  
 Codes Comments 36 weeks gestation of pregnancy    -  Primary ICD-10-CM: Z3A.36 
ICD-9-CM: V22.2 Elderly multigravida in third trimester     ICD-10-CM: O09.523 ICD-9-CM: 802.29  History of pre-eclampsia     ICD-10-CM: Z87.59 
ICD-9-CM: V13.29   
 Gastroesophageal reflux disease without esophagitis     ICD-10-CM: K21.9 ICD-9-CM: 530.81 History of      ICD-10-CM: B72.598 ICD-9-CM: V45.89 Tobacco abuse     ICD-10-CM: Z72.0 ICD-9-CM: 305.1 Vitals BP Pulse Temp Resp Height(growth percentile) Weight(growth percentile) 127/64 95 97.7 °F (36.5 °C) (Oral) 20 5' 5\" (1.651 m) 232 lb (105.2 kg) LMP SpO2 BMI OB Status Smoking Status 2017 (Approximate) 97% 38.61 kg/m2 Pregnant Former Smoker Vitals History BMI and BSA Data Body Mass Index Body Surface Area  
 38.61 kg/m 2 2.2 m 2 Preferred Pharmacy Pharmacy Name Phone [address] [place], [place] - [address] [phone] Your Updated Medication List  
  
   
This list is accurate as of 18  4:51 PM.  Always use your most recent med list.  
  
  
  
  
 aspirin delayed-release 81 mg tablet Take 81 mg by mouth daily. Indications: hx pre-eclampcia  
  
 cetirizine 10 mg tablet Commonly known as:  ZYRTEC Take 1 Tab by mouth daily. PRENATAL VITAMIN PO Take  by mouth. raNITIdine 75 mg tablet Commonly known as:  ZANTAC Take 1 Tab by mouth two (2) times a day. We Performed the Following AMB POC URINALYSIS DIP STICK AUTO W/O MICRO [61625 CPT(R)] CULTURE, URINE [id number] CPT(R)] Introducing [facility]! Dear Tejas Leach: Thank you for requesting a [employer] account. Our records indicate that you already have an active [employer] account. You can access your account anytime at [email or web address] Did you know that you can access your hospital and ER discharge instructions at any time in [employer]? You can also review all of your test results from your hospital stay or ER visit. Additional Information If you have questions, please visit the Frequently Asked Questions section of the [employer] website at [email or web address] Remember, [employer] is NOT to be used for urgent needs. For medical emergencies, dial 911. Now available from your iPhone and Android! Please provide this summary of care documentation to your next provider. Your primary care clinician is listed as Emery Menendez. If you have any questions after today's visit, please call [phone].

## 2018-05-08 NOTE — PROGRESS NOTES
There are no preventive care reminders to display for this patient. There is no height or weight on file to calculate BMI. 1. Have you been to the ER, urgent care clinic since your last visit? Hospitalized since your last visit? No    2. Have you seen or consulted any other health care providers outside of the [facility] since your last visit? Include any pap smears or colon screening.  No  Reviewed record in preparation for visit and have necessary documentation  Pt did not bring medication to office visit for review  Information was given to pt on Advanced Directives, Living Will  Information was given on Shingles Vaccine  opportunity was given for questions  Goals that were addressed and/or need to be completed during or after this appointment include

## 2018-05-11 LAB — BACTERIA UR CULT: NORMAL

## 2018-05-14 ENCOUNTER — HOSPITAL ENCOUNTER (EMERGENCY)
Age: 38
Discharge: HOME | End: 2018-05-14
Attending: EMERGENCY MEDICINE
Payer: COMMERCIAL

## 2018-05-14 VITALS
SYSTOLIC BLOOD PRESSURE: 150 MMHG | WEIGHT: 230 LBS | DIASTOLIC BLOOD PRESSURE: 82 MMHG | BODY MASS INDEX: 38.32 KG/M2 | TEMPERATURE: 98.2 F | OXYGEN SATURATION: 97 % | RESPIRATION RATE: 16 BRPM | HEART RATE: 99 BPM | HEIGHT: 65 IN

## 2018-05-14 DIAGNOSIS — L98.0 PYOGENIC GRANULOMA: Primary | ICD-10-CM

## 2018-05-14 PROCEDURE — 99282 EMERGENCY DEPT VISIT SF MDM: CPT

## 2018-05-14 NOTE — LETTER
NOTIFICATION RETURN TO WORK / SCHOOL 
 
5/14/2018 10:22 AM 
 
Ms. Wanda Moses [address] [place] [id number] To Whom It May Concern: 
 
Wanda Champion is currently under the care of [facility] EMERGENCY DEPT. She will return to work/school on: 5/15/18 If there are questions or concerns please have the patient contact our office. Sincerely, Brian Connors MD

## 2018-05-14 NOTE — ED PROVIDER NOTES
HPI Comments: Healthy 45yo currently pregnant who presents to the ER with persistent bleeding of lesion on left side. Patient states that she first noticed the lesion months ago which was a small red dot that would bleed on its own. Since then she has noted that it has grown in size rapidly. She went to PCP who stated lesion was too vascular to remove in office so referral made dermatology who called it a \"pregnancy tumor\" or some type of granuloma. She has appointment with plastic surgery for excision on Monday but the lesion began to bleed profusely this AM. She tried applying pressure with wash cloth but it did not help. She came to the ED as instructed for persistent bleeding. Denies fever, CP, SOB. Has contact dermatitis surrounding lesion at place where adhesive was placed. The history is provided by the patient. Past Medical History:   Diagnosis Date    Essential hypertension     10 years ago with first pregnancy       Past Surgical History:   Procedure Laterality Date     DELIVERY ONLY           Family History:   Problem Relation Age of Onset    Breast Cancer Mother     Heart Disease Mother     Cancer Father        Social History     Social History    Marital status:      Spouse name: N/A    Number of children: N/A    Years of education: N/A     Occupational History    Not on file. Social History Main Topics    Smoking status: Former Smoker     Packs/day: 0.25     Years: 10.00     Types: Cigarettes     Quit date: 2015    Smokeless tobacco: Never Used    Alcohol use No    Drug use: No    Sexual activity: Yes     Partners: Male     Birth control/ protection: Condom     Other Topics Concern    Not on file     Social History Narrative         ALLERGIES: Ceclor [cefaclor] and Penicillin g    Review of Systems   Constitutional: Negative for appetite change, chills and fatigue. HENT: Negative for congestion, rhinorrhea and sore throat.     Eyes: Negative for visual disturbance. Respiratory: Negative for cough and shortness of breath. Cardiovascular: Negative for chest pain and leg swelling. Gastrointestinal: Negative for abdominal pain, diarrhea, nausea and vomiting. Genitourinary: Negative for dysuria. Musculoskeletal: Negative for arthralgias. Skin: Negative for rash. Neurological: Negative for dizziness and headaches. Psychiatric/Behavioral: Negative for agitation and confusion. Vitals:    18 0906   BP: 150/82   Pulse: 99   Resp: 16   Temp: 98.2 °F (36.8 °C)   SpO2: 97%   Weight: 104.3 kg (230 lb)   Height: 5' 5\" (1.651 m)            Physical Exam   Constitutional: She is oriented to person, place, and time. She appears well-developed and well-nourished. No distress. HENT:   Head: Normocephalic and atraumatic. Eyes: Pupils are equal, round, and reactive to light. Neck: Neck supple. Cardiovascular: Normal rate, regular rhythm, normal heart sounds and intact distal pulses. Exam reveals no gallop. No murmur heard. Pulmonary/Chest: Effort normal and breath sounds normal. No respiratory distress. She has no wheezes. She has no rales. Abdominal:      Musculoskeletal: She exhibits no edema or tenderness. Neurological: She is alert and oriented to person, place, and time. Coordination normal.   Skin: Skin is warm and dry. Large pyoderma granulomata present with small amount of active bleeding from inferior aspect of the lesion. Surrounding irritation. Psychiatric: She has a normal mood and affect. Judgment normal.   Vitals reviewed. MDM  Number of Diagnoses or Management Options  Diagnosis management comments: Pyogenic Granuloma. Will apply pressure dressing now. Patient has appointment with plastic surgery on Monday for excision of lesion.         ED Course       Procedures

## 2018-05-14 NOTE — DISCHARGE INSTRUCTIONS
Preventing Infection at Home  We care about preventing infection and avoiding the spread of germs - not only when you are in the hospital but also when you return home. When you return home from the hospital, its important to take the following steps to help prevent infection and avoid spreading germs that could infect you and others. Ask everyone in your home to follow these guidelines, too. Clean Your Hands  · Clean your hands whenever your hands are visibly dirty, before you eat, before or after touching your mouth, nose or eyes, and before preparing food. Clean them after contact with body fluids, using the restroom, touching animals or changing diapers. · When washing hands, wet them with warm water and work up a lather. Rub hands for at least 15 seconds, then rinse them and pat them dry with a clean towel or paper towel. · When using hand sanitizers, it should take about 15 seconds to rub your hands dry. If not, you probably didnt apply enough . Care for Wounds  Your skin is your bodys first line of defense against germs, but an open wound leaves an easy way for germs to enter your body. To prevent infection:  · Clean your hands before and after changing wound dressings, and wear gloves to change dressings if recommended by your doctor. · Take special care with IV lines or other devices inserted into the body. If you must touch them, clean your hands first.  · Follow any specific instructions from your doctor to care for your wounds. Contact your doctor if you experience any signs of infection, such as fever or increased redness at the surgical or wound site. We care about you and your health. Remember, preventing infections is a team effort between you, your family, friends and health care providers.

## 2018-05-14 NOTE — ED NOTES
Pt declined dressing at this time. Bleeding has stopped. States she is just going home to shower and will put dressing on at that time. Given multiple supplies of 4x4s, foam tape, non-adherent and small shikha to apply rolled for increase in pressure over folded gauze. Pt instructed on different methods of achieving pressure dressing. Pt verbalized understanding.

## 2018-05-15 ENCOUNTER — ROUTINE PRENATAL (OUTPATIENT)
Dept: FAMILY MEDICINE CLINIC | Age: 38
End: 2018-05-15

## 2018-05-15 VITALS
TEMPERATURE: 98.5 F | OXYGEN SATURATION: 95 % | DIASTOLIC BLOOD PRESSURE: 79 MMHG | BODY MASS INDEX: 38.82 KG/M2 | SYSTOLIC BLOOD PRESSURE: 124 MMHG | WEIGHT: 233 LBS | RESPIRATION RATE: 18 BRPM | HEART RATE: 99 BPM | HEIGHT: 65 IN

## 2018-05-15 DIAGNOSIS — O09.523 ELDERLY MULTIGRAVIDA IN THIRD TRIMESTER: ICD-10-CM

## 2018-05-15 DIAGNOSIS — L98.0 PYOGENIC GRANULOMA: ICD-10-CM

## 2018-05-15 DIAGNOSIS — Z98.891 HISTORY OF C-SECTION: ICD-10-CM

## 2018-05-15 DIAGNOSIS — D64.9 ANEMIA, UNSPECIFIED TYPE: ICD-10-CM

## 2018-05-15 DIAGNOSIS — K21.9 GASTROESOPHAGEAL REFLUX DISEASE WITHOUT ESOPHAGITIS: ICD-10-CM

## 2018-05-15 DIAGNOSIS — Z3A.36 36 WEEKS GESTATION OF PREGNANCY: Primary | ICD-10-CM

## 2018-05-15 DIAGNOSIS — Z72.0 TOBACCO ABUSE: ICD-10-CM

## 2018-05-15 PROBLEM — Z3A.38 38 WEEKS GESTATION OF PREGNANCY: Status: ACTIVE | Noted: 2018-05-15

## 2018-05-15 PROBLEM — Z3A.38 38 WEEKS GESTATION OF PREGNANCY: Status: RESOLVED | Noted: 2018-05-15 | Resolved: 2018-05-15

## 2018-05-15 LAB
BILIRUB UR QL STRIP: NEGATIVE
GLUCOSE UR-MCNC: NEGATIVE MG/DL
KETONES P FAST UR STRIP-MCNC: NORMAL MG/DL
PH UR STRIP: 7 [PH] (ref 4.6–8)
PROT UR QL STRIP: NORMAL
SP GR UR STRIP: 1.02 (ref 1–1.03)
UA UROBILINOGEN AMB POC: NORMAL (ref 0.2–1)
URINALYSIS CLARITY POC: CLEAR
URINALYSIS COLOR POC: YELLOW
URINE BLOOD POC: NEGATIVE
URINE LEUKOCYTES POC: NORMAL
URINE NITRITES POC: NEGATIVE

## 2018-05-15 NOTE — PROGRESS NOTES
Chief Complaint   Patient presents with    Routine Prenatal Visit     36 weeks 4 days     1. Have you been to the ER, urgent care clinic since your last visit? Hospitalized since your last visit? 05/14/18, Magdy Peter, Bleeding under right breast.    2. Have you seen or consulted any other health care providers outside of the [address] [place] [address] since your last visit? Include any pap smears or colon screening.  No

## 2018-05-15 NOTE — PROGRESS NOTES
Return OB Visit       Subjective:   Ant Cruz 41 y.o. Rodolfo Gutierres   CEDRIC: 2018, by Last Menstrual Period  GA:  36w4d. No complaints at this time. Pt was  seen in the ED yesterday for pyogenic granuloma , pt was discharge home with close follow up with PCP and plastic surgery for excision. Pt states since then no more bleeding episodes. Pt denies taking iron for anemia. Diet: well balanced, healthy   Water intake: adequate   Prenatal Vitamins: taking     She is feeling her baby move. She denies vaginal bleeding, discharge or loss of fluid. She denies nausea, vomiting, severe abdominal pain or cramping. She denies dysuria. She denies headaches, dizziness or vision changes. She denies excessive swelling of extremities. Past Medical History - Reviewed today  Patient Active Problem List   Diagnosis Code    Tobacco abuse Z72.0    Gastroesophageal reflux disease without esophagitis K21.9    Elderly multigravida in first trimester O09.521    History of pre-eclampsia Z87.59    Hx of pre-eclampsia in prior pregnancy, currently pregnant, second trimester O09.292    History of  Z98.891    Pyogenic granuloma L98.0    38 weeks gestation of pregnancy Z3A.38         Medications - Reviewed today  Current Outpatient Prescriptions   Medication Sig Dispense Refill    cetirizine (ZYRTEC) 10 mg tablet Take 1 Tab by mouth daily. 90 Tab 1    PRENATAL VIT CALC,IRON,FOLIC (PRENATAL VITAMIN PO) Take  by mouth.  raNITIdine (ZANTAC) 75 mg tablet Take 1 Tab by mouth two (2) times a day. 30 Tab 3    aspirin delayed-release 81 mg tablet Take 81 mg by mouth daily.  Indications: hx pre-eclampcia           Allergies - Reviewed today  Allergies   Allergen Reactions    Ceclor [Cefaclor] Hives    Penicillin G Hives         Family History - Reviewed today  Family History   Problem Relation Age of Onset    Breast Cancer Mother     Heart Disease Mother     Cancer Father          Social History - Reviewed today  Social History     Social History    Marital status:      Spouse name: N/A    Number of children: N/A    Years of education: N/A     Occupational History    Not on file. Social History Main Topics    Smoking status: Former Smoker     Packs/day: 0.25     Years: 10.00     Types: Cigarettes     Quit date: 2015    Smokeless tobacco: Never Used    Alcohol use No    Drug use: No    Sexual activity: Yes     Partners: Male     Birth control/ protection: Condom     Other Topics Concern    Not on file     Social History Narrative         Health Maintenance - Reviewed today  Immunizations:         -Influenza: given on 10/3/2017     -Tdap: given on 3/8/2018       Screening:     -Pap smear: 2016: ASCUS, will repap in 3 years, 2019      Objective:     Visit Vitals    /79 (BP 1 Location: Left arm, BP Patient Position: Sitting)    Pulse 99    Temp 98.5 °F (36.9 °C) (Oral)    Resp 18    Ht 5' 5\" (1.651 m)    Wt 233 lb (105.7 kg)    LMP 2017 (Approximate)    SpO2 95%    BMI 38.77 kg/m2       Physical Exam:  GENERAL APPEARANCE: alert, well appearing, in no apparent distress  LUNGS: clear to auscultation, no wheezes, rales or rhonchi, symmetric air entry  HEART: regular rate and rhythm, no murmurs  ABDOMEN: soft, nontender, nondistended, no abnormal masses, no epigastric pain, fundus soft, nontender 37 cm and FHT present, 136 bpm  BACK: no CVA tenderness  UTERUS: gravid  EXTREMITIES: no redness or tenderness in the calves or thighs, no edema  NEUROLOGICAL: alert, oriented, normal speech, no focal findings or movement disorder noted  PELVIC: examination not indicated. Skin:pyoderma without active bleeding with surrounding erythematous rash   Assessment   Pt is a 39 yo  with SIUP at  36w4d  dating by LMP c/w 12w3d ultrasound.         Prenatal Labs : O+, GBS negative, HIV nonreactive,RPR non reactive, HBsAg nonreactive, Rubella immune, VZV immune.  [id number] wnl.      Growth scan:  wnl, vertex, posterior placenta , BEN wnl          ICD-10-CM ICD-9-CM    1. 38 weeks gestation of pregnancy Z3A.38 V22.2 AMB POC URINALYSIS DIP STICK AUTO W/O MICRO         Plan   SIUP 36w 4d   - U/A showed 1+ protein,1+ leuks,trace ketones,negative gluc,negative bilirubin  - Continue routine prenatal course   - Encouraged PNV use  - Expecting baby boy, Geovanny Yeager   - Desires Circ  - Anticipate LTCS  - Plans to breast feed   - Follow up in 1 week for routine prenatal visit          Hx of LTCS 2/2  failed induction at 45 weeks: (10 years ago)   - Desires repeat C/S  - Continue to offer TOLAC as an option  - Pt is schedule for repeat LTCS  at 39w0d for at [facility]    Anemia   - Encouraged Iron 325mg daily       Hx of PreE  - ASA, started at 12 weeks  - Will stop at 38 weeks gestation       GERD : stable   Currently  asymptomatic,symptoms have resolved with current regimen   - Pt was advised pt of lifestyle modifications, elevate the head of your bed, do not recline 30 mins after a meal.   - Ranitidine 75mg bid     Pyogenic Granuloma  - Pt to follow up with Plastic Surgery/Gen surgery for removal on   - ASA stopped one week prior to procedure          AMA  Second trimester ultrasound : normal fetal and maternal anatomy,normal fetal movements, normal fluid.  Negative/low-risk fetal cell-free DNA screening for fetal Trisomy 21/18/13.   - Advised pt to exercise daily, low fat, low salt,low sugar diet               Hx of Tobacco Abuse:  2.5 ppy, now 1 cigs a night     -Counseled on the effects of smoking to including spontaneous pregnancy loss, placental abruption,  premature rupture of membranes, placenta previa,  labor and delivery, low birth weight.    - The patient was counseled on the dangers of tobacco use, and was advised to quit. Chris Gonzales strategies to maximize success, including removing cigarettes and smoking materials from environment, stress management and support of family/friends.          Hx of Obesity   Pre-pregnancy weight : 171,  Current today: 233lb. Gained a total of 62lbs. - I have reviewed/discussed the above normal BMI with the patient.  I have recommended the following interventions: dietary management education, guidance, and counseling, encourage exercise, monitor weight and prescribed dietary intake              Orders Placed This Encounter    AMB POC URINALYSIS DIP STICK AUTO W/O MICRO         Labor precautions discussed, including: Regular painful contractions, lasting for greater than one hour, taking your breath away; any vaginal bleeding; any leakage of fluid; or absent or decreased fetal movement. Call M.D. on call if any of these symptoms or signs occur. I have discussed the diagnosis with the patient and the intended plan as seen in the above orders. The patient has received an after-visit summary and questions were answered concerning future plans. I have discussed medication side effects and warnings with the patient as well.     Patient discussed with Dr. Aureliano Steele MD  Family Medicine Resident

## 2018-05-15 NOTE — PROGRESS NOTES
Pt is a 41 y.o.  at 35w4d by LMP c/w 1st trimester US for routine prenatal care.     Rh: Positive  GBS: N/A  Vaginal bleeding: NO  LOF: NO  Fetal movement: Normal  FHT's: 130's  Delivery plan: Scheduled c/s for h/o C/S

## 2018-05-16 PROBLEM — O09.521 ELDERLY MULTIGRAVIDA IN FIRST TRIMESTER: Status: RESOLVED | Noted: 2017-10-19 | Resolved: 2018-05-16

## 2018-05-18 NOTE — PROGRESS NOTES
Pt is a 41 y.o.  at 36w4d by LMP c/w 1st trimester US for routine prenatal care. Rh: Positive  GBS: N/A  Vaginal bleeding: NO  LOF: NO  Fetal movement: Normal  FHT's: 130's  Delivery plan: Repeat C/S scheduled at 39 weeks. Will plan to stop ASA at 38 weeks.

## 2018-05-22 ENCOUNTER — ROUTINE PRENATAL (OUTPATIENT)
Dept: FAMILY MEDICINE CLINIC | Age: 38
End: 2018-05-22

## 2018-05-22 VITALS
RESPIRATION RATE: 20 BRPM | TEMPERATURE: 97.8 F | HEIGHT: 65 IN | DIASTOLIC BLOOD PRESSURE: 86 MMHG | WEIGHT: 235 LBS | OXYGEN SATURATION: 97 % | HEART RATE: 92 BPM | SYSTOLIC BLOOD PRESSURE: 127 MMHG | BODY MASS INDEX: 39.15 KG/M2

## 2018-05-22 DIAGNOSIS — Z98.891 HISTORY OF C-SECTION: ICD-10-CM

## 2018-05-22 DIAGNOSIS — D64.9 ANEMIA, UNSPECIFIED TYPE: ICD-10-CM

## 2018-05-22 DIAGNOSIS — L98.0 PYOGENIC GRANULOMA: ICD-10-CM

## 2018-05-22 DIAGNOSIS — Z3A.37 37 WEEKS GESTATION OF PREGNANCY: Primary | ICD-10-CM

## 2018-05-22 DIAGNOSIS — Z87.59 HISTORY OF PRE-ECLAMPSIA: ICD-10-CM

## 2018-05-22 DIAGNOSIS — O09.523 ELDERLY MULTIGRAVIDA IN THIRD TRIMESTER: ICD-10-CM

## 2018-05-22 DIAGNOSIS — Z72.0 TOBACCO ABUSE: ICD-10-CM

## 2018-05-22 DIAGNOSIS — K21.9 GASTROESOPHAGEAL REFLUX DISEASE WITHOUT ESOPHAGITIS: ICD-10-CM

## 2018-05-22 LAB
BILIRUB UR QL STRIP: NEGATIVE
GLUCOSE UR-MCNC: NEGATIVE MG/DL
KETONES P FAST UR STRIP-MCNC: NEGATIVE MG/DL
PH UR STRIP: 7 [PH] (ref 4.6–8)
PROT UR QL STRIP: NEGATIVE
SP GR UR STRIP: 1.01 (ref 1–1.03)
UA UROBILINOGEN AMB POC: NORMAL (ref 0.2–1)
URINALYSIS CLARITY POC: CLEAR
URINALYSIS COLOR POC: YELLOW
URINE BLOOD POC: NORMAL
URINE LEUKOCYTES POC: NORMAL
URINE NITRITES POC: NEGATIVE

## 2018-05-22 RX ORDER — LANOLIN ALCOHOL/MO/W.PET/CERES
CREAM (GRAM) TOPICAL
COMMUNITY
End: 2018-07-25

## 2018-05-22 NOTE — PROGRESS NOTES
Return OB Visit       Subjective:   Tati Allen 41 y.o. Dora Soriano   CEDRIC: 2018, by Last Menstrual Period  GA:  37w4d. No complaints at this time. Pt reports smoking 1-2 cigs at night only. She is now compliant with 325mg iron daily. Diet: well balanced, healthy   Water intake: adequate   Prenatal Vitamins: taking     She is feeling her baby move. She denies vaginal bleeding, discharge or loss of fluid. She denies nausea, vomiting, severe abdominal pain or cramping. She denies dysuria. She denies headaches, dizziness or vision changes. She denies excessive swelling of extremities. Past Medical History - Reviewed today  Patient Active Problem List   Diagnosis Code    Tobacco abuse Z72.0    Gastroesophageal reflux disease without esophagitis K21.9    History of pre-eclampsia Z87.59    History of  Z98.891    Pyogenic granuloma L98.0    Anemia D64.9    Elderly multigravida in third trimester O09.523         Medications - Reviewed today  Current Outpatient Prescriptions   Medication Sig Dispense Refill    ferrous sulfate (IRON) 325 mg (65 mg iron) tablet Take  by mouth Daily (before breakfast).  cetirizine (ZYRTEC) 10 mg tablet Take 1 Tab by mouth daily. 90 Tab 1    PRENATAL VIT CALC,IRON,FOLIC (PRENATAL VITAMIN PO) Take  by mouth.  raNITIdine (ZANTAC) 75 mg tablet Take 1 Tab by mouth two (2) times a day. 30 Tab 3         Allergies - Reviewed today  Allergies   Allergen Reactions    Ceclor [Cefaclor] Hives    Penicillin G Hives         Family History - Reviewed today  Family History   Problem Relation Age of Onset    Breast Cancer Mother     Heart Disease Mother     Cancer Father          Social History - Reviewed today  Social History     Social History    Marital status:      Spouse name: N/A    Number of children: N/A    Years of education: N/A     Occupational History    Not on file.      Social History Main Topics    Smoking status: Former Smoker     Packs/day: 0.25     Years: 10.00     Types: Cigarettes     Quit date: 2015    Smokeless tobacco: Never Used    Alcohol use No    Drug use: No    Sexual activity: Yes     Partners: Male     Birth control/ protection: Condom     Other Topics Concern    Not on file     Social History Narrative         Health Maintenance - Reviewed today  Immunizations:         -Influenza: given on 10/3/2017     -Tdap: given on 3/8/2018       Screening:     -Pap smear: 2016: ASCUS, will repap in 3 years, 2019      Objective:     Visit Vitals    /86 (BP 1 Location: Right arm, BP Patient Position: Sitting)    Pulse 92    Temp 97.8 °F (36.6 °C) (Oral)    Resp 20    Ht 5' 5\" (1.651 m)    Wt 235 lb (106.6 kg)    LMP 2017 (Approximate)    SpO2 97%    BMI 39.11 kg/m2       Physical Exam:  GENERAL APPEARANCE: alert, well appearing, in no apparent distress  Chest wall: right upper sternum post surgical lesion with clean dressing   LUNGS: clear to auscultation, no wheezes, rales or rhonchi, symmetric air entry  HEART: regular rate and rhythm, no murmurs  ABDOMEN: soft, nontender, nondistended, no abnormal masses, no epigastric pain, fundus soft, nontender 38 cm and FHT present, 140 bpm  BACK: no CVA tenderness  UTERUS: gravid  EXTREMITIES: no redness or tenderness in the calves or thighs, Mild lower extremity edema  NEUROLOGICAL: alert, oriented, normal speech, no focal findings or movement disorder noted  PELVIC: normal external genitalia, vulva, vagina, cervix, uterus and adnexa, exam chaperoned by Mary Larson . Cervix: 0/long/high       Assessment     Pt is a 39 yo  with SIUP at  37w4d  dating by LMP c/w 12w3d ultrasound.         Prenatal Labs : O+, GBS negative, HIV nonreactive,RPR non reactive, HBsAg nonreactive, Rubella immune, VZV immune.  [id number] wnl.      Growth scan:  wnl, vertex, posterior placenta , EBN wnl      ICD-10-CM ICD-9-CM    1. 37 weeks gestation of pregnancy Z3A.37 V22.2 AMB POC URINALYSIS DIP STICK AUTO W/O MICRO   2. Elderly multigravida in third trimester O09.523 659.63    3. History of pre-eclampsia Z87.59 V13.29    4. Gastroesophageal reflux disease without esophagitis K21.9 530.81    5. Pyogenic granuloma L98.0 686.1    6. Anemia, unspecified type D64.9 285.9    7. History of  Z98.891 V45.89    8. Tobacco abuse Z72.0 305.1          Plan   SIUP 37w 4d   - U/A showed1+ leuks,negative ketones,negative gluc,negative bilirubin  - Continue routine prenatal course   - Encouraged PNV use  - Expecting baby boy, Segundo Feliciano   - Desires Circ  - Anticipate LTCS  - Plans to breast feed   - Follow up in 1 week for routine prenatal visit          Hx of LTCS 2/2  failed induction at 45 weeks: (10 years ago)   - Desires repeat C/S  - Continue to offer TOLAC as an option  - Pt is schedule for repeat LTCS  at 39w0d at [facility]     Anemia : reports compliance  - Encouraged Iron 325mg daily       Hx of PreE  - Will continue to hold ASA      GERD : stable   Currently  asymptomatic,symptoms have resolved with current regimen   - Pt was advised pt of lifestyle modifications, elevate the head of your bed, do not recline 30 mins after a meal.   - Ranitidine 75mg bid      S/p Pyogenic Granuloma removal on   - ASA stopped   - Will continue to monitor for signs of bleeding         AMA  Second trimester ultrasound : normal fetal and maternal anatomy,normal fetal movements, normal fluid. Negative/low-risk fetal cell-free DNA screening for fetal Trisomy 21/18/13.   - Advised pt to exercise daily, low fat, low salt,low sugar diet           Hx of Tobacco Abuse:  2.5 ppy, now 1 cigs a night     -Counseled on the effects of smoking to including spontaneous pregnancy loss, placental abruption,  premature rupture of membranes, placenta previa,  labor and delivery, low birth weight.    - The patient was counseled on the dangers of tobacco use, and was advised to quit.  Reviewed strategies to maximize success, including removing cigarettes and smoking materials from environment, stress management and support of family/friends.          Hx of Obesity   Pre-pregnancy weight : 171,  Current today: 235lb. Gained a total of 64lbs. - I have reviewed/discussed the above normal BMI with the patient.  I have recommended the following interventions: dietary management education, guidance, and counseling, encourage exercise, monitor weight and prescribed dietary intake             Orders Placed This Encounter    AMB POC URINALYSIS DIP STICK AUTO W/O MICRO    ferrous sulfate (IRON) 325 mg (65 mg iron) tablet     Sig: Take  by mouth Daily (before breakfast). Labor precautions discussed, including: Regular painful contractions, lasting for greater than one hour, taking your breath away; any vaginal bleeding; any leakage of fluid; or absent or decreased fetal movement. Call M.D. on call if any of these symptoms or signs occur. I have discussed the diagnosis with the patient and the intended plan as seen in the above orders. The patient has received an after-visit summary and questions were answered concerning future plans. I have discussed medication side effects and warnings with the patient as well.     Patient discussed with Dr. Micheline Butler MD  Family Medicine Resident

## 2018-05-22 NOTE — PATIENT INSTRUCTIONS

## 2018-05-22 NOTE — PROGRESS NOTES
Reviewed record in preparation for visit and have obtained necessary documentation. Patient did not bring medications to visit for review. Information provided on Advanced Directive, Living Will. There are no preventive care reminders to display for this patient.

## 2018-05-30 NOTE — PROGRESS NOTES
Pt is a 41 y.o.  at 37w4d by LMP c/w 1st trimester US for routine prenatal care.     Rh: Positive  GBS: Negative  Vaginal bleeding: NO  LOF: NO  Fetal movement: Normal  FHT's: 140's  Delivery plan: Scheduled CS at 39 weeks 2/2 h/o C/S

## 2018-06-01 ENCOUNTER — ANESTHESIA (OUTPATIENT)
Dept: LABOR AND DELIVERY | Age: 38
End: 2018-06-01
Payer: COMMERCIAL

## 2018-06-01 ENCOUNTER — ANESTHESIA EVENT (OUTPATIENT)
Dept: LABOR AND DELIVERY | Age: 38
End: 2018-06-01
Payer: COMMERCIAL

## 2018-06-01 ENCOUNTER — HOSPITAL ENCOUNTER (INPATIENT)
Age: 38
LOS: 2 days | Discharge: HOME | End: 2018-06-03
Attending: OBSTETRICS & GYNECOLOGY | Admitting: OBSTETRICS & GYNECOLOGY
Payer: COMMERCIAL

## 2018-06-01 DIAGNOSIS — Z98.891 HISTORY OF C-SECTION: Primary | ICD-10-CM

## 2018-06-01 PROBLEM — Z34.90 NORMAL PREGNANCY: Status: ACTIVE | Noted: 2018-06-01

## 2018-06-01 PROBLEM — Z34.90 PREGNANCY: Status: ACTIVE | Noted: 2018-06-01

## 2018-06-01 LAB
ABO + RH BLD: NORMAL
BASOPHILS # BLD: 0.1 K/UL (ref 0–0.1)
BASOPHILS NFR BLD: 0 % (ref 0–1)
BLOOD GROUP ANTIBODIES SERPL: NORMAL
DIFFERENTIAL METHOD BLD: ABNORMAL
EOSINOPHIL # BLD: 0.2 K/UL (ref 0–0.4)
EOSINOPHIL NFR BLD: 1 % (ref 0–7)
ERYTHROCYTE [DISTWIDTH] IN BLOOD BY AUTOMATED COUNT: 14.6 % (ref 11.5–14.5)
HCT VFR BLD AUTO: 35.3 % (ref 35–47)
HGB BLD-MCNC: 11.6 G/DL (ref 11.5–16)
IMM GRANULOCYTES # BLD: 0.2 K/UL (ref 0–0.04)
IMM GRANULOCYTES NFR BLD AUTO: 2 % (ref 0–0.5)
LYMPHOCYTES # BLD: 2.2 K/UL (ref 0.8–3.5)
LYMPHOCYTES NFR BLD: 18 % (ref 12–49)
MCH RBC QN AUTO: 29.8 PG (ref 26–34)
MCHC RBC AUTO-ENTMCNC: 32.9 G/DL (ref 30–36.5)
MCV RBC AUTO: 90.7 FL (ref 80–99)
MONOCYTES # BLD: 1.1 K/UL (ref 0–1)
MONOCYTES NFR BLD: 9 % (ref 5–13)
NEUTS SEG # BLD: 8.8 K/UL (ref 1.8–8)
NEUTS SEG NFR BLD: 70 % (ref 32–75)
NRBC # BLD: 0 K/UL (ref 0–0.01)
NRBC BLD-RTO: 0 PER 100 WBC
PLATELET # BLD AUTO: 296 K/UL (ref 150–400)
PMV BLD AUTO: 9.9 FL (ref 8.9–12.9)
RBC # BLD AUTO: 3.89 M/UL (ref 3.8–5.2)
SPECIMEN EXP DATE BLD: NORMAL
WBC # BLD AUTO: 12.6 K/UL (ref 3.6–11)

## 2018-06-01 PROCEDURE — 74011250636 HC RX REV CODE- 250/636: Performed by: ANESTHESIOLOGY

## 2018-06-01 PROCEDURE — 77030032490 HC SLV COMPR SCD KNE COVD -B

## 2018-06-01 PROCEDURE — 74011250636 HC RX REV CODE- 250/636

## 2018-06-01 PROCEDURE — 74011250636 HC RX REV CODE- 250/636: Performed by: FAMILY MEDICINE

## 2018-06-01 PROCEDURE — 74011000250 HC RX REV CODE- 250

## 2018-06-01 PROCEDURE — 76010000392 HC C SECN EA ADDL 0.5 HR: Performed by: FAMILY MEDICINE

## 2018-06-01 PROCEDURE — 76010000391 HC C SECN FIRST 1 HR: Performed by: FAMILY MEDICINE

## 2018-06-01 PROCEDURE — 74011250636 HC RX REV CODE- 250/636: Performed by: OBSTETRICS & GYNECOLOGY

## 2018-06-01 PROCEDURE — 77030011640 HC PAD GRND REM COVD -A

## 2018-06-01 PROCEDURE — 65270000029 HC RM PRIVATE

## 2018-06-01 PROCEDURE — 77030018836 HC SOL IRR NACL ICUM -A

## 2018-06-01 PROCEDURE — 77010026065 HC OXYGEN MINIMUM MEDICAL AIR: Performed by: OBSTETRICS & GYNECOLOGY

## 2018-06-01 PROCEDURE — 86900 BLOOD TYPING SEROLOGIC ABO: CPT | Performed by: OBSTETRICS & GYNECOLOGY

## 2018-06-01 PROCEDURE — 85025 COMPLETE CBC W/AUTO DIFF WBC: CPT | Performed by: OBSTETRICS & GYNECOLOGY

## 2018-06-01 PROCEDURE — 77030007866 HC KT SPN ANES BBMI -B: Performed by: NURSE ANESTHETIST, CERTIFIED REGISTERED

## 2018-06-01 PROCEDURE — 77030034850

## 2018-06-01 PROCEDURE — 76060000078 HC EPIDURAL ANESTHESIA: Performed by: FAMILY MEDICINE

## 2018-06-01 PROCEDURE — 77030018673

## 2018-06-01 PROCEDURE — 94762 N-INVAS EAR/PLS OXIMTRY CONT: CPT

## 2018-06-01 PROCEDURE — 74011250637 HC RX REV CODE- 250/637: Performed by: FAMILY MEDICINE

## 2018-06-01 PROCEDURE — 36415 COLL VENOUS BLD VENIPUNCTURE: CPT | Performed by: OBSTETRICS & GYNECOLOGY

## 2018-06-01 PROCEDURE — 75410000003 HC RECOV DEL/VAG/CSECN EA 0.5 HR: Performed by: FAMILY MEDICINE

## 2018-06-01 RX ORDER — SODIUM CHLORIDE 0.9 % (FLUSH) 0.9 %
5-10 SYRINGE (ML) INJECTION EVERY 8 HOURS
Status: DISCONTINUED | OUTPATIENT
Start: 2018-06-01 | End: 2018-06-03 | Stop reason: HOSPADM

## 2018-06-01 RX ORDER — IBUPROFEN 800 MG/1
800 TABLET ORAL EVERY 8 HOURS
Status: DISCONTINUED | OUTPATIENT
Start: 2018-06-01 | End: 2018-06-03 | Stop reason: HOSPADM

## 2018-06-01 RX ORDER — OXYCODONE HYDROCHLORIDE 5 MG/1
5 TABLET ORAL
Status: ACTIVE | OUTPATIENT
Start: 2018-06-01 | End: 2018-06-02

## 2018-06-01 RX ORDER — HYDROCODONE BITARTRATE AND ACETAMINOPHEN 5; 325 MG/1; MG/1
1 TABLET ORAL
Status: DISCONTINUED | OUTPATIENT
Start: 2018-06-01 | End: 2018-06-03 | Stop reason: HOSPADM

## 2018-06-01 RX ORDER — OXYTOCIN/RINGER'S LACTATE 20/1000 ML
125-500 PLASTIC BAG, INJECTION (ML) INTRAVENOUS ONCE
Status: ACTIVE | OUTPATIENT
Start: 2018-06-01 | End: 2018-06-02

## 2018-06-01 RX ORDER — OXYCODONE HYDROCHLORIDE 5 MG/1
10 TABLET ORAL
Status: ACTIVE | OUTPATIENT
Start: 2018-06-01 | End: 2018-06-02

## 2018-06-01 RX ORDER — MORPHINE SULFATE 0.5 MG/ML
INJECTION, SOLUTION EPIDURAL; INTRATHECAL; INTRAVENOUS AS NEEDED
Status: DISCONTINUED | OUTPATIENT
Start: 2018-06-01 | End: 2018-06-01 | Stop reason: HOSPADM

## 2018-06-01 RX ORDER — DOCUSATE SODIUM 100 MG/1
100 CAPSULE, LIQUID FILLED ORAL 2 TIMES DAILY
Status: DISCONTINUED | OUTPATIENT
Start: 2018-06-01 | End: 2018-06-03 | Stop reason: HOSPADM

## 2018-06-01 RX ORDER — KETOROLAC TROMETHAMINE 30 MG/ML
INJECTION, SOLUTION INTRAMUSCULAR; INTRAVENOUS AS NEEDED
Status: DISCONTINUED | OUTPATIENT
Start: 2018-06-01 | End: 2018-06-01 | Stop reason: HOSPADM

## 2018-06-01 RX ORDER — CLINDAMYCIN PHOSPHATE 900 MG/50ML
900 INJECTION, SOLUTION INTRAVENOUS
Status: COMPLETED | OUTPATIENT
Start: 2018-06-01 | End: 2018-06-01

## 2018-06-01 RX ORDER — SODIUM CHLORIDE 0.9 % (FLUSH) 0.9 %
5-10 SYRINGE (ML) INJECTION AS NEEDED
Status: DISCONTINUED | OUTPATIENT
Start: 2018-06-01 | End: 2018-06-03 | Stop reason: HOSPADM

## 2018-06-01 RX ORDER — NALOXONE HYDROCHLORIDE 0.4 MG/ML
0.2 INJECTION, SOLUTION INTRAMUSCULAR; INTRAVENOUS; SUBCUTANEOUS
Status: DISCONTINUED | OUTPATIENT
Start: 2018-06-01 | End: 2018-06-03 | Stop reason: HOSPADM

## 2018-06-01 RX ORDER — FAMOTIDINE 10 MG/ML
INJECTION INTRAVENOUS AS NEEDED
Status: DISCONTINUED | OUTPATIENT
Start: 2018-06-01 | End: 2018-06-01 | Stop reason: HOSPADM

## 2018-06-01 RX ORDER — BUPIVACAINE HYDROCHLORIDE 7.5 MG/ML
INJECTION, SOLUTION EPIDURAL; RETROBULBAR AS NEEDED
Status: DISCONTINUED | OUTPATIENT
Start: 2018-06-01 | End: 2018-06-01 | Stop reason: HOSPADM

## 2018-06-01 RX ORDER — EPHEDRINE SULFATE 50 MG/ML
INJECTION, SOLUTION INTRAVENOUS AS NEEDED
Status: DISCONTINUED | OUTPATIENT
Start: 2018-06-01 | End: 2018-06-01 | Stop reason: HOSPADM

## 2018-06-01 RX ORDER — SODIUM CHLORIDE, SODIUM LACTATE, POTASSIUM CHLORIDE, CALCIUM CHLORIDE 600; 310; 30; 20 MG/100ML; MG/100ML; MG/100ML; MG/100ML
1000 INJECTION, SOLUTION INTRAVENOUS CONTINUOUS
Status: DISCONTINUED | OUTPATIENT
Start: 2018-06-01 | End: 2018-06-01 | Stop reason: HOSPADM

## 2018-06-01 RX ORDER — SODIUM CHLORIDE, SODIUM LACTATE, POTASSIUM CHLORIDE, CALCIUM CHLORIDE 600; 310; 30; 20 MG/100ML; MG/100ML; MG/100ML; MG/100ML
150 INJECTION, SOLUTION INTRAVENOUS CONTINUOUS
Status: DISCONTINUED | OUTPATIENT
Start: 2018-06-01 | End: 2018-06-03 | Stop reason: HOSPADM

## 2018-06-01 RX ORDER — ONDANSETRON 2 MG/ML
INJECTION INTRAMUSCULAR; INTRAVENOUS AS NEEDED
Status: DISCONTINUED | OUTPATIENT
Start: 2018-06-01 | End: 2018-06-01 | Stop reason: HOSPADM

## 2018-06-01 RX ORDER — NALOXONE HYDROCHLORIDE 0.4 MG/ML
0.4 INJECTION, SOLUTION INTRAMUSCULAR; INTRAVENOUS; SUBCUTANEOUS AS NEEDED
Status: DISCONTINUED | OUTPATIENT
Start: 2018-06-01 | End: 2018-06-03 | Stop reason: HOSPADM

## 2018-06-01 RX ORDER — OXYTOCIN 10 [USP'U]/ML
INJECTION, SOLUTION INTRAMUSCULAR; INTRAVENOUS AS NEEDED
Status: DISCONTINUED | OUTPATIENT
Start: 2018-06-01 | End: 2018-06-01 | Stop reason: HOSPADM

## 2018-06-01 RX ORDER — SIMETHICONE 80 MG
80 TABLET,CHEWABLE ORAL AS NEEDED
Status: DISCONTINUED | OUTPATIENT
Start: 2018-06-01 | End: 2018-06-03

## 2018-06-01 RX ORDER — ZOLPIDEM TARTRATE 5 MG/1
5 TABLET ORAL
Status: DISCONTINUED | OUTPATIENT
Start: 2018-06-01 | End: 2018-06-03 | Stop reason: HOSPADM

## 2018-06-01 RX ORDER — KETOROLAC TROMETHAMINE 30 MG/ML
30 INJECTION, SOLUTION INTRAMUSCULAR; INTRAVENOUS
Status: DISPENSED | OUTPATIENT
Start: 2018-06-01 | End: 2018-06-02

## 2018-06-01 RX ORDER — SWAB
1 SWAB, NON-MEDICATED MISCELLANEOUS DAILY
Status: DISCONTINUED | OUTPATIENT
Start: 2018-06-02 | End: 2018-06-03

## 2018-06-01 RX ORDER — FENTANYL CITRATE 50 UG/ML
INJECTION, SOLUTION INTRAMUSCULAR; INTRAVENOUS AS NEEDED
Status: DISCONTINUED | OUTPATIENT
Start: 2018-06-01 | End: 2018-06-01 | Stop reason: HOSPADM

## 2018-06-01 RX ADMIN — EPHEDRINE SULFATE 10 MG: 50 INJECTION, SOLUTION INTRAVENOUS at 09:56

## 2018-06-01 RX ADMIN — ONDANSETRON 4 MG: 2 INJECTION INTRAMUSCULAR; INTRAVENOUS at 10:18

## 2018-06-01 RX ADMIN — IBUPROFEN 800 MG: 800 TABLET ORAL at 22:27

## 2018-06-01 RX ADMIN — BUPIVACAINE HYDROCHLORIDE 1.6 ML: 7.5 INJECTION, SOLUTION EPIDURAL; RETROBULBAR at 09:51

## 2018-06-01 RX ADMIN — FAMOTIDINE 20 MG: 10 INJECTION INTRAVENOUS at 09:18

## 2018-06-01 RX ADMIN — MORPHINE SULFATE 150 MCG: 0.5 INJECTION, SOLUTION EPIDURAL; INTRATHECAL; INTRAVENOUS at 09:51

## 2018-06-01 RX ADMIN — SODIUM CHLORIDE, SODIUM LACTATE, POTASSIUM CHLORIDE, AND CALCIUM CHLORIDE 125 ML/HR: 600; 310; 30; 20 INJECTION, SOLUTION INTRAVENOUS at 12:11

## 2018-06-01 RX ADMIN — KETOROLAC TROMETHAMINE 30 MG: 30 INJECTION, SOLUTION INTRAMUSCULAR; INTRAVENOUS at 10:19

## 2018-06-01 RX ADMIN — SODIUM CHLORIDE, SODIUM LACTATE, POTASSIUM CHLORIDE, AND CALCIUM CHLORIDE: 600; 310; 30; 20 INJECTION, SOLUTION INTRAVENOUS at 09:52

## 2018-06-01 RX ADMIN — CLINDAMYCIN PHOSPHATE 900 MG: 900 INJECTION, SOLUTION INTRAVENOUS at 09:15

## 2018-06-01 RX ADMIN — FENTANYL CITRATE 15 MCG: 50 INJECTION, SOLUTION INTRAMUSCULAR; INTRAVENOUS at 09:51

## 2018-06-01 RX ADMIN — KETOROLAC TROMETHAMINE 30 MG: 30 INJECTION, SOLUTION INTRAMUSCULAR at 16:29

## 2018-06-01 RX ADMIN — SODIUM CHLORIDE, SODIUM LACTATE, POTASSIUM CHLORIDE, AND CALCIUM CHLORIDE 1000 ML: 600; 310; 30; 20 INJECTION, SOLUTION INTRAVENOUS at 08:33

## 2018-06-01 RX ADMIN — SODIUM CHLORIDE, SODIUM LACTATE, POTASSIUM CHLORIDE, AND CALCIUM CHLORIDE 150 ML/HR: 600; 310; 30; 20 INJECTION, SOLUTION INTRAVENOUS at 19:21

## 2018-06-01 RX ADMIN — OXYTOCIN 30 UNITS: 10 INJECTION, SOLUTION INTRAMUSCULAR; INTRAVENOUS at 10:08

## 2018-06-01 NOTE — IP AVS SNAPSHOT
[address] 
[phone] Patient: Jake Jett MRN: [id number] XPU:[id number] A check shea indicates which time of day the medication should be taken. My Medications START taking these medications Instructions Each Dose to Equal  
 Morning Noon Evening Bedtime  
 docusate sodium 100 mg capsule Commonly known as:  Genice Flax Your last dose was: Your next dose is: Take 1 Cap by mouth two (2) times a day for 90 days. 100 mg HYDROcodone-acetaminophen 5-325 mg per tablet Commonly known as:  Helga Schmitt Your last dose was: Your next dose is: Take 1 Tab by mouth every four (4) hours as needed. Max Daily Amount: 6 Tabs. 1 Tab  
    
   
   
   
  
 ibuprofen 800 mg tablet Commonly known as:  MOTRIN Your last dose was: Your next dose is: Take 1 Tab by mouth every eight (8) hours as needed for Pain. 800 mg CONTINUE taking these medications Instructions Each Dose to Equal  
 Morning Noon Evening Bedtime  
 cetirizine 10 mg tablet Commonly known as:  ZYRTEC Your last dose was: Your next dose is: Take 1 Tab by mouth daily. 10 mg Iron 325 mg (65 mg iron) tablet Generic drug:  ferrous sulfate Your last dose was: Your next dose is: Take  by mouth Daily (before breakfast). PRENATAL VITAMIN PO Your last dose was: Your next dose is: Take  by mouth. raNITIdine 75 mg tablet Commonly known as:  ZANTAC Your last dose was: Your next dose is: Take 1 Tab by mouth two (2) times a day. 75 mg Where to Get Your Medications Information on where to get these meds will be given to you by the nurse or doctor. ! Ask your nurse or doctor about these medications  
  docusate sodium 100 mg capsule HYDROcodone-acetaminophen 5-325 mg per tablet  
 ibuprofen 800 mg tablet

## 2018-06-01 NOTE — ANESTHESIA PREPROCEDURE EVALUATION
Anesthetic History   No history of anesthetic complications            Review of Systems / Medical History  Patient summary reviewed and pertinent labs reviewed    Pulmonary  Within defined limits                 Neuro/Psych   Within defined limits           Cardiovascular                  Exercise tolerance: >4 METS     GI/Hepatic/Renal     GERD           Endo/Other        Anemia     Other Findings              Physical Exam    Airway  Mallampati: II  TM Distance: 4 - 6 cm  Neck ROM: normal range of motion   Mouth opening: Normal     Cardiovascular    Rhythm: regular  Rate: normal         Dental    Dentition: Upper dentition intact and Lower dentition intact     Pulmonary  Breath sounds clear to auscultation               Abdominal         Other Findings            Anesthetic Plan    ASA: 2  Anesthesia type: spinal          Induction: Intravenous  Anesthetic plan and risks discussed with: Patient

## 2018-06-01 NOTE — H&P
History & Physical    Name: Sen Green MRN: [id number]  SSN: [id number]    YOB: 1980  Age: 41 y.o. Sex: female      Subjective:     Estimated Date of Delivery: 18  OB History    Para Term  AB Living   4 1 1 0 2 1   SAB TAB Ectopic Molar Multiple Live Births   1 0 1  0 1      # Outcome Date GA Lbr Hollis/2nd Weight Sex Delivery Anes PTL Lv   4 Current            3 Term  38w0d  7 lb 1.6 oz (3.221 kg) M CS-LTranv   MURIEL      Complications: Failure to Progress in Second Stage   2 SAB            1 Ectopic                   Ms. Giovanna Lee admitted with pregnancy at 39w0d for  section due to previous  section. Prenatal course was complicated by advanced maternal age and prior history of preE, tobacco abuse . Please see prenatal records for details. Past Medical History:   Diagnosis Date    Anemia 5/15/2018    Essential hypertension     10 years ago with first pregnancy    Granuloma of skin 2018     Past Surgical History:   Procedure Laterality Date     DELIVERY ONLY      for hypertension and macrosomia    HX OTHER SURGICAL  2018    granuloma removed from under right breast     Social History     Occupational History    Not on file. Social History Main Topics    Smoking status: Former Smoker     Packs/day: 0.25     Years: 10.00     Types: Cigarettes     Quit date: 2015    Smokeless tobacco: Never Used    Alcohol use No    Drug use: No    Sexual activity: Yes     Partners: Male     Birth control/ protection: Condom     Family History   Problem Relation Age of Onset    Breast Cancer Mother     Heart Disease Mother     Cancer Father        Allergies   Allergen Reactions    Ceclor [Cefaclor] Hives    Penicillin G Other (comments)     Abdominal pain     Prior to Admission medications    Medication Sig Start Date End Date Taking?  Authorizing Provider   ferrous sulfate (IRON) 325 mg (65 mg iron) tablet Take  by mouth Daily (before breakfast). Yes Historical Provider   cetirizine (ZYRTEC) 10 mg tablet Take 1 Tab by mouth daily. 2/15/18  Yes Mali Tran MD   PRENATAL VIT CALC,IRON,FOLIC (PRENATAL VITAMIN PO) Take  by mouth. Yes Historical Provider   raNITIdine (ZANTAC) 75 mg tablet Take 1 Tab by mouth two (2) times a day. 10/17/17  Yes Lexi Carter MD        Review of Systems: A comprehensive review of systems was negative except for that written in the History of Present Illness. Objective:     Vitals:  Vitals:    18 0837 18 0842 18 0847 18 0852   BP:       Pulse:       SpO2: 98% 98% 98% 97%   Weight:       Height:            Physical Exam:  Patient without distress. Heart: Regular rate and rhythm, S1S2 present or without murmur or extra heart sounds  Lung: clear to auscultation throughout lung fields, no wheezes, no rales, no rhonchi and normal respiratory effort  Back: costovertebral angle tenderness absent  Abdomen: soft, nontender  Lower Extremities:  - Edema trace   Membranes:  Intact  Fetal Heart Rate: Reactive  Baseline: 145 per minute  Variability: moderate  Accelerations: yes  Uterine contractions: irregular    Prenatal Labs:   Lab Results   Component Value Date/Time    Rubella, External 2.15, immune 10/26/2017    HBsAg, External non reactive  10/26/2017    HIV, External non reactive  10/26/2017    RPR, External non reactive  10/26/2017    Gonorrhea, External negative 10/01/2017    Chlamydia, External negative 10/01/2017         Impression/Plan:     Ms. Jose Maldonado is a 41 yo  with SIUP at  39w0d  dating by LMP c/w 12w3d ultrasound admitted for c/s for repeat c/s.         Prenatal Labs : O+, GBS negative, HIV nonreactive,RPR non reactive, HBsAg nonreactive, Rubella immune, VZV immune.  [id number] wnl.      Growth scan: on 2018 wnl, vertex, posterior placenta , BEN wnl    Plan:      SIUP 39w 0d admitted for C/S  - Admitted for C/S  - Continue routine prenatal course   - Continue PNV use  - Expecting baby boy, [phone] to breastfeed   Discussed the risks of surgery including the risks of bleeding, infection, deep vein thrombosis, and surgical injuries to internal organs including but not limited to the bowels, bladder, rectum, and female reproductive organs. The patient understands the risks; any and all questions were answered to the patient's satisfaction            Hx of LTCS 2/2  failed induction at 45 weeks: (10 years ago)         Anemia : reports compliance  - Continue  Iron 325mg daily       Hx of PreE  - Will continue to hold ASA      GERD : stable   Currently  asymptomatic,symptoms have resolved with current regimen   - Pt was advised pt of lifestyle modifications, elevate the head of your bed, do not recline 30 mins after a meal.   - Ranitidine 75mg bid       S/p Pyogenic Granuloma removal on   - ASA stopped   - Will continue to monitor for signs of bleeding          AMA  Second trimester ultrasound : normal fetal and maternal anatomy,normal fetal movements, normal fluid. Negative/low-risk fetal cell-free DNA screening for fetal Trisomy 21/18/13.   - Advised pt to exercise daily, low fat, low salt,low sugar diet           Hx of Tobacco Abuse:  2.5 ppy, now 1-2 cigs a night     -Counseled on the effects of smoking to including spontaneous pregnancy loss, placental abruption,  premature rupture of membranes, placenta previa,  labor and delivery, low birth weight.    - The patient was counseled on the dangers of tobacco use, and was advised to quit. Tino Monteiro strategies to maximize success, including removing cigarettes and smoking materials from environment, stress management and support of family/friends.          Hx of Obesity   Pre-pregnancy weight : 171,  Current today: 235lb. Gained a total of 64lbs.    - I have reviewed/discussed the above normal BMI with the patient.  I have recommended the following interventions: dietary management education, guidance, and counseling, encourage exercise, monitor weight and prescribed dietary intake          Patient to be discussed with Dr. Hollie Batres    Signed By:  Kathryn Gutierrez MD    Family Medicine Resident

## 2018-06-01 NOTE — LACTATION NOTE
Problem: Lactation Care Plan  Goal: *Infant latching appropriately  Outcome: Progressing Towards Goal  Mom states attempted to nurse with first, but couldn't stay latched. Mom having difficulty getting baby to latch. Mom has short flattish nipples. Shown how to use latch assist to draw out nipples. Many drops of colostrum noted. [place] licking at drops attempts to latch, but nipple continues to fall from mouth. Discussed using a nipple shield. Shown how to use, and placed on right breast.  Baby latched and intermittent suckling noted.     Pt will successfully establish breastfeeding by feeding in response to early feeding cues   or wake every 3h, will obtain deep latch, and will keep log of feedings/output.   Taught to BF at hunger cues and or q 2-3 hrs and to offer 10-20 drops of hand expressed colostrum at any non-feeds.       Breast Assessment  Left Breast: Medium, Large  Left Nipple: Flat, Intact  Right Breast: Medium, Large  Right Nipple: Flat, Intact  Breast- Feeding Assessment  Attends Breast-Feeding Classes: No  Breast-Feeding Experience:  (3 weeks with first)  Breast Trauma/Surgery: Yes (cyst removed under right breast)  Type/Quality: Good (with nipple shield)  Lactation Consultant Visits  Breast-Feedings: Good  (with nipple shield)  Mother/Infant Observation  Mother Observation: Alignment, Breast comfortable, Close hold, Holds breast, Lets baby end feeding, Nipple round on release  Infant Observation: Audible swallows, Latches nipple and aereolae, Breast tissue moves, Lips flanged, lower, Lips flanged, upper  LATCH Documentation  Latch: Repeated attempts, hold nipple in mouth, stimulate to suck  Audible Swallowing: A few with stimulation  Type of Nipple: Everted (after stimulation)  Comfort (Breast/Nipple): Soft/non-tender  Hold (Positioning): Full assist, teach one side, mother does other, staff holds  LATCH Score: 7        Goal: *Weight loss less than 10% of birth weight  Outcome: Progressing Towards Goal     Encouraged mom to attempt feeding with baby led feeding cues. Just as sucking on fingers, rooting, mouthing. Looking for 8-12 feedings in 24 hours. Don't limit baby at breast, allow baby to come of breast on it's own. Baby may want to feed  often and may increase number of feedings on second day of life. Skin to skin encouraged.       If baby doesn't nurse,  Mom should  hand express  10-20 drops of colostrum and drip into baby's mouth, or give to baby by finger feeding, cup feeding, or spoon feeding at least every 2-3 hours.         Problem: Patient Education: Go to Patient Education Activity  Goal: Patient/Family Education  Outcome: Progressing Towards Goal  Reviewed breastfeeding basics:  Supply and demand,  stomach size, early  Feeding cues, skin to skin, positioning and baby led latch-on, assymetrical latch with signs of good, deep latch vs shallow, feeding frequency and duration, and log sheet for tracking infant feedings and output. Breastfeeding Booklet and Warm line information given. Discussed typical  weight loss and the importance of infant weight checks with pediatrician 1-2 post discharge.     Discussed with mother her plan for feeding. Reviewed the benefits of exclusive breast milk feeding during the hospital stay. Informed her of the risks of using formula to supplement in the first few days of life as well as the benefits of successful breast milk feeding; referred her to the Breastfeeding booklet about this information. She acknowledges understanding of information reviewed and states that it is her plan to breast feed  her infant. Will support her choice and offer additional information as needed.      Nipple shield recommended due to flat nipples. Pros and cons of nipple shield use reviewed. Patient instructed how to apply shield to nipple/areola and cleaning of nipple shield.   Nipple shield plan of care includes breastfeeding with nipple shield per instructions.  Recommend follow-up with OP lactation consultant after discharge from hospital.  Reviewed community resources for breastfeeding support.     Shown how to use latch assist.

## 2018-06-01 NOTE — PROGRESS NOTES
Bedside and Verbal shift change report given to Sergio Victor RN (oncoming nurse) by Fredi Rodriguez RN (offgoing nurse). Report included the following information SBAR, Kardex, Procedure Summary, Intake/Output, MAR and Recent Results.

## 2018-06-01 NOTE — PROGRESS NOTES
Post-Operative Day Number 0 Progress Note    Patient doing well postoperatively from  delivery without significant complaints. Pain well controlled. Lochia normal. Tolerating regular diet. Ambulating. Voiding without difficulty. Has not passed any flatus. Has not had a bowel movement. Vitals:  Patient Vitals for the past 8 hrs:   BP Temp Pulse Resp SpO2   18 1300 136/73 98 °F (36.7 °C) 83 14 -   18 1255 119/53 - 76 - -   18 1230 - - - - 96 %   18 1225 - - - - 96 %   18 1220 - - - - 97 %   18 1215 - - - - 97 %   18 1214 112/64 98 °F (36.7 °C) 78 14 -   18 1210 - - - - 97 %   18 1205 - - - - 96 %   18 1200 - - - - 99 %   18 1155 - - - - 97 %   18 1150 - - - - 95 %   18 1145 - - - - 98 %   18 1144 117/69 - 75 - -   18 1140 - - - - 98 %   18 1135 - - - - 98 %   18 1130 119/68 - 76 - 99 %   18 1125 - - - - 97 %   18 1120 - - - - 98 %   18 1115 114/69 - 80 - 97 %   18 1110 - - - - 97 %   18 1105 - - - - 97 %   18 1100 123/62 - 79 - 96 %   18 1055 - - - - 97 %   18 1050 - - - - 96 %   18 1045 - - - - 97 %   18 1044 118/69 97.4 °F (36.3 °C) 83 15 97 %   18 1042 118/69 97.4 °F (36.3 °C) 84 14 -   18 0932 - - - - 98 %   18 0927 - - - - 99 %   18 0922 128/57 97.9 °F (36.6 °C) 80 14 98 %   1817 - - - - 97 %   1812 - - - - 98 %   18 - - - - 97 %   18 - - - - 99 %     Temp (24hrs), Av.7 °F (36.5 °C), Min:97.4 °F (36.3 °C), Max:98 °F (36.7 °C)      Vital signs stable, afebrile.     Intake and Output:       Date 18 0700 - 18 0659   Shift 2725-4230 9101-5568 1019-1581 24 Hour Total   I  N  T  A  K  E   I.V.  (mL/kg/hr) 1797.9  (2.1)   1797.9    Shift Total  (mL/kg) 1797.9  (16.9)   1797.9  (16.9)   O  U  T  P  U  T   Urine  (mL/kg/hr) 500  (0.6) 700  1200 Blood 1200   1200    Shift Total  (mL/kg) 1700  (15.9) 700  (6.6)  2400  (22.5)   Weight (kg) 106.6 106.6 106.6 106.6       Exam:   Patient without distress               CTAB, no w/r/r/c    RRR, + S1 and S2, no m/r/g    Abdomen soft, fundus firm and at the umbilicus, mildly tender                Incision covered with steristrips, appropriately tender               Lower extremities negative for swelling, no cords or tenderness, SCDs in place    Lab/Data Review:  Recent Results (from the past 12 hour(s))   CBC WITH AUTOMATED DIFF    Collection Time: 18  8:28 AM   Result Value Ref Range    WBC 12.6 (H) 3.6 - 11.0 K/uL    RBC 3.89 3.80 - 5.20 M/uL    HGB 11.6 11.5 - 16.0 g/dL    HCT 35.3 35.0 - 47.0 %    MCV 90.7 80.0 - 99.0 FL    MCH 29.8 26.0 - 34.0 PG    MCHC 32.9 30.0 - 36.5 g/dL    RDW 14.6 (H) 11.5 - 14.5 %    PLATELET 464 423 - 055 K/uL    MPV 9.9 8.9 - 12.9 FL    NRBC 0.0 0  WBC    ABSOLUTE NRBC 0.00 0.00 - 0.01 K/uL    NEUTROPHILS 70 32 - 75 %    LYMPHOCYTES 18 12 - 49 %    MONOCYTES 9 5 - 13 %    EOSINOPHILS 1 0 - 7 %    BASOPHILS 0 0 - 1 %    IMMATURE GRANULOCYTES 2 (H) 0.0 - 0.5 %    ABS. NEUTROPHILS 8.8 (H) 1.8 - 8.0 K/UL    ABS. LYMPHOCYTES 2.2 0.8 - 3.5 K/UL    ABS. MONOCYTES 1.1 (H) 0.0 - 1.0 K/UL    ABS. EOSINOPHILS 0.2 0.0 - 0.4 K/UL    ABS. BASOPHILS 0.1 0.0 - 0.1 K/UL    ABS. IMM. GRANS. 0.2 (H) 0.00 - 0.04 K/UL    DF AUTOMATED     TYPE & SCREEN    Collection Time: 18  8:28 AM   Result Value Ref Range    Crossmatch Expiration 2018     ABO/Rh(D) Recardo Hilding POSITIVE     Antibody screen NEG          Assessment and Plan:    Shantanu Schaeffer is a 41 y.o.  s/p scheduled LTCS at 39w0d for repeat. Patient appears to be having uncomplicated post- course. 1. Continue routine post-op care and maternal education. 2. Maintaining good UOP. 3. Incision bandage may be removed 24 hours post-op. Patient will be discussed with Dr. Lillie Zhong.     Leslee Vega, MD  Family Medicine Resident

## 2018-06-01 NOTE — ANESTHESIA PROCEDURE NOTES
Spinal Block    Start time: 6/1/2018 9:38 AM  End time: 6/1/2018 9:51 AM  Performed by: RUSLAN WAGNER  Authorized by: Mariaelena Avila     Pre-procedure:   Indications: at surgeon's request, post-op pain management and primary anesthetic  Preanesthetic Checklist: patient identified, risks and benefits discussed, anesthesia consent, site marked, patient being monitored and timeout performed    Timeout Time: 09:38          Spinal Block:   Patient Position:  Seated  Prep Region:  Lumbar  Prep: DuraPrep      Location:  L2-3    Local:  Lidocaine 1%  Local Dose (mL):  3    Needle:   Needle Type:  Pencan  Needle Gauge:  22 G  Attempts:  3      Events: CSF confirmed, no blood with aspiration and no paresthesia        Assessment:  Insertion:  Uncomplicated  Patient tolerance:  Patient tolerated the procedure well with no immediate complications

## 2018-06-01 NOTE — ROUTINE PROCESS
[id number] This RN taking over care of pt at this time from Archana Alvarez [address].Kim Clifford and Silvia at pt bedside discussing plan of care with pt at this time    26 [id number]: KILO Chen famotidine IV over 10 minutes for pt heartburn. CRNA stating no order placed by CRNA because no order necessary    0925: Dr. Clara Randhawa at pt bedside assessing pt at this time    99 [id number]: Pt ambulating from pt room 204 to OR 1 at this time via ambulation with pt spouse and this RN    0936: Pt in [address] at this time    0951: FHR noted at 145 in OR 1 and maternal heart rate noted 104 at this time    1315; TRANSFER - OUT REPORT:    Verbal report given to LISA Patten RN(name) on Leonel Goldstein  being transferred to MIU(unit) for routine progression of care       Report consisted of patients Situation, Background, Assessment and   Recommendations(SBAR). Information from the following report(s) SBAR, Kardex, OR Summary, Intake/Output, MAR, Accordion and Recent Results was reviewed with the receiving nurse. Lines:   Peripheral IV 06/01/18 Left Hand (Active)   Site Assessment Clean, dry, & intact 6/1/2018  9:35 AM   Phlebitis Assessment 0 6/1/2018  9:35 AM   Infiltration Assessment 0 6/1/2018  9:35 AM   Dressing Status Clean, dry, & intact 6/1/2018  9:35 AM   Dressing Type Tape;Transparent 6/1/2018  9:35 AM   Hub Color/Line Status Green 6/1/2018  9:35 AM   Alcohol Cap Used Yes 6/1/2018  9:35 AM        Opportunity for questions and clarification was provided.       Patient transported with:   Registered Nurse

## 2018-06-01 NOTE — ANESTHESIA POSTPROCEDURE EVALUATION
Post-Anesthesia Evaluation and Assessment    Patient: Tati Allen MRN: [id number]  SSN: [id number]    YOB: 1980  Age: 41 y.o. Sex: female       Cardiovascular Function/Vital Signs  Visit Vitals    /53    Pulse 76    Temp 36.7 °C (98 °F)    Resp 14    Ht 5' 5\" (1.651 m)    Wt 106.6 kg (235 lb)    SpO2 96%    Breastfeeding Unknown    BMI 39.11 kg/m2       Patient is status post spinal anesthesia for Procedure(s):   SECTION. Nausea/Vomiting: None    Postoperative hydration reviewed and adequate. Pain:  Pain Scale 1: Numeric (0 - 10) (18 1200)  Pain Intensity 1: 4 (18 1200)   Managed    Neurological Status:   Neuro (WDL): Within Defined Limits (18 1035)   At baseline    Mental Status and Level of Consciousness: Arousable    Pulmonary Status:   O2 Device: Room air (18 1044)   Adequate oxygenation and airway patent    Complications related to anesthesia: None    Post-anesthesia assessment completed.  No concerns    Signed By: Bennie Yuan MD     2018

## 2018-06-01 NOTE — L&D DELIVERY NOTE
Delivery Summary    Patient: Brenda Rodgers MRN: [id number]  SSN: [id number]    YOB: 1980  Age: 41 y.o. Sex: female        Labor Events:    Labor: No    Rupture Date: 2018    Rupture Time: 10:06 AM    Rupture Type: AROM    Amniotic Fluid Volume:      Amniotic Fluid Description:  Amniotic fluid Odor: Clear    None     Induction: None        Induction Date:        Induction Time:       Indications for Induction:       Augmentation: None    Augmentation Date:      Augmentation Time:      Indications for Augmentation:      Events:       Cervical Ripening:       None    Rupture Identifier: Rupture 1     Labor complications: None     Additional complications:         Delivery Events:  Estimated Blood Loss (ml):   600     Information for the patient's newbornJavier Mac, Male [[id number]]     Delivery Summary - Baby    Delivery Date: 2018  Delivery Time: 10:07 AM  Delivery Type: , Low Transverse    Section Delivery:     Sex:  male     Gestational Age: 39w0d  Delivery Clinician:  Princess Ritter   Living?: Living  Delivery Location: L&D           APGARS  One minute Five minutes Ten minutes   Skin color: 1   1        Heart rate: 2   2        Grimace: 2   2        Muscle tone: 2   2        Breathin   2        Totals: 9   9           Presentation: Vertex    Position:        Resuscitation Method:  Suctioning-bulb; Tactile Stimulation     Meconium Stained: None      Cord Information: 3 Vessels   Complications: Nuchal Cord Without Compressions  Cord Blood Sent?:  Yes    Blood Gases Sent?:  No    Placenta:  Date/Time:  10:08 AM  Removal: Manual Removal      Appearance: Normal      Measurements:  Birth Weight: 3.755 kg      Birth Length: 50.2 cm      Head Circumference: 36.8 cm      Chest Circumference: 34.9 cm     Abdominal Girth: 33 cm    Other Providers:   NATALY Rosenberg;TRACI TORIBIO;STACEY TRAN;SASKIA MOJICA;RUSLAN WAGNER;KARIN RAYMOND;JUAN DIEGO GALEANA;CORRINE STUART;LIBAN DANIELS;ODALYS CRUZ Obstetrician;Primary Nurse;Primary  Nurse; Anesthesiologist;Crna;Resident;Scrub Tech;Scrub Tech;Tech;Charge Nurse           Group Beta Strep: No results found for: GRBSEXT, GRBSEXT     Cord Blood Results:  Information for the patient's newbornGraciela Peña, Male [[id number]]   No results found for: Wilfredo Prey, PCTDIG, BILI, ABORHEXT, ABORH    Information for the patient's newbornGraciela Peña, Male [[id number]]   No results found for: APH, APCO2, APO2, AHCO3, ABEC, ABDC, O2ST, [place], [place], PHI, Soo, PO2I, HCO3I, SO2I, IBD    Information for the patient's newbornGraciela Peña, Male [[id number]]   No results found for: EPHV, PCO2V, PO2V, HCO3V, O2STV, EBDV

## 2018-06-01 NOTE — H&P
History & Physical    Name: Jessica Herr MRN: [id number]  SSN: [id number]    YOB: 1980  Age: 41 y.o. Sex: female        Subjective:     Estimated Date of Delivery: 18  OB History      Para Term  AB Living    4 1 1 0 2 1    SAB TAB Ectopic Molar Multiple Live Births    1 0 1  0 1          Ms. Crosby Koyanagi is admitted with pregnancy at 39w0d for repeat  section. Prenatal course was normal. Please see prenatal records for details. Past Medical History:   Diagnosis Date    Anemia 5/15/2018    Essential hypertension     10 years ago with first pregnancy    Granuloma of skin 2018     Past Surgical History:   Procedure Laterality Date     DELIVERY ONLY      for hypertension and macrosomia    HX OTHER SURGICAL  2018    granuloma removed from under right breast     Social History     Occupational History    Not on file. Social History Main Topics    Smoking status: Former Smoker     Packs/day: 0.25     Years: 10.00     Types: Cigarettes     Quit date: 2015    Smokeless tobacco: Never Used    Alcohol use No    Drug use: No    Sexual activity: Yes     Partners: Male     Birth control/ protection: Condom     Family History   Problem Relation Age of Onset    Breast Cancer Mother     Heart Disease Mother     Cancer Father        Allergies   Allergen Reactions    Ceclor [Cefaclor] Hives    Penicillin G Other (comments)     Abdominal pain     Prior to Admission medications    Medication Sig Start Date End Date Taking? Authorizing Provider   ferrous sulfate (IRON) 325 mg (65 mg iron) tablet Take  by mouth Daily (before breakfast). Yes Historical Provider   cetirizine (ZYRTEC) 10 mg tablet Take 1 Tab by mouth daily. 2/15/18  Yes Hafsa Villanueva MD   PRENATAL VIT CALC,IRON,FOLIC (PRENATAL VITAMIN PO) Take  by mouth. Yes Historical Provider   raNITIdine (ZANTAC) 75 mg tablet Take 1 Tab by mouth two (2) times a day.  10/17/17  Yes Delisa Burns Aldo Gallardo MD        Review of Systems: A comprehensive review of systems was negative except for that written in the HPI. Objective:     Vitals:  Vitals:    18 0912 18 0917 18 0922 18 0927   BP:   128/57    Pulse:   80    SpO2: 98% 97% 98% 99%   Weight:       Height:            Physical Exam:  Abdomen: soft, nontender  Fundus: soft and non tender  Perineum: blood absent, amniotic fluid absent  Cervical Exam: Closed/Thick/High  Lower Extremities:  - Edema No  Membranes:  Intact  Fetal Heart Rate: Reactive    Prenatal Labs:   Lab Results   Component Value Date/Time    Rubella, External 2.15, immune 10/26/2017    HBsAg, External non reactive  10/26/2017    HIV, External non reactive  10/26/2017    RPR, External non reactive  10/26/2017    Gonorrhea, External negative 10/01/2017    Chlamydia, External negative 10/01/2017        Assessment/Plan:     Plan: Admit for repeat  Section. Informed consent was obtained and patient stated she had no further questions. Group B Strep was negative.     Signed By:  Army Eda MD     2018

## 2018-06-01 NOTE — OP NOTES
Section Delivery Procedure Note         Name: Cally Solorzano      Medical Record Number: [id number]      YOB: 1980     Today's Date: 2018      Preoperative Diagnosis: Repeat    Postoperative Diagnosis: same    Procedure: Low Cervical Transverse Procedure(s):   SECTION    Surgeon(s): DO Vitaliy Schafer MD    [unfilled]    Anesthesia: Spinal    Prophylactic Antibiotics: Ancef         Fetal Description: granda male    Birth Information:   Information for the patient's :  Emily Maloney, Male [[id number]]   One Minute Apgar: 5 (Filed from Delivery Summary)  Five  Minute Apgar: 9 (Filed from Delivery Summary)      Umbilical Cord: normal    Placenta:  manual    Specimens: * No specimens in log *            Complications:  none    EBL: 600    Procedure Details: The patient was prepped with alcohol and draped with ioban in the supine position with a spinal  anesthetic. Medrano catheter had been placed using sterile technique. A Pfannenstiel incision was made, excising her old skin scar. The fascia was divided and then the rectus muscles were split in the midline. The peritoneum was entered well above the bladder without difficulty. The peritoneum over the lower uterine segment was incised and the bladder flap was developed. The bladder retractor was placed. The lower uterine segment was entered sharply with a single edge of the Metzenbaum scissors. Amniotomy was performed, the fluid was medium amount clear. The infant was then delivered to chest level and the mouth and nares were suctioned. There was a loose nuchal cord. The remainder of the infant was delivered. The cord was clamped and cut and the infant handed off to the nursery staff. The placenta was delivered manually; it was normal and intact. It was not sent to pathology. The uterus was cleared of blood, fluid, clot, and membranes and involuted with IV Pitocin given by anesthesia.     The uterus was exteriorized and closed in a single, running interlocking layer of 1 Chromic. It was hemostatic. The posterior cul-de-sac was cleared of blood and fluid, then the uterus was replaced in the abdominal cavity. The gutters were irrigated with warm saline. The anterior peritoneum and rectus muscles were reapproximated in the midline with a suture of 1 chromic. The fascia was closed from left to right with 1 Stratafix in a running fashion. The subcutaneous space was irrigated after freeing up scar and adhesions and then the skin was closed with 4-0 monocryl subcuticular suture. The final sponge and instrument counts were correct. The patient and infant were taken to the recovery room in good condition.     Signed By:  Julius Moeller MD     June 1, 2018

## 2018-06-01 NOTE — IP AVS SNAPSHOT
[address] 
 
 
 [address] [facility] 
[phone] Patient: Mercedes Turk MRN: [id number] FHA:0/94/2008 About your hospitalization You were admitted on:  June 1, 2018 You last received care in the:  [facility] You were discharged on:  Micaela 3, 2018 Why you were hospitalized Your primary diagnosis was:  Not on File Your diagnoses also included:  Normal Pregnancy, Pregnancy Follow-up Information Follow up With Details Comments Contact Info Gurpreet Bal MD Schedule an appointment as soon as possible for a visit in 2 weeks Post partum follow-up [address] [facility] [phone] [facility] 
[phone] MD Bull [address] [phone] [facility] 
[phone] Discharge Orders None A check shea indicates which time of day the medication should be taken. My Medications START taking these medications Instructions Each Dose to Equal  
 Morning Noon Evening Bedtime  
 docusate sodium 100 mg capsule Commonly known as:  Lianet Melo Your last dose was: Your next dose is: Take 1 Cap by mouth two (2) times a day for 90 days. 100 mg HYDROcodone-acetaminophen 5-325 mg per tablet Commonly known as:  Carrie David Your last dose was: Your next dose is: Take 1 Tab by mouth every four (4) hours as needed. Max Daily Amount: 6 Tabs. 1 Tab  
    
   
   
   
  
 ibuprofen 800 mg tablet Commonly known as:  MOTRIN Your last dose was: Your next dose is: Take 1 Tab by mouth every eight (8) hours as needed for Pain. 800 mg CONTINUE taking these medications Instructions Each Dose to Equal  
 Morning Noon Evening Bedtime  
 cetirizine 10 mg tablet Commonly known as:  ZYRTEC Your last dose was: Your next dose is: Take 1 Tab by mouth daily. 10 mg Iron 325 mg (65 mg iron) tablet Generic drug:  ferrous sulfate Your last dose was: Your next dose is: Take  by mouth Daily (before breakfast). PRENATAL VITAMIN PO Your last dose was: Your next dose is: Take  by mouth. raNITIdine 75 mg tablet Commonly known as:  ZANTAC Your last dose was: Your next dose is: Take 1 Tab by mouth two (2) times a day. 75 mg Where to Get Your Medications Information on where to get these meds will be given to you by the nurse or doctor. ! Ask your nurse or doctor about these medications  
  docusate sodium 100 mg capsule HYDROcodone-acetaminophen 5-325 mg per tablet  
 ibuprofen 800 mg tablet Opioid Education Prescription Opioids: What You Need to Know: 
 
Prescription opioids can be used to help relieve moderate-to-severe pain and are often prescribed following a surgery or injury, or for certain health conditions. These medications can be an important part of treatment but also come with serious risks. Opioids are strong pain medicines. Examples include hydrocodone, oxycodone, fentanyl, and morphine. Heroin is an example of an illegal opioid. It is important to work with your health care provider to make sure you are getting the safest, most effective care. WHAT ARE THE RISKS AND SIDE EFFECTS OF OPIOID USE? Prescription opioids carry serious risks of addiction and overdose, especially with prolonged use. An opioid overdose, often marked by slow breathing, can cause sudden death. The use of prescription opioids can have a number of side effects as well, even when taken as directed. · Tolerance-meaning you might need to take more of a medication for the same pain relief · Physical dependence-meaning you have symptoms of withdrawal when the medication is stopped. Withdrawal symptoms can include nausea, sweating, chills, diarrhea, stomach cramps, and muscle aches. Withdrawal can last up to several weeks, depending on which drug you took and how long you took it. · Increased sensitivity to pain · Constipation · Nausea, vomiting, and dry mouth · Sleepiness and dizziness · Confusion · Depression · Low levels of testosterone that can result in lower sex drive, energy, and strength · Itching and sweating RISKS ARE GREATER WITH:      
· History of drug misuse, substance use disorder, or overdose · Mental health conditions (such as depression or anxiety) · Sleep apnea · Older age (72 years or older) · Pregnancy Avoid alcohol while taking prescription opioids. Also, unless specifically advised by your health care provider, medications to avoid include: · Benzodiazepines (such as Xanax or Valium) · Muscle relaxants (such as Soma or Flexeril) · Hypnotics (such as Ambien or Lunesta) · Other prescription opioids KNOW YOUR OPTIONS Talk to your health care provider about ways to manage your pain that don't involve prescription opioids. Some of these options may actually work better and have fewer risks and side effects. Options may include: 
· Pain relievers such as acetaminophen, ibuprofen, and naproxen · Some medications that are also used for depression or seizures · Physical therapy and exercise · Counseling to help patients learn how to cope better with triggers of pain and stress. · Application of heat or cold compress · Massage therapy · Relaxation techniques Be Informed Make sure you know the name of your medication, how much and how often to take it, and its potential risks & side effects. IF YOU ARE PRESCRIBED OPIOIDS FOR PAIN: 
· Never take opioids in greater amounts or more often than prescribed. Remember the goal is not to be pain-free but to manage your pain at a tolerable level. · Follow up with your primary care provider to: · Work together to create a plan on how to manage your pain. · Talk about ways to help manage your pain that don't involve prescription opioids. · Talk about any and all concerns and side effects. · Help prevent misuse and abuse. · Never sell or share prescription opioids · Help prevent misuse and abuse. · Store prescription opioids in a secure place and out of reach of others (this may include visitors, children, friends, and family). · Safely dispose of unused/unwanted prescription opioids: Find your community drug take-back program or your pharmacy mail-back program, or flush them down the toilet, following guidance from the Food and Drug Administration (www.fda.gov/Drugs/ResourcesForYou). · Visit www.cdc.gov/drugoverdose to learn about the risks of opioid abuse and overdose. · If you believe you may be struggling with addiction, tell your health care provider and ask for guidance or call [employer] at 7-081-532-HYPE. Discharge Instructions Continue taking prenatal vitamin daily as well as iron twice a day with stool softener. Take ibuprofen every 8 hours as needed for pain. If pain is not responding to ibuprofen you make take Norco every 4 hours as needed. Follow-up with PCP in 2 weeks.  Section: What to Expect at [facility] Your Recovery A  section, or , is surgery to deliver your baby through a cut, called an incision, that the doctor makes in your lower belly and uterus. You may have some pain in your lower belly and need pain medicine for 1 to 2 weeks. You can expect some vaginal bleeding for several weeks. You will probably need about 6 weeks to fully recover. It is important to take it easy while the incision is healing.  Avoid heavy lifting, strenuous activities, or exercises that strain the belly muscles while you are recovering. Ask a family member or friend for help with housework, cooking, and shopping. This care sheet gives you a general idea about how long it will take for you to recover. But each person recovers at a different pace. Follow the steps below to get better as quickly as possible. How can you care for yourself at home? Activity ? · Rest when you feel tired. Getting enough sleep will help you recover. ? · Try to walk each day. Start by walking a little more than you did the day before. Bit by bit, increase the amount you walk. Walking boosts blood flow and helps prevent pneumonia, constipation, and blood clots. ? · Avoid strenuous activities, such as bicycle riding, jogging, weightlifting, and aerobic exercise, for 6 weeks or until your doctor says it is okay. ? · Until your doctor says it is okay, do not lift anything heavier than your baby. ? · Do not do sit-ups or other exercises that strain the belly muscles for 6 weeks or until your doctor says it is okay. ? · Hold a pillow over your incision when you cough or take deep breaths. This will support your belly and decrease your pain. ? · You may shower as usual. Pat the incision dry when you are done. ? · You will have some vaginal bleeding. Wear sanitary pads. Do not douche or use tampons until your doctor says it is okay. ? · Ask your doctor when you can drive again. ? · You will probably need to take at least 6 weeks off work. It depends on the type of work you do and how you feel. ? · Ask your doctor when it is okay for you to have sex. Diet ? · You can eat your normal diet. If your stomach is upset, try bland, low-fat foods like plain rice, broiled chicken, toast, and yogurt. ? · Drink plenty of fluids (unless your doctor tells you not to).   
? · You may notice that your bowel movements are not regular right after your surgery. This is common. Try to avoid constipation and straining with bowel movements. You may want to take a fiber supplement every day. If you have not had a bowel movement after a couple of days, ask your doctor about taking a mild laxative. ? · If you are breastfeeding, do not drink any alcohol. Medicines ? · Your doctor will tell you if and when you can restart your medicines. He or she will also give you instructions about taking any new medicines. ? · If you take blood thinners, such as warfarin (Coumadin), clopidogrel (Plavix), or aspirin, be sure to talk to your doctor. He or she will tell you if and when to start taking those medicines again. Make sure that you understand exactly what your doctor wants you to do. ? · Take pain medicines exactly as directed. ¨ If the doctor gave you a prescription medicine for pain, take it as prescribed. ¨ If you are not taking a prescription pain medicine, ask your doctor if you can take an over-the-counter medicine. ? · If you think your pain medicine is making you sick to your stomach: 
¨ Take your medicine after meals (unless your doctor has told you not to). ¨ Ask your doctor for a different pain medicine. ? · If your doctor prescribed antibiotics, take them as directed. Do not stop taking them just because you feel better. You need to take the full course of antibiotics. Incision care ? · If you have strips of tape on the incision, leave the tape on for a week or until it falls off.  
? · Wash the area daily with warm, soapy water, and pat it dry. Don't use hydrogen peroxide or alcohol, which can slow healing. You may cover the area with a gauze bandage if it weeps or rubs against clothing. Change the bandage every day. ? · Keep the area clean and dry. Other instructions ?  · If you breastfeed your baby, you may be more comfortable while you are healing if you place the baby so that he or she is not resting on your belly. Try tucking your baby under your arm, with his or her body along the side you will be feeding on. Support your baby's upper body with your arm. With that hand you can control your baby's head to bring his or her mouth to your breast. This is sometimes called the football hold. Follow-up care is a key part of your treatment and safety. Be sure to make and go to all appointments, and call your doctor if you are having problems. It's also a good idea to know your test results and keep a list of the medicines you take. When should you call for help? Call 911 anytime you think you may need emergency care. For example, call if: 
? · You passed out (lost consciousness). ? · You have chest pain, are short of breath, or cough up blood. ?Call your doctor now or seek immediate medical care if: 
? · You have pain that does not get better after you take pain medicine. ? · You have severe vaginal bleeding. ? · You are dizzy or lightheaded, or you feel like you may faint. ? · You have new or worse pain in your belly or pelvis. ? · You have loose stitches, or your incision comes open. ? · You have symptoms of infection, such as: 
¨ Increased pain, swelling, warmth, or redness. ¨ Red streaks leading from the incision. ¨ Pus draining from the incision. ¨ A fever. ? · You have symptoms of a blood clot in your leg (called a deep vein thrombosis), such as: 
¨ Pain in your calf, back of the knee, thigh, or groin. ¨ Redness and swelling in your leg or groin. ? Watch closely for changes in your health, and be sure to contact your doctor if: 
? · You do not get better as expected. Where can you learn more? Go to http://mary kay-yung.info/. Enter M806 in the search box to learn more about \" Section: What to Expect at Home. \" Current as of: 2017 Content Version: 11.4 © 8386-0817 Healthwise, Incorporated.  Care instructions adapted under license by [address] (which disclaims liability or warranty for this information). If you have questions about a medical condition or this instruction, always ask your healthcare professional. [address] any warranty or liability for your use of this information. Learning About Starting to Breastfeed Planning ahead Before your baby is born, plan ahead. Learn all you can about breastfeeding. This helps make breastfeeding easier. · Early in your pregnancy, talk to your doctor or midwife about breastfeeding. · Learn the basics before your baby is born. The staff at hospitals and birthing centers can help you find a lactation specialist. This person is often a nurse who has been trained to teach and advise women about breastfeeding. Or you can take a breastfeeding class. · Plan ahead for times when you will need help after your baby is born. Many women get help from friends and family. Some join a support group to talk to other moms who breastfeed. · Buy the equipment you'll need. Examples are breast pads, nipple cream, extra pillows, and nursing bras. Find out about breast pumps too. Getting help from your hospital or birthing center It's important to have support from the doctors, nurses, and hospital staff who care for you and your baby. Before it's time for you to give birth, ask about the breastfeeding policies at your hospital or birthing center. Look for a hospital or birthing center that has policies for: · \"Rooming in. \" This policy encourages you to have your baby in the room with you. It can allow you to breastfeed more often. · Supplemental feedings. Tell the staff that your baby is to get only your breast milk from birth. If staff feed your baby water, sugar solution, or formula right after birth without a medical reason, it may make it harder for you to breastfeed. · Pacifiers or artificial nipples.  Staff should not give your  these items without your permission. They may interfere with breastfeeding. · Follow-up. Find out if your hospital can help you with breastfeeding issues after you go home. See if you can get information on support groups or other contacts. They might help if you need help setting up and staying with your breastfeeding routine. Your first feeding It's best to start breastfeeding within 1 hour of birth. For each feeding, you go through these basic steps: · Get ready for the feeding. Be calm and relaxed, and try not to be distracted. Get some water or juice for yourself. Use two or three pillows to help support your baby while he or she is nursing. · Find a breastfeeding position that is comfortable for you and your baby. Examples are the cradle and the football positions. Make sure the baby's head and chest are lined up straight and facing your breast. It's best to switch which breast you start with each time. · Get the baby latched on well. Your baby's mouth needs to be wide open, like a yawn, so you may need to gently touch the middle of your baby's lower lip. When your baby's mouth is open wide, quickly bring the baby onto your nipple and areola. The areola is the dark [place] around your nipple. · Provide a complete feeding. Let your baby nurse for at least 15 minutes. Be sure to burp your baby after each breast. 
In the first days after birth, your breasts make a thick, yellow liquid called colostrum. This liquid gives your baby nutrients and antibodies against infection. It is all that babies need at first. Your breasts will fill with milk a few days after the birth. Talk to your doctor, midwife, or lactation specialist right away if you are having problems and aren't sure what to do. How often to breastfeed Plan to breastfeed your baby on demand rather than setting a strict schedule.  For the first few days, be prepared to breastfeed every 1 to 3 hours. That often works out to about 8 to 12 times in a 24-hour period. Wake a sleeping baby to feed, if you need to. If you breastfeed more often, it will help your breasts to produce more milk. After you go home After you're home, don't be afraid to call your doctor, midwife, or lactation specialist with questions. That's true even if you don't know what's bothering you. They are used to parents of newborns calling. They can help you figure out if there is a problem, and if so, how to fix it. Plan for times when you will be apart from your baby. Use a breast pump to collect breast milk ahead of time. You can store milk in the refrigerator or freezer. Then it's ready when someone else will be taking care of your baby. Breastfeeding is a learned skill that gets easier over time. You are more likely to succeed if you plan ahead, learn the basic techniques, and know where to get help and support. Where can you learn more? Go to http://mary kay-yung.info/. Enter A652 in the search box to learn more about \"Learning About Starting to Breastfeed. \" Current as of: March 16, 2017 Content Version: 11.4 © 7419-2489 Healthwise, [employer]. Care instructions adapted under license by [employer] (which disclaims liability or warranty for this information). If you have questions about a medical condition or this instruction, always ask your healthcare professional. [place] [address] any warranty or liability for your use of this information. PAK Announcement We are excited to announce that we are making your provider's discharge notes available to you in PAK. You will see these notes when they are completed and signed by the physician that discharged you from your recent hospital stay.   If you have any questions or concerns about any information you see in PAK, please call the [employer] Department where you were seen or reach out to your Primary Care Provider for more information about your plan of care. Introducing [facility] & HEALTH SERVICES! Dear [place]: Thank you for requesting a My Fashion Database account. Our records indicate that you already have an active My Fashion Database account. You can access your account anytime at [email or web address]). [employer]) Did you know that you can access your hospital and ER discharge instructions at any time in My Fashion Database? You can also review all of your test results from your hospital stay or ER visit. Additional Information If you have questions, please visit the Frequently Asked Questions section of the My Fashion Database website at [email or web address])/. Remember, My Fashion Database is NOT to be used for urgent needs. For medical emergencies, dial 911. Now available from your iPhone and Android! Introducing Karsten Ceballos As a [facility] patient, I wanted to make you aware of our electronic visit tool called Karsten Jaswindertysonanita. Fourne[employer] Chilo 24/7 allows you to connect within minutes with a medical provider 24 hours a day, seven days a week via a mobile device or tablet or logging into a secure website from your computer. You can access Karsten Lin from anywhere in the [place]. A virtual visit might be right for you when you have a simple condition and feel like you just dont want to get out of bed, or cant get away from work for an appointment, when your regular [facility] provider is not available (evenings, weekends or holidays), or when youre out of town and need minor care. Electronic visits cost only $49 and if the [facility] 24/7 provider determines a prescription is needed to treat your condition, one can be electronically transmitted to a nearby pharmacy*. Please take a moment to enroll today if you have not already done so. The enrollment process is free and takes just a few minutes.   To enroll, please download the [employer] 24/7 lauro to your tablet or phone, or visit [email or web address] to enroll on your computer. And, as an [address] [place] [address] patient with a [employer] account, the results of your visits will be scanned into your electronic medical record and your primary care provider will be able to view the scanned results. We urge you to continue to see your regular [facility] provider for your ongoing medical care. And while your primary care provider may not be the one available when you seek a [employer] virtual visit, the peace of mind you get from getting a real diagnosis real time can be priceless. For more information on [employer], view our Frequently Asked Questions (FAQs) at [email or web address]. Sincerely, 
 
Ye Serrato MD 
Chief Medical Officer [facility] Lucia Ribeiro *:  certain medications cannot be prescribed via [employer] Providers Seen During Your Hospitalization Provider Specialty Primary office phone Christie Navarro MD Family Practice [phone] Camila Kyle MD Obstetrics & Gynecology [phone] Your Primary Care Physician (PCP) Primary Care Physician Office Phone Office Fax Willa Lin [phone] You are allergic to the following Allergen Reactions Ceclor (Cefaclor) Hives Penicillin G Other (comments) Abdominal pain Recent Documentation Height Weight Breastfeeding? BMI OB Status Smoking Status 1.651 m 106.6 kg Unknown 39.11 kg/m2 Recent pregnancy Former Smoker Emergency Contacts Name Discharge Info Relation Home Work Mobile [address] [facility] CAREGIVER [3] Parent [1] [phone] Obdulia [place]  Parent [1] [phone] Jermaine,Will DISCHARGE CAREGIVER [3] Spouse [3] [phone] Patient Belongings The following personal items are in your possession at time of discharge: Dental Appliances: None  Visual Aid: None      Home Medications: None   Jewelry: None  Clothing: At bedside    Other Valuables: Cell Phone, Purse  Personal Items Sent to Safe: none Please provide this summary of care documentation to your next provider. Signatures-by signing, you are acknowledging that this After Visit Summary has been reviewed with you and you have received a copy. Patient Signature:  ____________________________________________________________ Date:  ____________________________________________________________  
  
[facility] Provider Signature:  ____________________________________________________________ Date:  ____________________________________________________________

## 2018-06-02 LAB
BASOPHILS # BLD: 0 K/UL (ref 0–0.1)
BASOPHILS NFR BLD: 0 % (ref 0–1)
DIFFERENTIAL METHOD BLD: ABNORMAL
EOSINOPHIL # BLD: 0.2 K/UL (ref 0–0.4)
EOSINOPHIL NFR BLD: 1 % (ref 0–7)
ERYTHROCYTE [DISTWIDTH] IN BLOOD BY AUTOMATED COUNT: 14.6 % (ref 11.5–14.5)
HCT VFR BLD AUTO: 30.4 % (ref 35–47)
HGB BLD-MCNC: 9.7 G/DL (ref 11.5–16)
IMM GRANULOCYTES # BLD: 0.1 K/UL (ref 0–0.04)
IMM GRANULOCYTES NFR BLD AUTO: 1 % (ref 0–0.5)
LYMPHOCYTES # BLD: 1.8 K/UL (ref 0.8–3.5)
LYMPHOCYTES NFR BLD: 15 % (ref 12–49)
MCH RBC QN AUTO: 29.6 PG (ref 26–34)
MCHC RBC AUTO-ENTMCNC: 31.9 G/DL (ref 30–36.5)
MCV RBC AUTO: 92.7 FL (ref 80–99)
MONOCYTES # BLD: 1 K/UL (ref 0–1)
MONOCYTES NFR BLD: 8 % (ref 5–13)
NEUTS SEG # BLD: 8.6 K/UL (ref 1.8–8)
NEUTS SEG NFR BLD: 74 % (ref 32–75)
NRBC # BLD: 0 K/UL (ref 0–0.01)
NRBC BLD-RTO: 0 PER 100 WBC
PLATELET # BLD AUTO: 251 K/UL (ref 150–400)
PMV BLD AUTO: 10 FL (ref 8.9–12.9)
RBC # BLD AUTO: 3.28 M/UL (ref 3.8–5.2)
WBC # BLD AUTO: 11.6 K/UL (ref 3.6–11)

## 2018-06-02 PROCEDURE — 85025 COMPLETE CBC W/AUTO DIFF WBC: CPT | Performed by: OBSTETRICS & GYNECOLOGY

## 2018-06-02 PROCEDURE — 94762 N-INVAS EAR/PLS OXIMTRY CONT: CPT

## 2018-06-02 PROCEDURE — 74011250637 HC RX REV CODE- 250/637: Performed by: FAMILY MEDICINE

## 2018-06-02 PROCEDURE — 36415 COLL VENOUS BLD VENIPUNCTURE: CPT | Performed by: OBSTETRICS & GYNECOLOGY

## 2018-06-02 PROCEDURE — 65270000029 HC RM PRIVATE

## 2018-06-02 PROCEDURE — 74011250637 HC RX REV CODE- 250/637: Performed by: STUDENT IN AN ORGANIZED HEALTH CARE EDUCATION/TRAINING PROGRAM

## 2018-06-02 RX ORDER — LANOLIN ALCOHOL/MO/W.PET/CERES
1 CREAM (GRAM) TOPICAL 2 TIMES DAILY
Status: DISCONTINUED | OUTPATIENT
Start: 2018-06-02 | End: 2018-06-03 | Stop reason: HOSPADM

## 2018-06-02 RX ADMIN — HYDROCODONE BITARTRATE AND ACETAMINOPHEN 1 TABLET: 5; 325 TABLET ORAL at 20:45

## 2018-06-02 RX ADMIN — FERROUS SULFATE TAB 325 MG (65 MG ELEMENTAL FE) 325 MG: 325 (65 FE) TAB at 18:27

## 2018-06-02 RX ADMIN — DOCUSATE SODIUM 100 MG: 100 CAPSULE, LIQUID FILLED ORAL at 08:17

## 2018-06-02 RX ADMIN — IBUPROFEN 800 MG: 800 TABLET ORAL at 14:24

## 2018-06-02 RX ADMIN — DOCUSATE SODIUM 100 MG: 100 CAPSULE, LIQUID FILLED ORAL at 18:26

## 2018-06-02 RX ADMIN — HYDROCODONE BITARTRATE AND ACETAMINOPHEN 1 TABLET: 5; 325 TABLET ORAL at 16:24

## 2018-06-02 RX ADMIN — IBUPROFEN 800 MG: 800 TABLET ORAL at 05:59

## 2018-06-02 RX ADMIN — HYDROCODONE BITARTRATE AND ACETAMINOPHEN 1 TABLET: 5; 325 TABLET ORAL at 12:35

## 2018-06-02 RX ADMIN — FERROUS SULFATE TAB 325 MG (65 MG ELEMENTAL FE) 325 MG: 325 (65 FE) TAB at 08:17

## 2018-06-02 RX ADMIN — IBUPROFEN 800 MG: 800 TABLET ORAL at 22:15

## 2018-06-02 NOTE — PROGRESS NOTES
Post-Operative Day Number 1 Progress Note    Patient is post-op day 1 from  delivery without significant complaints. Pain well controlled. Lochia minimum. Tolerating  diet. Ambulating. Medrano out, no difficulty with urination. + flatus. Visit Vitals    /67 (BP 1 Location: Left arm, BP Patient Position: At rest)    Pulse 85    Temp 98.1 °F (36.7 °C)    Resp 16    Ht 5' 5\" (1.651 m)    Wt 106.6 kg (235 lb)    LMP 2017 (Approximate)    SpO2 96%    Breastfeeding Unknown    BMI 39.11 kg/m2         Intake and Output:       Intake/Output Summary (Last 24 hours) at 18 0834  Last data filed at 18 0350   Gross per 24 hour   Intake          1797.92 ml   Output             5000 ml   Net         -3202.08 ml         Exam:   Patient without distress               CTAB, no w/r/r/c    RR, + S1 and S2, no m/r/g    Abdomen soft, fundus firm and 1 the umbilicus. Incision c/d/i, appropriately tender               Lower extremities negative for swelling, no cords or tenderness, SCDs in place    Lab/Data Review:    Lab Results   Component Value Date/Time    WBC 11.6 (H) 2018 04:23 AM    HGB 9.7 (L) 2018 04:23 AM    HCT 30.4 (L) 2018 04:23 AM    PLATELET 790  04:23 AM    MCV 92.7 2018 04:23 AM    Hgb, External 11.1 2018    Hgb, External 11.1 2018    Hct, External 33.7 2018    Hct, External 33.7 2018    Platelet cnt., External 301 2018         Immunization History   Administered Date(s) Administered    Influenza Vaccine 10/03/2017    MMR Vaccine 2011    Tdap 2010, 2018       Assessment and Plan:    Jake Jett is a 41 y.o. [id number] s/p repeat LTCS at 39 weeks 0 days. Post- course is uncomplicated. 1. Continue routine post-op care and maternal education. 2. Anemia: Hb: 9.7 : continue iron and colace  3. Encourage ambulation. SCDs on if in bed. Medrano is out. 4. tobaco abuse: trying to quit. Counseled on dangers of smoking. Patient to be discussed with Dr. Jonathan Obando. Michelle Lyon MD  Family Medicine Resident

## 2018-06-02 NOTE — PROGRESS NOTES
Bedside and Verbal shift change report given to Justin Út 67. (oncoming nurse) by Romy Arita RNC (offgoing nurse). Report included the following information SBAR, Kardex, Intake/Output, MAR and Recent Results.

## 2018-06-02 NOTE — PROGRESS NOTES
Post-Operative  Day 1    Marivel Dean     Assessment: Post-Op day 1, stable    Plan:   1. Routine post-operative care       Information for the patient's :  Paulette Eastman, Male [[id number]]   , Low Transverse   Patient doing well without significant complaint. Nausea and vomiting resolved, tolerating liquids, with flatus, leslie to be removed    Vitals:  Visit Vitals    /67 (BP 1 Location: Left arm, BP Patient Position: At rest)    Pulse 85    Temp 98.1 °F (36.7 °C)    Resp 16    Ht 5' 5\" (1.651 m)    Wt 106.6 kg (235 lb)    LMP 2017 (Approximate)    SpO2 96%    Breastfeeding Unknown    BMI 39.11 kg/m2     Temp (24hrs), Av.6 °F (32.6 °C), Min:32 °F (0 °C), Max:98.5 °F (36.9 °C)      Last 24hr Input/Output:    Intake/Output Summary (Last 24 hours) at 18 0947  Last data filed at 18 0350   Gross per 24 hour   Intake          1797.92 ml   Output             5000 ml   Net         -3202.08 ml          Exam:        Patient without distress. Lungs clear. Abdomen, bowel sounds present, soft, expected tenderness, fundus firm  Incision clean dry intact     Perineum normal lochia noted               Lower extremities are negative for swelling, cords or tenderness.     Labs:   Lab Results   Component Value Date/Time    WBC 11.6 (H) 2018 04:23 AM    HGB 9.7 (L) 2018 04:23 AM    HCT 30.4 (L) 2018 04:23 AM    PLATELET 576  04:23 AM    Hgb, External 11.1 2018    Hgb, External 11.1 2018    Hct, External 33.7 2018    Hct, External 33.7 2018    Platelet cnt., External 301 2018

## 2018-06-02 NOTE — ROUTINE PROCESS
Bedside and Verbal shift change report given to Sofy Sal RN (oncoming nurse) by Anna Calloway RN (offgoing nurse). Report included the following information SBAR, Kardex, Intake/Output, MAR and Accordion.

## 2018-06-02 NOTE — LACTATION NOTE
This note was copied from a baby's chart. Pt will successfully establish breastfeeding by feeding in response to early feeding cues   or wake every 3h, will obtain deep latch, and will keep log of feedings/output. Taught to BF at hunger cues and or q 2-3 hrs and to offer 10-20 drops of hand expressed colostrum at any non-feeds. Breast Assessment  Left Breast: Medium  Left Nipple: Everted, Intact  Right Breast: Medium  Right Nipple: Everted, Intact  Breast- Feeding Assessment  Attends Breast-Feeding Classes: No  Breast-Feeding Experience:  (3 weeks with first)  Breast Trauma/Surgery: Yes (cyst removed under right breast)  Type/Quality: Good (with nipple shield)  Lactation Consultant Visits  Breast-Feedings: Good   Mother/Infant Observation  Mother Observation: Alignment, Lets baby end feeding, Sleepy after feeding, Nipple round on release, Breast comfortable, Recognizes feeding cues, Holds breast  Infant Observation: Feeding cues, Lips flanged, lower, Relaxed after feeding, Opens mouth, Latches nipple and aereolae, Frenulum checked, Lips flanged, upper, Rhythmic suck, Audible swallows  LATCH Documentation  Latch: Grasps breast, tongue down, lips flanged, rhythmic sucking  Audible Swallowing: Spontaneous and intermittent (24 hours old)  Type of Nipple: Everted (after stimulation)  Comfort (Breast/Nipple): Filling, red/small blisters/bruises, mild/mod discomfort  Hold (Positioning): No assist from staff, mother able to position/hold infant  LATCH Score: 9  Biological Nurturing breastfeeding principles taught. How Biological Nurturing (BN)  promotes optimal breastfeeding (BF) sessions discussed. Mother encouraged to seek comfortable semi-reclining breastfeeding positions. Infant placed frontally along maternal contour. Primitive innate feeding reflexes/behaviors of the  discussed. BN tips and techniques shared; assisted with comfortable breastfeeding positioning.           Care for sore/tender nipples discussed:  ways to improve positioning and latch practiced and discussed, hand express colostrum after feedings and let air dry, light application of lanolin, hydrogel pads, seek comfortable laid back feeding position, start feedings on least sore side first.    Mom states\"my nipples are tender. \"  Gave Mom gel pads and showed her how to use them. Mom using shield, shield full of drops of colostrum. Mom tearful and anxious. Suggested to Mom that she stay until Monday if she is concerned about her  helping out. Asked if Mom had considered a , \"[place]. I do have family near by who can help. \"

## 2018-06-03 VITALS
RESPIRATION RATE: 16 BRPM | WEIGHT: 235 LBS | OXYGEN SATURATION: 96 % | DIASTOLIC BLOOD PRESSURE: 81 MMHG | TEMPERATURE: 97.9 F | HEART RATE: 87 BPM | BODY MASS INDEX: 39.15 KG/M2 | HEIGHT: 65 IN | SYSTOLIC BLOOD PRESSURE: 134 MMHG

## 2018-06-03 PROCEDURE — 74011250637 HC RX REV CODE- 250/637: Performed by: STUDENT IN AN ORGANIZED HEALTH CARE EDUCATION/TRAINING PROGRAM

## 2018-06-03 PROCEDURE — 74011250637 HC RX REV CODE- 250/637: Performed by: FAMILY MEDICINE

## 2018-06-03 PROCEDURE — 74011250637 HC RX REV CODE- 250/637: Performed by: OBSTETRICS & GYNECOLOGY

## 2018-06-03 RX ORDER — SODIUM CHLORIDE, SODIUM LACTATE, POTASSIUM CHLORIDE, CALCIUM CHLORIDE 600; 310; 30; 20 MG/100ML; MG/100ML; MG/100ML; MG/100ML
125 INJECTION, SOLUTION INTRAVENOUS CONTINUOUS
Status: DISCONTINUED | OUTPATIENT
Start: 2018-06-03 | End: 2018-06-03 | Stop reason: HOSPADM

## 2018-06-03 RX ORDER — IBUPROFEN 800 MG/1
800 TABLET ORAL
Qty: 30 TAB | Refills: 0 | Status: SHIPPED | OUTPATIENT
Start: 2018-06-03 | End: 2018-06-03

## 2018-06-03 RX ORDER — ADHESIVE BANDAGE
30 BANDAGE TOPICAL DAILY PRN
Status: DISCONTINUED | OUTPATIENT
Start: 2018-06-03 | End: 2018-06-03 | Stop reason: HOSPADM

## 2018-06-03 RX ORDER — ZOLPIDEM TARTRATE 5 MG/1
5 TABLET ORAL
Status: DISCONTINUED | OUTPATIENT
Start: 2018-06-03 | End: 2018-06-03

## 2018-06-03 RX ORDER — HYDROCODONE BITARTRATE AND ACETAMINOPHEN 10; 325 MG/1; MG/1
1 TABLET ORAL
Status: DISCONTINUED | OUTPATIENT
Start: 2018-06-03 | End: 2018-06-03 | Stop reason: HOSPADM

## 2018-06-03 RX ORDER — SWAB
1 SWAB, NON-MEDICATED MISCELLANEOUS DAILY
Status: DISCONTINUED | OUTPATIENT
Start: 2018-06-03 | End: 2018-06-03 | Stop reason: HOSPADM

## 2018-06-03 RX ORDER — HYDROCODONE BITARTRATE AND ACETAMINOPHEN 5; 325 MG/1; MG/1
1 TABLET ORAL
Qty: 14 TAB | Refills: 0 | Status: SHIPPED | OUTPATIENT
Start: 2018-06-03 | End: 2018-07-25

## 2018-06-03 RX ORDER — OXYTOCIN/RINGER'S LACTATE 20/1000 ML
125-500 PLASTIC BAG, INJECTION (ML) INTRAVENOUS ONCE
Status: DISCONTINUED | OUTPATIENT
Start: 2018-06-03 | End: 2018-06-03 | Stop reason: HOSPADM

## 2018-06-03 RX ORDER — DOCUSATE SODIUM 100 MG/1
100 CAPSULE, LIQUID FILLED ORAL 2 TIMES DAILY
Qty: 60 CAP | Refills: 2 | Status: SHIPPED | OUTPATIENT
Start: 2018-06-03 | End: 2018-07-25

## 2018-06-03 RX ORDER — SIMETHICONE 80 MG
80 TABLET,CHEWABLE ORAL AS NEEDED
Status: DISCONTINUED | OUTPATIENT
Start: 2018-06-03 | End: 2018-06-03 | Stop reason: HOSPADM

## 2018-06-03 RX ORDER — SODIUM CHLORIDE 0.9 % (FLUSH) 0.9 %
5-10 SYRINGE (ML) INJECTION AS NEEDED
Status: DISCONTINUED | OUTPATIENT
Start: 2018-06-03 | End: 2018-06-03 | Stop reason: HOSPADM

## 2018-06-03 RX ORDER — SODIUM CHLORIDE 0.9 % (FLUSH) 0.9 %
5-10 SYRINGE (ML) INJECTION EVERY 8 HOURS
Status: DISCONTINUED | OUTPATIENT
Start: 2018-06-03 | End: 2018-06-03 | Stop reason: HOSPADM

## 2018-06-03 RX ORDER — IBUPROFEN 800 MG/1
800 TABLET ORAL
Qty: 30 TAB | Refills: 0 | Status: SHIPPED | OUTPATIENT
Start: 2018-06-03 | End: 2018-07-25

## 2018-06-03 RX ORDER — HYDROMORPHONE HYDROCHLORIDE 2 MG/ML
1 INJECTION, SOLUTION INTRAMUSCULAR; INTRAVENOUS; SUBCUTANEOUS
Status: DISCONTINUED | OUTPATIENT
Start: 2018-06-03 | End: 2018-06-03 | Stop reason: HOSPADM

## 2018-06-03 RX ORDER — IBUPROFEN 800 MG/1
800 TABLET ORAL EVERY 8 HOURS
Status: DISCONTINUED | OUTPATIENT
Start: 2018-06-03 | End: 2018-06-03

## 2018-06-03 RX ORDER — ACETAMINOPHEN 325 MG/1
650 TABLET ORAL
Status: DISCONTINUED | OUTPATIENT
Start: 2018-06-03 | End: 2018-06-03 | Stop reason: HOSPADM

## 2018-06-03 RX ORDER — NALOXONE HYDROCHLORIDE 0.4 MG/ML
0.4 INJECTION, SOLUTION INTRAMUSCULAR; INTRAVENOUS; SUBCUTANEOUS AS NEEDED
Status: DISCONTINUED | OUTPATIENT
Start: 2018-06-03 | End: 2018-06-03

## 2018-06-03 RX ORDER — DIPHENHYDRAMINE HCL 25 MG
25 CAPSULE ORAL
Status: DISCONTINUED | OUTPATIENT
Start: 2018-06-03 | End: 2018-06-03 | Stop reason: HOSPADM

## 2018-06-03 RX ADMIN — FERROUS SULFATE TAB 325 MG (65 MG ELEMENTAL FE) 325 MG: 325 (65 FE) TAB at 13:02

## 2018-06-03 RX ADMIN — HYDROCODONE BITARTRATE AND ACETAMINOPHEN 1 TABLET: 10; 325 TABLET ORAL at 13:02

## 2018-06-03 RX ADMIN — HYDROCODONE BITARTRATE AND ACETAMINOPHEN 1 TABLET: 5; 325 TABLET ORAL at 06:30

## 2018-06-03 RX ADMIN — IBUPROFEN 800 MG: 800 TABLET ORAL at 06:26

## 2018-06-03 RX ADMIN — DOCUSATE SODIUM 100 MG: 100 CAPSULE, LIQUID FILLED ORAL at 13:02

## 2018-06-03 RX ADMIN — HYDROCODONE BITARTRATE AND ACETAMINOPHEN 1 TABLET: 5; 325 TABLET ORAL at 01:01

## 2018-06-03 NOTE — PROGRESS NOTES
Pt off unit in stable condition via wheelchair with volunteers for discharge home per Dr. Uribe. Pt is to follow-up in 2 weeks and is aware. Prescriptions given to pt. Pt. Denies any HA, dizziness, NV, or pain at this time. Infant in car seat with mother.

## 2018-06-03 NOTE — ROUTINE PROCESS
Bedside and Verbal shift change report given to Yonathan Garcia (oncoming nurse) by Zoran Berman RN (offgoing nurse). Report included the following information SBAR, Intake/Output, MAR and Recent Results.

## 2018-06-03 NOTE — PROGRESS NOTES
Post-Operative Day Number 2 Progress Note    Patient doing well post-op day 2 from  delivery without significant complaints. Pain well controlled. Lochia scant. Tolerating regular diet. Ambulating. Voiding without difficulty. Passing flatus. Has not had a bowel movement. Vitals:  Patient Vitals for the past 8 hrs:   BP Temp Pulse Resp   18 0439 134/81 97.9 °F (36.6 °C) 87 16     Temp (24hrs), Av.2 °F (36.8 °C), Min:97.9 °F (36.6 °C), Max:98.3 °F (36.8 °C)      Vital signs stable, afebrile. Exam:   Patient without distress               CTAB, no w/r/r/c    RRR, + S1 and S2, no m/r/g    Abdomen soft, fundus firm and at the umbilicus, non tender                 Incision c/d/i, appropriately tender               Lower extremities negative for swelling, no cords or tenderness    Lab/Data Review:  No results found for this or any previous visit (from the past 12 hour(s)). Assessment and Plan:    Leonel Goldstein is a 41 y.o.  s/p scheduled LTCS at 39w0d for repeat LTCS. Patient appears to be having uncomplicated post- course. 1. Continue routine post-op care and maternal education. 2. Plan to discharge today per patient request if no problems occur. 3. H/o tobacco use - patient reports cutting down but still actively smoking   4. Anemia - continue iron supplementation    Patient will be discussed with Dr. Thomas Ivy.     Linda Kowalski MD  Family Medicine Resident

## 2018-06-03 NOTE — DISCHARGE SUMMARY
Obstetrical Discharge Summary     Name: Dangelo Guzmán MRN: [id number]  SSN: [id number]    YOB: 1980  Age: 41 y.o. Sex: female      Admit Date: 2018    Discharge Date: 6/3/2018     Admitting Physician: Zac Conley MD     Attending Physician:  Zac Conley MD     Admission Diagnoses: Repeat  Normal pregnancy  Pregnancy    Discharge Diagnoses:   Information for the patient's :  Rajendra Remy, Male [[id number]]   Delivery of a 3.755 kg male infant via , Low Transverse on 2018 at 10:07 AM  by . Apgars were 9 and 9. Additional Diagnoses:   Hospital Problems  Date Reviewed: 2018          Codes Class Noted POA    Normal pregnancy ICD-10-CM: Z34.90  ICD-9-CM: V22.2  2018 Unknown        Pregnancy ICD-10-CM: Z34.90  ICD-9-CM: V22.2  2018 Unknown             Lab Results   Component Value Date/Time    Rubella, External 2.15, immune 10/26/2017       Immunization(s):   Immunization History   Administered Date(s) Administered    Influenza Vaccine 10/03/2017    MMR Vaccine 2011    Tdap 2010, 2018        Hospital Course: Patient is a 41 y.o. [id number] s/p repeat LTCS at 39 weeks 0 days.  Presented for scheduled repeat LTCS. Pregnancy complicated by anemia, AMA, smoking in pregnancy, and obesity. Labor was uncomplicated. Delivered TLFI by LTCS without complications.  Normal hospital course following the delivery. On day of discharge patient reported minimal lochia, well controlled pain, and no other complaints.  Discharged with pain regimen and bowel regimen. Advised to continue prenatal vitamins.  Follow up with PCP in 2 weeks. Condition at Discharge:  Stable    Patient Instructions:   Current Discharge Medication List      CONTINUE these medications which have NOT CHANGED    Details   ferrous sulfate (IRON) 325 mg (65 mg iron) tablet Take  by mouth Daily (before breakfast). cetirizine (ZYRTEC) 10 mg tablet Take 1 Tab by mouth daily.   Qty: 90 Tab, Refills: 1    Associated Diagnoses: Chronic seasonal allergic rhinitis due to pollen      PRENATAL VIT CALC,IRON,FOLIC (PRENATAL VITAMIN PO) Take  by mouth. Associated Diagnoses: Less than 8 weeks gestation of pregnancy      raNITIdine (ZANTAC) 75 mg tablet Take 1 Tab by mouth two (2) times a day. Qty: 30 Tab, Refills: 3    Associated Diagnoses: Gastroesophageal reflux disease without esophagitis             Reference my discharge instructions. No orders of the defined types were placed in this encounter. Signed by:  Reinaldo Cheung MD  Resident, PGY-1    Micaela 3, 2018   10:34 AM

## 2018-06-03 NOTE — DISCHARGE INSTRUCTIONS
Continue taking prenatal vitamin daily as well as iron twice a day with stool softener. Take ibuprofen every 8 hours as needed for pain. If pain is not responding to ibuprofen you make take Norco every 4 hours as needed. Follow-up with PCP in 2 weeks.  Section: What to Expect at [address] [place] [address]    A  section, or , is surgery to deliver your baby through a cut, called an incision, that the doctor makes in your lower belly and uterus. You may have some pain in your lower belly and need pain medicine for 1 to 2 weeks. You can expect some vaginal bleeding for several weeks. You will probably need about 6 weeks to fully recover. It is important to take it easy while the incision is healing. Avoid heavy lifting, strenuous activities, or exercises that strain the belly muscles while you are recovering. Ask a family member or friend for help with housework, cooking, and shopping. This care sheet gives you a general idea about how long it will take for you to recover. But each person recovers at a different pace. Follow the steps below to get better as quickly as possible. How can you care for yourself at home? Activity  ? · Rest when you feel tired. Getting enough sleep will help you recover. ? · Try to walk each day. Start by walking a little more than you did the day before. Bit by bit, increase the amount you walk. Walking boosts blood flow and helps prevent pneumonia, constipation, and blood clots. ? · Avoid strenuous activities, such as bicycle riding, jogging, weightlifting, and aerobic exercise, for 6 weeks or until your doctor says it is okay. ? · Until your doctor says it is okay, do not lift anything heavier than your baby. ? · Do not do sit-ups or other exercises that strain the belly muscles for 6 weeks or until your doctor says it is okay. ? · Hold a pillow over your incision when you cough or take deep breaths. This will support your belly and decrease your pain. ? · You may shower as usual. Pat the incision dry when you are done. ? · You will have some vaginal bleeding. Wear sanitary pads. Do not douche or use tampons until your doctor says it is okay. ? · Ask your doctor when you can drive again. ? · You will probably need to take at least 6 weeks off work. It depends on the type of work you do and how you feel. ? · Ask your doctor when it is okay for you to have sex. Diet  ? · You can eat your normal diet. If your stomach is upset, try bland, low-fat foods like plain rice, broiled chicken, toast, and yogurt. ? · Drink plenty of fluids (unless your doctor tells you not to). ? · You may notice that your bowel movements are not regular right after your surgery. This is common. Try to avoid constipation and straining with bowel movements. You may want to take a fiber supplement every day. If you have not had a bowel movement after a couple of days, ask your doctor about taking a mild laxative. ? · If you are breastfeeding, do not drink any alcohol. Medicines  ? · Your doctor will tell you if and when you can restart your medicines. He or she will also give you instructions about taking any new medicines. ? · If you take blood thinners, such as warfarin (Coumadin), clopidogrel (Plavix), or aspirin, be sure to talk to your doctor. He or she will tell you if and when to start taking those medicines again. Make sure that you understand exactly what your doctor wants you to do. ? · Take pain medicines exactly as directed. ¨ If the doctor gave you a prescription medicine for pain, take it as prescribed. ¨ If you are not taking a prescription pain medicine, ask your doctor if you can take an over-the-counter medicine. ? · If you think your pain medicine is making you sick to your stomach:  ¨ Take your medicine after meals (unless your doctor has told you not to). ¨ Ask your doctor for a different pain medicine.    ? · If your doctor prescribed antibiotics, take them as directed. Do not stop taking them just because you feel better. You need to take the full course of antibiotics. Incision care  ? · If you have strips of tape on the incision, leave the tape on for a week or until it falls off.   ? · Wash the area daily with warm, soapy water, and pat it dry. Don't use hydrogen peroxide or alcohol, which can slow healing. You may cover the area with a gauze bandage if it weeps or rubs against clothing. Change the bandage every day. ? · Keep the area clean and dry. Other instructions  ? · If you breastfeed your baby, you may be more comfortable while you are healing if you place the baby so that he or she is not resting on your belly. Try tucking your baby under your arm, with his or her body along the side you will be feeding on. Support your baby's upper body with your arm. With that hand you can control your baby's head to bring his or her mouth to your breast. This is sometimes called the football hold. Follow-up care is a key part of your treatment and safety. Be sure to make and go to all appointments, and call your doctor if you are having problems. It's also a good idea to know your test results and keep a list of the medicines you take. When should you call for help? Call 911 anytime you think you may need emergency care. For example, call if:  ? · You passed out (lost consciousness). ? · You have chest pain, are short of breath, or cough up blood. ?Call your doctor now or seek immediate medical care if:  ? · You have pain that does not get better after you take pain medicine. ? · You have severe vaginal bleeding. ? · You are dizzy or lightheaded, or you feel like you may faint. ? · You have new or worse pain in your belly or pelvis. ? · You have loose stitches, or your incision comes open. ? · You have symptoms of infection, such as:  ¨ Increased pain, swelling, warmth, or redness.   ¨ Red streaks leading from the incision. ¨ Pus draining from the incision. ¨ A fever. ? · You have symptoms of a blood clot in your leg (called a deep vein thrombosis), such as:  ¨ Pain in your calf, back of the knee, thigh, or groin. ¨ Redness and swelling in your leg or groin. ? Watch closely for changes in your health, and be sure to contact your doctor if:  ? · You do not get better as expected. Where can you learn more? Go to http://mary kay-yung.info/. Enter M806 in the search box to learn more about \" Section: What to Expect at Home. \"  Current as of: 2017  Content Version: 11.4  © 7767-3838 [employer]. Care instructions adapted under license by [employer] (which disclaims liability or warranty for this information). If you have questions about a medical condition or this instruction, always ask your healthcare professional. [place] [address] any warranty or liability for your use of this information. Learning About Starting to Breastfeed  Planning ahead    Before your baby is born, plan ahead. Learn all you can about breastfeeding. This helps make breastfeeding easier. · Early in your pregnancy, talk to your doctor or midwife about breastfeeding. · Learn the basics before your baby is born. The staff at hospitals and birthing centers can help you find a lactation specialist. This person is often a nurse who has been trained to teach and advise women about breastfeeding. Or you can take a breastfeeding class. · Plan ahead for times when you will need help after your baby is born. Many women get help from friends and family. Some join a support group to talk to other moms who breastfeed. · Buy the equipment you'll need. Examples are breast pads, nipple cream, extra pillows, and nursing bras. Find out about breast pumps too.   Getting help from your hospital or birthing center  It's important to have support from the doctors, nurses, and hospital staff who care for you and your baby. Before it's time for you to give birth, ask about the breastfeeding policies at your hospital or birthing center. Look for a hospital or birthing center that has policies for:  · \"Rooming in. \" This policy encourages you to have your baby in the room with you. It can allow you to breastfeed more often. · Supplemental feedings. Tell the staff that your baby is to get only your breast milk from birth. If staff feed your baby water, sugar solution, or formula right after birth without a medical reason, it may make it harder for you to breastfeed. · Pacifiers or artificial nipples. Staff should not give your  these items without your permission. They may interfere with breastfeeding. · Follow-up. Find out if your hospital can help you with breastfeeding issues after you go home. See if you can get information on support groups or other contacts. They might help if you need help setting up and staying with your breastfeeding routine. Your first feeding  It's best to start breastfeeding within 1 hour of birth. For each feeding, you go through these basic steps:  · Get ready for the feeding. Be calm and relaxed, and try not to be distracted. Get some water or juice for yourself. Use two or three pillows to help support your baby while he or she is nursing. · Find a breastfeeding position that is comfortable for you and your baby. Examples are the cradle and the football positions. Make sure the baby's head and chest are lined up straight and facing your breast. It's best to switch which breast you start with each time. · Get the baby latched on well. Your baby's mouth needs to be wide open, like a yawn, so you may need to gently touch the middle of your baby's lower lip. When your baby's mouth is open wide, quickly bring the baby onto your nipple and areola. The areola is the dark [place] around your nipple. · Provide a complete feeding.  Let your baby nurse for at least 15 minutes. Be sure to burp your baby after each breast.  In the first days after birth, your breasts make a thick, yellow liquid called colostrum. This liquid gives your baby nutrients and antibodies against infection. It is all that babies need at first. Your breasts will fill with milk a few days after the birth. Talk to your doctor, midwife, or lactation specialist right away if you are having problems and aren't sure what to do. How often to breastfeed  Plan to breastfeed your baby on demand rather than setting a strict schedule. For the first few days, be prepared to breastfeed every 1 to 3 hours. That often works out to about 8 to 12 times in a 24-hour period. Wake a sleeping baby to feed, if you need to. If you breastfeed more often, it will help your breasts to produce more milk. After you go home  After you're home, don't be afraid to call your doctor, midwife, or lactation specialist with questions. That's true even if you don't know what's bothering you. They are used to parents of newborns calling. They can help you figure out if there is a problem, and if so, how to fix it. Plan for times when you will be apart from your baby. Use a breast pump to collect breast milk ahead of time. You can store milk in the refrigerator or freezer. Then it's ready when someone else will be taking care of your baby. Breastfeeding is a learned skill that gets easier over time. You are more likely to succeed if you plan ahead, learn the basic techniques, and know where to get help and support. Where can you learn more? Go to http://mary kay-yung.info/. Enter P893 in the search box to learn more about \"Learning About Starting to Breastfeed. \"  Current as of: March 16, 2017  Content Version: 11.4  © 7304-5912 [employer]. Care instructions adapted under license by [employer] (which disclaims liability or warranty for this information).  If you have questions about a medical condition or this instruction, always ask your healthcare professional. [place] [address] any warranty or liability for your use of this information.

## 2018-06-18 ENCOUNTER — OFFICE VISIT (OUTPATIENT)
Dept: OBGYN CLINIC | Age: 38
End: 2018-06-18

## 2018-06-18 VITALS
SYSTOLIC BLOOD PRESSURE: 132 MMHG | BODY MASS INDEX: 34.05 KG/M2 | DIASTOLIC BLOOD PRESSURE: 78 MMHG | HEIGHT: 65 IN | WEIGHT: 204.38 LBS

## 2018-06-18 DIAGNOSIS — Z09 POSTOP CHECK: Primary | ICD-10-CM

## 2018-06-18 NOTE — PROGRESS NOTES
Postop  Evaluation  Valentine Paz is a 41 y.o. female returns for a routine post-operative follow-up visit after undergoing a  section which was done 2 weeks ago. She had the following pathology results: none sent. Since the patient's surgery, she has had typical postoperative discomfort but no significant symptoms or problems since the surgery. The patient's incision is healing well with no significant drainage. She states since the procedure, she has returned to most daily activities, ambulating, and not lifting or exercising. PHYSICAL EXAMINATION    Gastrointestinal  · Abdominal Examination: abdomen non-tender to palpation, incision intact and healing well, normal bowel sounds, no masses present  · Liver and spleen: no hepatomegaly present, spleen not palpable  · Hernias: no hernias identified    Skin  · General Inspection: no rash, no lesions identified    Neurologic/Psychiatric  · Mental Status:  · Orientation: grossly oriented to person, place and time  · Mood and Affect: mood normal, affect appropriate    Assessment:  Normal postop  checkup    Plan:  RTO as scheduled for PP checkup--4 weeks. Considering Mirena.

## 2018-07-24 ENCOUNTER — ROUTINE PRENATAL (OUTPATIENT)
Dept: FAMILY MEDICINE CLINIC | Age: 38
End: 2018-07-24

## 2018-07-24 DIAGNOSIS — Z72.0 TOBACCO ABUSE: ICD-10-CM

## 2018-07-24 DIAGNOSIS — D64.9 ANEMIA, UNSPECIFIED TYPE: ICD-10-CM

## 2018-07-24 NOTE — MR AVS SNAPSHOT
Jason Gay 
 
 
 2004 [address] [address] 
[phone] Patient: Amelia Junior MRN: [id number] CCO:[id number] Visit Information Date & Time Provider Department Dept. Phone Encounter #  
 2018  4:00 PM Suni Ma  [facility] [phone] [id number] Follow-up Instructions Return in about 2 weeks (around 2018) for IUD mirena placement . Follow-up and Disposition History Upcoming Health Maintenance Date Due Influenza Age 5 to Adult 2018 PAP AKA CERVICAL CYTOLOGY 2021 DTaP/Tdap/Td series (3 - Td) 3/8/2028 Allergies as of 2018  Review Complete On: 2018 By: Paola Gaitan Severity Noted Reaction Type Reactions Ceclor [Cefaclor]  2012    Hives Penicillin G  2012    Other (comments) Abdominal pain Current Immunizations  Reviewed on 2013 Name Date Influenza Vaccine 10/3/2017 MMR Vaccine 3/25/2011 Tdap 3/8/2018, 2010 Not reviewed this visit You Were Diagnosed With   
  
 Codes Comments Postpartum care following  delivery    -  Primary ICD-10-CM: Z39.2 ICD-9-CM: V24.2 Tobacco abuse     ICD-10-CM: Z72.0 ICD-9-CM: 305.1 Anemia, unspecified type     ICD-10-CM: D64.9 ICD-9-CM: 933. 9 Vitals BP Pulse Temp Resp Height(growth percentile) Weight(growth percentile) 115/71 (BP 1 Location: Right arm, BP Patient Position: Sitting) 81 98.6 °F (37 °C) (Oral) 18 5' 5\" (1.651 m) 204 lb 9.6 oz (92.8 kg) LMP SpO2 BMI OB Status Smoking Status 2017 (Approximate) 97% 34.05 kg/m2 Pregnant Former Smoker Vitals History BMI and BSA Data Body Mass Index Body Surface Area 34.05 kg/m 2 2.06 m 2 Preferred Pharmacy Pharmacy Name Phone [address] [place], [place] - [address] [phone] Your Updated Medication List  
  
   
 This list is accurate as of 7/24/18  4:27 PM.  Always use your most recent med list.  
  
  
  
  
 cetirizine 10 mg tablet Commonly known as:  ZYRTEC Take 1 Tab by mouth daily. docusate sodium 100 mg capsule Commonly known as:  Gevena Living Take 1 Cap by mouth two (2) times a day for 90 days. HYDROcodone-acetaminophen 5-325 mg per tablet Commonly known as:  Mookie Ill Take 1 Tab by mouth every four (4) hours as needed. Max Daily Amount: 6 Tabs. ibuprofen 800 mg tablet Commonly known as:  MOTRIN Take 1 Tab by mouth every eight (8) hours as needed for Pain. Iron 325 mg (65 mg iron) tablet Generic drug:  ferrous sulfate Take  by mouth Daily (before breakfast). PRENATAL VITAMIN PO Take  by mouth. raNITIdine 75 mg tablet Commonly known as:  ZANTAC Take 1 Tab by mouth two (2) times a day. We Performed the Following AMB POC HEMOGLOBIN (HGB) [42386 CPT(R)] Follow-up Instructions Return in about 2 weeks (around 8/7/2018) for IUD mirena placement . Introducing [facility]! Dear Aylin Cardenas: Thank you for requesting a [employer] account. Our records indicate that you already have an active [employer] account. You can access your account anytime at [email or web address] Did you know that you can access your hospital and ER discharge instructions at any time in [employer]? You can also review all of your test results from your hospital stay or ER visit. Additional Information If you have questions, please visit the Frequently Asked Questions section of the [employer] website at [email or web address] Remember, [employer] is NOT to be used for urgent needs. For medical emergencies, dial 911. Now available from your iPhone and Android! Please provide this summary of care documentation to your next provider. Your primary care clinician is listed as Umu Alcala  If you have any questions after today's visit, please call [phone].

## 2018-07-24 NOTE — PROGRESS NOTES
SUBJECTIVE: Anmol Morgan is a 41 y.o. female G2  is seen for a routine 6 week post partum check. She is 6 weeks post partum following a low cervical transverse  section at 39 weeks 0 days. I have fully reviewed the prenatal and intrapartum course. Anesthesia: Epidural.      Postpartum course has been uncomplicated. Tobacco Abuse: 1-3  cigs a day. Bleeding - no bleeding, previously experiencing light menses for the past 5 weeks       Bowel function is normal.     Bladder function is normal.     Patient is sexually active with . Pt's desired method of family planning: Awaiting Mirena. No history of HTN, DVT, CAD, DM, liver disease, migraines. Postpartum depression screening: score = 2         Depression Scale : 2       New onset depression:    9 Sx checklist. \"over the last 2 weeks how often have you had or been\"   (Answers:none=1, several days a week=2, more than half of the days=3, nearly every   day=4)    5-14 suspicious, >15 warrants medication and /or counseling)     Pleasure in doing things? 0   Feeling down or depressed? 0   Trouble sleeping?0   No energy or fatigue? 2   Poor appetite or overeating? 0   Feeling you have let others down or you are a failure? 0   Trouble concentrating? 0   Moving slowly or fidgety all the time? 0   Thinking you would be better off dead or thoughts of hurting yourself?0     How difficult has this made it for you to do your work, get along with others or   take care of things at work?0        Baby's course has been doing well without problems. Baby is feeding bottle - Enfamil. Reports no problems. Activity, diet and bowels are normal. Pain is minimal, complains of intermittent numbness/ tingling.   Review of Systems - General ROS: negative  Psychological ROS: negative for - anxiety, behavioral disorder, depression, hallucinations, hostility, mood swings, physical abuse or sexual abuse  Hematological and Lymphatic ROS: negative  Respiratory ROS: no cough, shortness of breath, or wheezing  Cardiovascular ROS: no chest pain or dyspnea on exertion  Gastrointestinal ROS: no abdominal pain, change in bowel habits, or black or bloody stools  Musculoskeletal ROS: negative for - gait disturbance, joint pain, joint stiffness or joint swelling  Neurological ROS: no TIA or stroke symptoms    Past Surgical History:   Procedure Laterality Date     DELIVERY ONLY      for hypertension and macrosomia    HX OTHER SURGICAL  2018    granuloma removed from under right breast     Allergies   Allergen Reactions    Ceclor [Cefaclor] Hives    Penicillin G Other (comments)     Abdominal pain       OBJECTIVE:   Blood pressure 115/71, pulse 81, temperature 98.6 °F (37 °C), temperature source Oral, resp. rate 18, height 5' 5\" (1.651 m), weight 204 lb 9.6 oz (92.8 kg), last menstrual period 2017, SpO2 97 %, not currently breastfeeding. Physical Exam:  GENERAL APPEARANCE: alert, well appearing, in no apparent distress  LUNGS: clear to auscultation, no wheezes, rales or rhonchi, symmetric air entry  HEART: regular rate and rhythm, no murmurs  ABDOMEN: soft, non-tender, +BS  EXTREMITIES: no redness or tenderness in the calves or thighs, no edema  NEUROLOGICAL: alert, oriented, normal speech, no focal findings or movement disorder noted  Wound: Surgical Incision c/d/i    ASSESSMENT / PLAN    ICD-10-CM ICD-9-CM    1. Postpartum care following  delivery Z39.2 V24.2    2. Tobacco abuse Z72.0 305.1    3. Anemia, unspecified type D64.9 285.9      Encounter Diagnoses   Name Primary?  Postpartum care following  delivery Yes    Tobacco abuse     Anemia, unspecified type      No orders of the defined types were placed in this encounter. Diagnoses and all orders for this visit:    1.  Postpartum care following  delivery   - Bottle feeding    - Lochia resolved   - Advised pt to take PNV    - Pt wants mirena for dysmenorrhea/family planning  2. Tobacco abuse   - Pt is down to 1 cig per day    - The patient was counseled on the dangers of tobacco use, and was advised to quit. Reviewed strategies to maximize success, including removing cigarettes and smoking materials from environment, stress management, substitution of other forms of reinforcement and support of family/friends. 3. Anemia, unspecified type   - Advised pt to take iron pills     Follow-up Disposition:  Return in about 2 weeks (around 8/7/2018) for IUD mirena placement .

## 2018-07-25 VITALS
HEART RATE: 81 BPM | WEIGHT: 204.6 LBS | HEIGHT: 65 IN | BODY MASS INDEX: 34.09 KG/M2 | TEMPERATURE: 98.6 F | SYSTOLIC BLOOD PRESSURE: 115 MMHG | RESPIRATION RATE: 18 BRPM | OXYGEN SATURATION: 97 % | DIASTOLIC BLOOD PRESSURE: 71 MMHG

## 2018-07-25 PROBLEM — Z34.90 PREGNANCY: Status: RESOLVED | Noted: 2018-06-01 | Resolved: 2018-07-25

## 2018-07-25 PROBLEM — Z34.90 NORMAL PREGNANCY: Status: RESOLVED | Noted: 2018-06-01 | Resolved: 2018-07-25

## 2018-07-25 PROBLEM — O09.523 ELDERLY MULTIGRAVIDA IN THIRD TRIMESTER: Status: RESOLVED | Noted: 2018-05-15 | Resolved: 2018-07-25

## 2018-07-31 ENCOUNTER — OFFICE VISIT (OUTPATIENT)
Dept: FAMILY MEDICINE CLINIC | Age: 38
End: 2018-07-31

## 2018-07-31 VITALS
TEMPERATURE: 97.9 F | DIASTOLIC BLOOD PRESSURE: 86 MMHG | HEIGHT: 65 IN | BODY MASS INDEX: 33.66 KG/M2 | WEIGHT: 202 LBS | HEART RATE: 78 BPM | RESPIRATION RATE: 16 BRPM | OXYGEN SATURATION: 98 % | SYSTOLIC BLOOD PRESSURE: 128 MMHG

## 2018-07-31 DIAGNOSIS — N94.6 DYSMENORRHEA: ICD-10-CM

## 2018-07-31 DIAGNOSIS — Z30.430 ENCOUNTER FOR IUD INSERTION: Primary | ICD-10-CM

## 2018-07-31 LAB
HCG URINE, QL. (POC): NEGATIVE
VALID INTERNAL CONTROL?: YES

## 2018-07-31 NOTE — PATIENT INSTRUCTIONS
A Healthy Lifestyle: Care Instructions Your Care Instructions A healthy lifestyle can help you feel good, stay at a healthy weight, and have plenty of energy for both work and play. A healthy lifestyle is something you can share with your whole family. A healthy lifestyle also can lower your risk for serious health problems, such as high blood pressure, heart disease, and diabetes. You can follow a few steps listed below to improve your health and the health of your family. Follow-up care is a key part of your treatment and safety. Be sure to make and go to all appointments, and call your doctor if you are having problems. It's also a good idea to know your test results and keep a list of the medicines you take. How can you care for yourself at home? · Do not eat too much sugar, fat, or fast foods. You can still have dessert and treats now and then. The goal is moderation. · Start small to improve your eating habits. Pay attention to portion sizes, drink less juice and soda pop, and eat more fruits and vegetables. ¨ Eat a healthy amount of food. A 3-ounce serving of meat, for example, is about the size of a deck of cards. Fill the rest of your plate with vegetables and whole grains. ¨ Limit the amount of soda and sports drinks you have every day. Drink more water when you are thirsty. ¨ Eat at least 5 servings of fruits and vegetables every day. It may seem like a lot, but it is not hard to reach this goal. A serving or helping is 1 piece of fruit, 1 cup of vegetables, or 2 cups of leafy, raw vegetables. Have an apple or some carrot sticks as an afternoon snack instead of a candy bar. Try to have fruits and/or vegetables at every meal. 
· Make exercise part of your daily routine. You may want to start with simple activities, such as walking, bicycling, or slow swimming. Try to be active 30 to 60 minutes every day. You do not need to do all 30 to 60 minutes all at once.  For example, you can exercise 3 times a day for 10 or 20 minutes. Moderate exercise is safe for most people, but it is always a good idea to talk to your doctor before starting an exercise program. 
· Keep moving. Negrita Rodriguezs the lawn, work in the garden, or [employer]. Take the stairs instead of the elevator at work. · If you smoke, quit. People who smoke have an increased risk for heart attack, stroke, cancer, and other lung illnesses. Quitting is hard, but there are ways to boost your chance of quitting tobacco for good. ¨ Use nicotine gum, patches, or lozenges. ¨ Ask your doctor about stop-smoking programs and medicines. ¨ Keep trying. In addition to reducing your risk of diseases in the future, you will notice some benefits soon after you stop using tobacco. If you have shortness of breath or asthma symptoms, they will likely get better within a few weeks after you quit. · Limit how much alcohol you drink. Moderate amounts of alcohol (up to 2 drinks a day for men, 1 drink a day for women) are okay. But drinking too much can lead to liver problems, high blood pressure, and other health problems. Family health If you have a family, there are many things you can do together to improve your health. · Eat meals together as a family as often as possible. · Eat healthy foods. This includes fruits, vegetables, lean meats and dairy, and whole grains. · Include your family in your fitness plan. Most people think of activities such as jogging or tennis as the way to fitness, but there are many ways you and your family can be more active. Anything that makes you breathe hard and gets your heart pumping is exercise. Here are some tips: 
¨ Walk to do errands or to take your child to school or the bus. ¨ Go for a family bike ride after dinner instead of watching TV. Where can you learn more? Go to http://mary kay-yung.info/. Enter Q887 in the search box to learn more about \"A Healthy Lifestyle: Care Instructions. \" Current as of: December 7, 2017 Content Version: 11.7 © 3110-6217 [employer]. Care instructions adapted under license by [employer] (which disclaims liability or warranty for this information). If you have questions about a medical condition or this instruction, always ask your healthcare professional. [address] any warranty or liability for your use of this information. Intrauterine Device (IUD) Insertion: Care Instructions Your Care Instructions The intrauterine device (IUD) is a very effective method of birth control. It is a small, plastic, T-shaped device that contains copper or hormones. The doctor inserts the IUD into your uterus. A plastic string tied to the end of the IUD hangs down through the cervix into the vagina. There are two types of IUDs. The copper IUD is effective for up to 10 years. The hormonal IUD is effective for either 3 years or 5 years, depending on which IUD is used. The hormonal IUD also reduces menstrual bleeding and cramping. Both types of IUD damage or kill the man's sperm. This means that the woman's egg does not join with the sperm. IUDs also change the lining of the uterus so that the egg does not lodge there. The IUD is most likely to work well for women who have been pregnant before. Some women who have never been pregnant have more trouble keeping the IUD in the uterus. They also may have more pain and cramping after insertion. Follow-up care is a key part of your treatment and safety. Be sure to make and go to all appointments, and call your doctor if you are having problems. It's also a good idea to know your test results and keep a list of the medicines you take. How can you care for yourself at home? · You may experience some mild cramping and light bleeding (spotting) for 1 or 2 days. Use a hot water bottle or a heating pad set on low on your belly for pain.  
· Take an over-the-counter pain medicine, such as acetaminophen (Tylenol), ibuprofen (Advil, Motrin), and naproxen (Aleve) if needed. Read and follow all instructions on the label. · Do not take two or more pain medicines at the same time unless the doctor told you to. Many pain medicines have acetaminophen, which is Tylenol. Too much acetaminophen (Tylenol) can be harmful. · Check the string of your IUD after every period. To do this, insert a finger into your vagina and feel for the cervix, which is at the top of the vagina and feels harder than the rest of your vagina. You should be able to feel the thin, plastic string coming out of the opening of your cervix. If you cannot feel the string, use another form of birth control and make an appointment with your doctor to have the string checked. · If the IUD comes out, save it and call your doctor. Be sure to use another form of birth control while the IUD is out. · Use latex condoms to protect against sexually transmitted infections (STIs), such as gonorrhea and chlamydia. An IUD does not protect you from STIs. Having one sex partner (who does not have STIs and does not have sex with anyone else) is a good way to avoid STIs. When should you call for help? Call 911 anytime you think you may need emergency care. For example, call if: 
  · You passed out (lost consciousness).  
  · You have sudden, severe pain in your belly or pelvis.  
 Call your doctor now or seek immediate medical care if: 
  · You have new belly or pelvic pain.  
  · You have severe vaginal bleeding.  This means that you are soaking through your usual pads or tampons each hour for 2 or more hours.  
  · You are dizzy or lightheaded, or you feel like you may faint.  
  · You have a fever and pelvic pain or vaginal discharge.  
  · You have pelvic pain that is getting worse.  
 Watch closely for changes in your health, and be sure to contact your doctor if: 
  · You cannot feel the string, or the IUD comes out.  
  · You feel sick to your stomach, or you vomit.  
  · You think you may be pregnant. Where can you learn more? Go to http://mary kay-yung.info/. Enter H921 in the search box to learn more about \"Intrauterine Device (IUD) Insertion: Care Instructions. \" Current as of: November 21, 2017 Content Version: 11.7 © 9205-9046 [employer]. Care instructions adapted under license by [employer] (which disclaims liability or warranty for this information). If you have questions about a medical condition or this instruction, always ask your healthcare professional. [address] any warranty or liability for your use of this information. Learning About Birth Control: Intrauterine Device (IUD) What is an intrauterine device (IUD)? The intrauterine device (IUD) is used to prevent pregnancy. It's a small, plastic, T-shaped device. Your doctor places the IUD in your uterus. You have a choice between a hormonal IUD and a copper IUD. The hormonal IUD can prevent pregnancy for 3 to 5 years, depending on which IUD is used. Once you have it, you don't have to do anything else to prevent pregnancy. The copper IUD can prevent pregnancy for 10 years. Once you have it, you don't have to do anything else to prevent pregnancy. A string tied to the end of the IUD hangs down through the opening of the uterus (called the cervix) into the vagina. You can check that the IUD is in place by feeling for the string. The IUD usually stays in the uterus until your doctor removes it. How well does it work? In the first year of use: · When the hormonal IUD is used exactly as directed, fewer than 1 woman out of 100 has an unplanned pregnancy. · When the copper IUD is used exactly as directed, fewer than 1 woman out of 100 has an unplanned pregnancy. Be sure to tell your doctor about any health problems you have or medicines you take.  He or she can help you choose the birth control method that is right for you. What are the advantages of an IUD? · An IUD is one of the most effective methods of birth control. · It prevents pregnancy for 3 to 10 years, depending on the type. You don't have to worry about birth control during this time. · It's safe to use while breastfeeding. · IUDs don't contain estrogen. So you can use an IUD if you don't want to take estrogen or can't take estrogen because you have certain health problems or concerns. · An IUD is convenient. It is always providing birth control. You don't need to remember to take a pill or get a shot. You don't have to interrupt sex to protect against pregnancy. · A hormonal IUD may reduce heavy bleeding and cramping. What are the disadvantages of an IUD? · An IUD doesn't protect against sexually transmitted infections (STIs), such as herpes or HIV/AIDS. If you aren't sure if your sex partner might have an STI, use a condom to protect against disease. · A copper IUD may cause periods with more bleeding and cramping. · You have to see a doctor to have an IUD inserted and removed. · You have to check to see if the string is in place. Where can you learn more? Go to http://mary kay-yung.info/. Enter L534 in the search box to learn more about \"Learning About Birth Control: Intrauterine Device (IUD). \" Current as of: November 21, 2017 Content Version: 11.7 © 7427-2410 [employer]. Care instructions adapted under license by [employer] (which disclaims liability or warranty for this information). If you have questions about a medical condition or this instruction, always ask your healthcare professional. [place] [address] any warranty or liability for your use of this information.

## 2018-07-31 NOTE — PROGRESS NOTES
1. Have you been to the ER, urgent care clinic since your last visit? Hospitalized since your last visit? no 
 
2. Have you seen or consulted any other health care providers outside of the [address] [place] [address] since your last visit? Include any pap smears or colon screening. no 
Reviewed record in preparation for visit and have obtained necessary documentation. Patient did not bring medications to visit for review. Information provided on Advanced Directive, Living Will. Body mass index is 33.61 kg/(m^2). There are no preventive care reminders to display for this patient.

## 2018-08-01 LAB — HCG INTACT+B SERPL-ACNC: <1 MIU/ML

## 2018-08-02 ENCOUNTER — OFFICE VISIT (OUTPATIENT)
Dept: FAMILY MEDICINE CLINIC | Age: 38
End: 2018-08-02

## 2018-08-02 VITALS
SYSTOLIC BLOOD PRESSURE: 152 MMHG | OXYGEN SATURATION: 97 % | DIASTOLIC BLOOD PRESSURE: 93 MMHG | WEIGHT: 202 LBS | TEMPERATURE: 98.7 F | HEART RATE: 74 BPM | RESPIRATION RATE: 20 BRPM | BODY MASS INDEX: 33.66 KG/M2 | HEIGHT: 65 IN

## 2018-08-02 DIAGNOSIS — N94.6 DYSMENORRHEA: Primary | ICD-10-CM

## 2018-08-02 NOTE — PROGRESS NOTES
Margaret Beck is a 41 y.o. female here for IUD placement. Reason for IUD placement: Dysmenorrhea        She not had an IUD in the past.    Last pap smear: ASCUS +, HPV negative on 2018    Patient's last menstrual period was 2018. Visit Vitals    BP (!) 152/93 (BP 1 Location: Left arm, BP Patient Position: Sitting)    Pulse 74    Temp 98.7 °F (37.1 °C) (Oral)    Resp 20    Ht 5' 5\" (1.651 m)    Wt 202 lb (91.6 kg)    LMP 2018    SpO2 97%    Breastfeeding No    BMI 33.61 kg/m2       Physical Exam   Constitutional: She appears well-developed and well-nourished. Genitourinary: Vagina normal. There is no rash, tenderness, lesion or injury on the right labia. There is no rash or tenderness on the left labia. No tenderness in the vagina. No vaginal discharge found. Cervix does not exhibit motion tenderness, friability or nabothian cyst.   Cardiovascular: Normal rate and regular rhythm. Pulmonary/Chest: Effort normal and breath sounds normal. No respiratory distress. Nursing note and vitals reviewed. Pt is a  42 yo female with dysmenorrhea who presents for IUD placement     Dysmenorrhea  - UPT : negative   - IUD placement    - Pt advised to take Motrin for pain   - Precautions given , pt to call 911, if pt feels faint, pelvic pain,belly  Pain ,sever vaginal bleeding    Elevated BP  - Will recheck bp before discharge       Follow-up Disposition:  Return in about 4 weeks (around 2018) for string check .             IUD Procedure Note:    [facility]  OFFICE PROCEDURE PROGRESS NOTE      Chart reviewed for the following:  Yared Mao MD, have reviewed the History, Physical and updated the Allergic reactions for [address] performed immediately prior to start of procedure:  Yared Mao MD, have performed the following reviews on Margaret Beck prior to the start of the procedure:            * Patient was identified by name and date of birth   * Agreement on procedure being performed was verified  * Risks and Benefits explained to the patient which include but are not limited to bleeding, infection, uterine perforation, and damage to surrounding structures  * Procedure site verified and marked as necessary  * Patient was positioned for comfort  * Consent was signed and verified     Time: 4:15 PM    Date of procedure: 8/2/2018    Procedure performed by:  Truman Han MD    Provider assisted by: Dr. Dea Butler MD    Patient assisted by: self    How tolerated by patient: tolerated the procedure well with no complications    Procedure in detail:    Pt understands the method and alternatives and agrees to IUD placement. Urine pregnancy test was negative. Pt placed in dorsal lithotomy position. A sterile speculum was placed in the patients vagina. The cervix was cleansed with betadine solution. The upper lip of the cervix was grasped with a single toothed tenaculum and gentle traction was applied to align the cervical canal with the uterine cavity. The uterus was the sounded to 8 cm. The Mirena  was then inserted into the cervical canal and advanced into the uterus until the flange was 1 cm from the external os. While holding the  steady the arms of the Mirena were released by pulling the slider back until the top of the slider reached the shea. The  was then gently pushed into the uterine cavity until the flange touched the cervix. The  was then firmly held in position as the Mirena was released by pulling the slider down all the way. The  was then removed from the uterus. The threads were cut to 4 cm visible outside the cervix. The instruments were removed from the pts vagina. Hemostasis was assured from the tenaculum sites. The patient tolerated the procedure well.     Pt was advised to use backup contraception for one month and to followup after her next menses to assure proper placement. Dr. Mónica Elena (attending) present for entire procedure.     Rosemary Harper MD  Family Medicine Resident

## 2018-08-02 NOTE — PROGRESS NOTES
1. Have you been to the ER, urgent care clinic since your last visit? Hospitalized since your last visit? No    2. Have you seen or consulted any other health care providers outside of the [address] [place] [address] since your last visit? Include any pap smears or colon screening.  No  Reviewed record in preparation for visit and have necessary documentation  Pt did not bring medication to office visit for review  Goals that were addressed and/or need to be completed during or after this appointment include     Health Maintenance Due   Topic Date Due    Influenza Age 5 to Adult  08/01/2018

## 2018-08-03 NOTE — PATIENT INSTRUCTIONS
Intrauterine Device (IUD) Insertion: Care Instructions  Your Care Instructions    The intrauterine device (IUD) is a very effective method of birth control. It is a small, plastic, T-shaped device that contains copper or hormones. The doctor inserts the IUD into your uterus. A plastic string tied to the end of the IUD hangs down through the cervix into the vagina. There are two types of IUDs. The copper IUD is effective for up to 10 years. The hormonal IUD is effective for either 3 years or 5 years, depending on which IUD is used. The hormonal IUD also reduces menstrual bleeding and cramping. Both types of IUD damage or kill the man's sperm. This means that the woman's egg does not join with the sperm. IUDs also change the lining of the uterus so that the egg does not lodge there. The IUD is most likely to work well for women who have been pregnant before. Some women who have never been pregnant have more trouble keeping the IUD in the uterus. They also may have more pain and cramping after insertion. Follow-up care is a key part of your treatment and safety. Be sure to make and go to all appointments, and call your doctor if you are having problems. It's also a good idea to know your test results and keep a list of the medicines you take. How can you care for yourself at home? · You may experience some mild cramping and light bleeding (spotting) for 1 or 2 days. Use a hot water bottle or a heating pad set on low on your belly for pain. · Take an over-the-counter pain medicine, such as acetaminophen (Tylenol), ibuprofen (Advil, Motrin), and naproxen (Aleve) if needed. Read and follow all instructions on the label. · Do not take two or more pain medicines at the same time unless the doctor told you to. Many pain medicines have acetaminophen, which is Tylenol. Too much acetaminophen (Tylenol) can be harmful. · Check the string of your IUD after every period.  To do this, insert a finger into your vagina and feel for the cervix, which is at the top of the vagina and feels harder than the rest of your vagina. You should be able to feel the thin, plastic string coming out of the opening of your cervix. If you cannot feel the string, use another form of birth control and make an appointment with your doctor to have the string checked. · If the IUD comes out, save it and call your doctor. Be sure to use another form of birth control while the IUD is out. · Use latex condoms to protect against sexually transmitted infections (STIs), such as gonorrhea and chlamydia. An IUD does not protect you from STIs. Having one sex partner (who does not have STIs and does not have sex with anyone else) is a good way to avoid STIs. When should you call for help? Call 911 anytime you think you may need emergency care. For example, call if:    · You passed out (lost consciousness).     · You have sudden, severe pain in your belly or pelvis.    Call your doctor now or seek immediate medical care if:    · You have new belly or pelvic pain.     · You have severe vaginal bleeding. This means that you are soaking through your usual pads or tampons each hour for 2 or more hours.     · You are dizzy or lightheaded, or you feel like you may faint.     · You have a fever and pelvic pain or vaginal discharge.     · You have pelvic pain that is getting worse.    Watch closely for changes in your health, and be sure to contact your doctor if:    · You cannot feel the string, or the IUD comes out.     · You feel sick to your stomach, or you vomit.     · You think you may be pregnant. Where can you learn more? Go to http://mary kay-yung.info/. Enter W904 in the search box to learn more about \"Intrauterine Device (IUD) Insertion: Care Instructions. \"  Current as of: November 21, 2017  Content Version: 11.7  © 6559-1768 [employer].  Care instructions adapted under license by [employer] (which disclaims liability or warranty for this information). If you have questions about a medical condition or this instruction, always ask your healthcare professional. [place] [address] any warranty or liability for your use of this information.

## 2018-08-07 NOTE — PROGRESS NOTES
I saw and evaluated the patient, performing the key elements of the service. I discussed the findings, assessment and plan with the resident and agree with the resident's findings and plan as documented in the resident's note. I was present for and supervised the entire procedure. See resident note for details.

## 2018-08-09 ENCOUNTER — TELEPHONE (OUTPATIENT)
Dept: FAMILY MEDICINE CLINIC | Age: 38
End: 2018-08-09

## 2018-08-09 RX ORDER — POLYMYXIN B SULFATE AND TRIMETHOPRIM 1; 10000 MG/ML; [USP'U]/ML
1 SOLUTION OPHTHALMIC EVERY 4 HOURS
Qty: 10 ML | Refills: 0 | Status: SHIPPED | OUTPATIENT
Start: 2018-08-09 | End: 2018-08-16

## 2018-08-09 NOTE — TELEPHONE ENCOUNTER
Pt has stye on eye per pt it has become worse, red, swollen shut, and warm to touch. Pt was wondering if something could be sent in for her?

## 2018-08-16 ENCOUNTER — TELEPHONE (OUTPATIENT)
Dept: FAMILY MEDICINE CLINIC | Age: 38
End: 2018-08-16

## 2018-08-16 DIAGNOSIS — E66.9 BMI (BODY MASS INDEX) PEDIATRIC, > 99% FOR AGE, OBESE CHILD, TERTIARY CARE INTERVENTION: Primary | ICD-10-CM

## 2018-08-16 RX ORDER — PHENTERMINE HYDROCHLORIDE 37.5 MG/1
37.5 TABLET ORAL
Qty: 90 TAB | Refills: 0 | Status: SHIPPED | OUTPATIENT
Start: 2018-08-16 | End: 2019-01-17

## 2018-08-16 RX ORDER — PHENTERMINE HYDROCHLORIDE 37.5 MG/1
37.5 TABLET ORAL
Qty: 90 TAB | Refills: 0 | Status: SHIPPED | OUTPATIENT
Start: 2018-08-16 | End: 2018-08-16 | Stop reason: CLARIF

## 2018-08-17 ENCOUNTER — DOCUMENTATION ONLY (OUTPATIENT)
Dept: FAMILY MEDICINE CLINIC | Age: 38
End: 2018-08-17

## 2018-08-17 DIAGNOSIS — L98.9 FINGER LESION: ICD-10-CM

## 2018-08-17 DIAGNOSIS — L98.0 PYOGENIC GRANULOMA: Primary | ICD-10-CM

## 2018-08-17 RX ORDER — SULFAMETHOXAZOLE AND TRIMETHOPRIM 800; 160 MG/1; MG/1
1 TABLET ORAL 2 TIMES DAILY
Qty: 14 TAB | Refills: 0 | Status: SHIPPED | OUTPATIENT
Start: 2018-08-17 | End: 2018-08-24

## 2018-08-17 NOTE — PROGRESS NOTES
Bactrim sent to [facility] for coverage-stitches from the removal of a Pyogenic granuloma. Lesion noted to be edematous, erythematous and off white/tan drainage. Allergies reviewed.

## 2018-08-31 DIAGNOSIS — J30.1 CHRONIC SEASONAL ALLERGIC RHINITIS DUE TO POLLEN: ICD-10-CM

## 2018-08-31 RX ORDER — CETIRIZINE HCL 10 MG
10 TABLET ORAL DAILY
Qty: 90 TAB | Refills: 3 | Status: SHIPPED | OUTPATIENT
Start: 2018-08-31 | End: 2018-12-04 | Stop reason: SDUPTHER

## 2018-11-03 ENCOUNTER — TELEPHONE (OUTPATIENT)
Dept: FAMILY MEDICINE CLINIC | Age: 38
End: 2018-11-03

## 2018-11-03 RX ORDER — AZITHROMYCIN 250 MG/1
TABLET, FILM COATED ORAL
Qty: 6 TAB | Refills: 0 | Status: SHIPPED | OUTPATIENT
Start: 2018-11-03 | End: 2018-11-08

## 2018-11-15 ENCOUNTER — OFFICE VISIT (OUTPATIENT)
Dept: FAMILY MEDICINE CLINIC | Age: 38
End: 2018-11-15

## 2018-11-15 VITALS
HEART RATE: 82 BPM | BODY MASS INDEX: 34.49 KG/M2 | HEIGHT: 65 IN | RESPIRATION RATE: 16 BRPM | OXYGEN SATURATION: 97 % | WEIGHT: 207 LBS | TEMPERATURE: 98.6 F | SYSTOLIC BLOOD PRESSURE: 139 MMHG | DIASTOLIC BLOOD PRESSURE: 93 MMHG

## 2018-11-15 DIAGNOSIS — Z72.0 TOBACCO ABUSE: ICD-10-CM

## 2018-11-15 DIAGNOSIS — Z71.3 WEIGHT LOSS COUNSELING, ENCOUNTER FOR: ICD-10-CM

## 2018-11-15 DIAGNOSIS — Z71.6 ENCOUNTER FOR SMOKING CESSATION COUNSELING: Primary | ICD-10-CM

## 2018-11-15 DIAGNOSIS — R03.0 ELEVATED BLOOD PRESSURE READING: ICD-10-CM

## 2018-11-15 DIAGNOSIS — E66.9 OBESITY (BMI 30-39.9): ICD-10-CM

## 2018-11-15 RX ORDER — BUPROPION HYDROCHLORIDE 150 MG/1
150 TABLET ORAL
Qty: 63 TAB | Refills: 1 | Status: SHIPPED | OUTPATIENT
Start: 2018-11-15 | End: 2019-01-17

## 2018-11-15 NOTE — PROGRESS NOTES
Chief Complaint Patient presents with  Medication Refill 1. Have you been to the ER, urgent care clinic since your last visit? Hospitalized since your last visit? No 
 
2. Have you seen or consulted any other health care providers outside of the [address] [place] [address] since your last visit? Include any pap smears or colon screening. No 
 
Reviewed record in preparation for visit and have necessary documentation Pt did not bring medication to office visit for review Information was given to pt on Advanced Directives, Living Will Information was given on Shingles Vaccine Opportunity was given for questions Goals that were addressed and/or need to be completed after this appointment include: There are no preventive care reminders to display for this patient.

## 2018-11-15 NOTE — PROGRESS NOTES
[id number] [address] Res[facility], [address] [place] [address] Affiliated with [facility] and [facility] Note Subjective:  
Deirdre Ames is a 41 y.o. female presents for evaluation of tobacco cessation CC:I need o stop smoking History provided by patient HPI: 
Tobacco Abuse Pt is smoking 2-4 cigs a day, at max. Pt is planning to stopafter thanksgiving . Pt has been smoking on and off for the past ten years. Weightloss Counseling Pt reports gaining weight since giving birth 6 months ago. She states she is worried her that her weight will be more uncontrolled. She has used phentermine in the past but is not currently taking. Current Outpatient Medications on File Prior to Visit Medication Sig Dispense Refill  cetirizine (ZYRTEC) 10 mg tablet Take 1 Tab by mouth daily. 90 Tab 3  phentermine (ADIPEX-P) 37.5 mg tablet Take 1 Tab by mouth every morning. Max Daily Amount: 37.5 mg. Indications: WEIGHT LOSS MANAGEMENT FOR OBESE PATIENT (BMI >= 30) 90 Tab 0 No current facility-administered medications on file prior to visit. Past Medical History:  
Diagnosis Date  Anemia 5/15/2018  Anemia  Essential hypertension 10 years ago with first pregnancy  Granuloma of skin 04/01/2018 Social History Socioeconomic History  Marital status:  Spouse name: Not on file  Number of children: Not on file  Years of education: Not on file  Highest education level: Not on file Social Needs  Financial resource strain: Not on file  Food insecurity - worry: Not on file  Food insecurity - inability: Not on file  Transportation needs - medical: Not on file  Transportation needs - non-medical: Not on file Occupational History  Not on file Tobacco Use  Smoking status: Former Smoker Packs/day: 0.25 Years: 10.00 Pack years: 2.50 Types: Cigarettes Last attempt to quit: 6/16/2015 Years since quitting: 3.4  Smokeless tobacco: Never Used Substance and Sexual Activity  Alcohol use: No  
  Alcohol/week: 0.0 oz  Drug use: No  
 Sexual activity: Yes  
  Partners: Male Birth control/protection: Condom Other Topics Concern  Not on file Social History Narrative  Not on file Review of Systems Constitutional: Negative for chills and fever. Respiratory: Negative for cough and hemoptysis. Cardiovascular: Negative for chest pain. Gastrointestinal: Negative for abdominal pain, nausea and vomiting. Genitourinary: Negative for dysuria. Musculoskeletal: Negative for myalgias. Skin: Negative for itching and rash. Neurological: Negative for dizziness, tingling, sensory change, focal weakness and headaches. Psychiatric/Behavioral: Negative for depression, hallucinations, memory loss, substance abuse and suicidal ideas. The patient is not nervous/anxious and does not have insomnia. Tobacco abuse Objective:  
 
Visit Vitals BP (!) 139/93 Pulse 82 Temp 98.6 °F (37 °C) (Oral) Resp 16 Ht 5' 5\" (1.651 m) Wt 207 lb (93.9 kg) LMP 11/15/2018 SpO2 97% BMI 34.45 kg/m² Physical Exam: 
Physical Exam  
Constitutional: She is oriented to person, place, and time. She appears well-developed and well-nourished. HENT:  
Head: Normocephalic and atraumatic. Eyes: Conjunctivae are normal. Pupils are equal, round, and reactive to light. Right eye exhibits no discharge. Left eye exhibits no discharge. No scleral icterus. Neck: Normal range of motion. Neck supple. Cardiovascular: Normal rate, regular rhythm, normal heart sounds and intact distal pulses. Exam reveals no gallop and no friction rub. No murmur heard. Pulmonary/Chest: Effort normal and breath sounds normal. No respiratory distress. She has no wheezes. She has no rales. She exhibits no tenderness. Abdominal: Soft.  Bowel sounds are normal.  
 Musculoskeletal: Normal range of motion. She exhibits no edema, tenderness or deformity. Neurological: She is alert and oriented to person, place, and time. No cranial nerve deficit. Skin: Skin is warm and dry. No rash noted. No erythema. Nursing note and vitals reviewed. Assessment and orders: ICD-10-CM ICD-9-CM 1. Encounter for smoking cessation counseling Z71.6 V65.42 buPROPion XL (WELLBUTRIN XL) 150 mg tablet 305.1 2. Tobacco abuse Z72.0 305.1 3. Obesity (BMI 30-39. 9) E66.9 278.00   
4. Weight loss counseling, encounter for Z71.3 V65.3 5. Elevated blood pressure reading R03.0 796.2 Diagnoses and all orders for this visit: 1. Encounter for smoking cessation counseling 
-     buPROPion XL (WELLBUTRIN XL) 150 mg tablet; Take 1 Tab by mouth every morning. 2. Tobacco abuse 3. Obesity (BMI 30-39.9) 4. Weight loss counseling, encounter for 5. Elevated blood pressure reading Follow-up Disposition: 
Return in about 2 weeks (around 11/29/2018) for bp check ,weightloss counseling, tobacco cessation. I have reviewed patient medical and social history and medications. I have reviewed pertinent labs results and other data. I have discussed the diagnosis with the patient and the intended plan as seen in the above orders. The patient has received an after-visit summary and questions were answered concerning future plans. I have discussed medication side effects and warnings with the patient as well. Leela Nix MD 
Resident [facility] 11/19/18

## 2018-11-15 NOTE — PATIENT INSTRUCTIONS
Wellbutrin Oral: Initial: 150 mg once daily for 3 days; increase to 150 mg twice daily (maximum dose: 300 mg/day). Start decreasing 1 cig a week Insomnia: Care Instructions Your Care Instructions Insomnia is the inability to sleep well. It is a common problem for most people at some time. Insomnia may make it hard for you to get to sleep, stay asleep, or sleep as long as you need to. This can make you tired and grouchy during the day. It can also make you forgetful, less effective at work, and unhappy. Insomnia can be caused by conditions such as depression or anxiety. Pain can also affect your ability to sleep. When these problems are solved, the insomnia usually clears up. But sometimes bad sleep habits can cause insomnia. If insomnia is affecting your work or your enjoyment of life, you can take steps to improve your sleep. Follow-up care is a key part of your treatment and safety. Be sure to make and go to all appointments, and call your doctor if you are having problems. It's also a good idea to know your test results and keep a list of the medicines you take. How can you care for yourself at home? What to avoid · Do not have drinks with caffeine, such as coffee or black tea, for 8 hours before bed. · Do not smoke or use other types of tobacco near bedtime. Nicotine is a stimulant and can keep you awake. · Avoid drinking alcohol late in the evening, because it can cause you to wake in the middle of the night. · Do not eat a big meal close to bedtime. If you are hungry, eat a light snack. · Do not drink a lot of water close to bedtime, because the need to urinate may wake you up during the night. · Do not read or watch TV in bed. Use the bed only for sleeping and sexual activity. What to try · Go to bed at the same time every night, and wake up at the same time every morning. Do not take naps during the day. · Keep your bedroom quiet, dark, and cool. · Sleep on a comfortable pillow and mattress. · If watching the clock makes you anxious, turn it facing away from you so you cannot see the time. · If you worry when you lie down, start a worry book. Well before bedtime, write down your worries, and then set the book and your concerns aside. · Try meditation or other relaxation techniques before you go to bed. · If you cannot fall asleep, get up and go to another room until you feel sleepy. Do something relaxing. Repeat your bedtime routine before you go to bed again. · Make your house quiet and calm about an hour before bedtime. Turn down the lights, turn off the TV, log off the computer, and turn down the volume on music. This can help you relax after a busy day. When should you call for help? Watch closely for changes in your health, and be sure to contact your doctor if: 
  · Your efforts to improve your sleep do not work.  
  · Your insomnia gets worse.  
  · You have been feeling down, depressed, or hopeless or have lost interest in things that you usually enjoy. Where can you learn more? Go to http://mary kay-yung.info/. Enter P513 in the search box to learn more about \"Insomnia: Care Instructions. \" Current as of: June 29, 2018 Content Version: 11.8 © 1549-4747 Healthwise[employer]orporated. Care instructions adapted under license by [employer] (which disclaims liability or warranty for this information). If you have questions about a medical condition or this instruction, always ask your healthcare professional. [place] [address] any warranty or liability for your use of this information.

## 2018-11-20 NOTE — PROGRESS NOTES
I discussed the findings, assessment and plan in detail with the resident and agree with the resident's findings and plan as documented in the resident's note. Arielle Morgan M.D.

## 2018-12-04 DIAGNOSIS — J30.1 CHRONIC SEASONAL ALLERGIC RHINITIS DUE TO POLLEN: ICD-10-CM

## 2018-12-04 RX ORDER — CETIRIZINE HCL 10 MG
10 TABLET ORAL DAILY
Qty: 90 TAB | Refills: 3 | Status: SHIPPED | OUTPATIENT
Start: 2018-12-04 | End: 2018-12-17 | Stop reason: SDUPTHER

## 2018-12-17 DIAGNOSIS — N63.14 BREAST LUMP ON RIGHT SIDE AT 4 O'CLOCK POSITION: Primary | ICD-10-CM

## 2018-12-17 DIAGNOSIS — J30.1 CHRONIC SEASONAL ALLERGIC RHINITIS DUE TO POLLEN: ICD-10-CM

## 2018-12-17 RX ORDER — CETIRIZINE HCL 10 MG
10 TABLET ORAL DAILY
Qty: 90 TAB | Refills: 3 | Status: SHIPPED | OUTPATIENT
Start: 2018-12-17

## 2018-12-17 NOTE — PROGRESS NOTES
Pt reports that she has noticed a lump in her right breast at the 4 o'clock. Her mother has a history of breast cancer. Pt is not able to get an appt this week due to her work schedule. Will order US and see her back for follow-up results.

## 2019-01-17 ENCOUNTER — OFFICE VISIT (OUTPATIENT)
Dept: FAMILY MEDICINE CLINIC | Age: 39
End: 2019-01-17

## 2019-01-17 VITALS
OXYGEN SATURATION: 99 % | BODY MASS INDEX: 33.92 KG/M2 | DIASTOLIC BLOOD PRESSURE: 87 MMHG | TEMPERATURE: 97.5 F | HEART RATE: 76 BPM | SYSTOLIC BLOOD PRESSURE: 135 MMHG | RESPIRATION RATE: 18 BRPM | HEIGHT: 65 IN | WEIGHT: 203.6 LBS

## 2019-01-17 DIAGNOSIS — Z72.0 TOBACCO ABUSE: ICD-10-CM

## 2019-01-17 DIAGNOSIS — E66.9 OBESITY (BMI 30-39.9): ICD-10-CM

## 2019-01-17 DIAGNOSIS — R03.0 ELEVATED BLOOD PRESSURE READING: Primary | ICD-10-CM

## 2019-01-17 NOTE — PROGRESS NOTES
Chief Complaint   Patient presents with    Hypertension     Visit Vitals  /87 (BP 1 Location: Right arm, BP Patient Position: Sitting)   Pulse 76   Temp 97.5 °F (36.4 °C) (Oral)   Resp 18   Ht 5' 5\" (1.651 m)   Wt 203 lb 9.6 oz (92.4 kg)   LMP 09/01/2017 (Approximate)   SpO2 99%   BMI 33.88 kg/m²     1. Have you been to the ER, urgent care clinic since your last visit? Hospitalized since your last visit? No    2. Have you seen or consulted any other health care providers outside of the [address] [place] [address] since your last visit? Include any pap smears or colon screening.  No    Reviewed record in preparation for visit and have necessary documentation  Pt did not bring medication to office visit for review  opportunity was given for questions  Goals that were addressed and/or need to be completed during or after this appointment include

## 2019-01-18 NOTE — PATIENT INSTRUCTIONS
Learning About Benefits From Quitting Smoking  How does quitting smoking make you healthier? If you're thinking about quitting smoking, you may have a few reasons to be smoke-free. Your health may be one of them. · When you quit smoking, you lower your risks for cancer, lung disease, heart attack, stroke, blood vessel disease, and blindness from macular degeneration. · When you're smoke-free, you get sick less often, and you heal faster. You are less likely to get colds, flu, bronchitis, and pneumonia. · As a nonsmoker, you may find that your mood is better and you are less stressed. When and how will you feel healthier? Quitting has real health benefits that start from day 1 of being smoke-free. And the longer you stay smoke-free, the healthier you get and the better you feel. The first hours  · After just 20 minutes, your blood pressure and heart rate go down. That means there's less stress on your heart and blood vessels. · Within 12 hours, the level of carbon monoxide in your blood drops back to normal. That makes room for more oxygen. With more oxygen in your body, you may notice that you have more energy than when you smoked. After 2 weeks  · Your lungs start to work better. · Your risk of heart attack starts to drop. After 1 month  · When your lungs are clear, you cough less and breathe deeper, so it's easier to be active. · Your sense of taste and smell return. That means you can enjoy food more than you have since you started smoking. Over the years  · After 1 year, your risk of heart disease is half what it would be if you kept smoking. · After 5 years, your risk of stroke starts to shrink. Within a few years after that, it's about the same as if you'd never smoked. · After 10 years, your risk of dying from lung cancer is cut by about half. And your risk for many other types of cancer is lower too. How would quitting help others in your life?   When you quit smoking, you improve the health of everyone who now breathes in your smoke. · Their heart, lung, and cancer risks drop, much like yours. · They are sick less. For babies and small children, living smoke-free means they're less likely to have ear infections, pneumonia, and bronchitis. · If you're a woman who is or will be pregnant someday, quitting smoking means a healthier . · Children who are close to you are less likely to become adult smokers. Where can you learn more? Go to http://mary kay-yung.info/. Enter 052 [id number] 72 11 in the search box to learn more about \"Learning About Benefits From Quitting Smoking. \"  Current as of: 2018  Content Version: 11.9  © 9668-8136 [employer]. Care instructions adapted under license by [employer] (which disclaims liability or warranty for this information). If you have questions about a medical condition or this instruction, always ask your healthcare professional. [place] [address] any warranty or liability for your use of this information. Starting a Weight Loss Plan: Care Instructions  Your Care Instructions    If you are thinking about losing weight, it can be hard to know where to start. Your doctor can help you set up a weight loss plan that best meets your needs. You may want to take a class on nutrition or exercise, or join a weight loss support group. If you have questions about how to make changes to your eating or exercise habits, ask your doctor about seeing a registered dietitian or an exercise specialist.  It can be a big challenge to lose weight. But you do not have to make huge changes at once. Make small changes, and stick with them. When those changes become habit, add a few more changes. If you do not think you are ready to make changes right now, try to pick a date in the future. Make an appointment to see your doctor to discuss whether the time is right for you to start a plan.   Follow-up care is a key part of your treatment and safety. Be sure to make and go to all appointments, and call your doctor if you are having problems. It's also a good idea to know your test results and keep a list of the medicines you take. How can you care for yourself at home? · Set realistic goals. Many people expect to lose much more weight than is likely. A weight loss of 5% to 10% of your body weight may be enough to improve your health. · Get family and friends involved to provide support. Talk to them about why you are trying to lose weight, and ask them to help. They can help by participating in exercise and having meals with you, even if they may be eating something different. · Find what works best for you. If you do not have time or do not like to cook, a program that offers meal replacement bars or shakes may be better for you. Or if you like to prepare meals, finding a plan that includes daily menus and recipes may be best.  · Ask your doctor about other health professionals who can help you achieve your weight loss goals. ? A dietitian can help you make healthy changes in your diet. ? An exercise specialist or  can help you develop a safe and effective exercise program.  ? A counselor or psychiatrist can help you cope with issues such as depression, anxiety, or family problems that can make it hard to focus on weight loss. · Consider joining a support group for people who are trying to lose weight. Your doctor can suggest groups in your area. Where can you learn more? Go to http://mary kay-yung.info/. Enter Y535 in the search box to learn more about \"Starting a Weight Loss Plan: Care Instructions. \"  Current as of: June 26, 2018  Content Version: 11.8  © 6729-2015 Healthwise, Incorporated. Care instructions adapted under license by [employer] (which disclaims liability or warranty for this information).  If you have questions about a medical condition or this instruction, always ask your healthcare professional. [place] [address] any warranty or liability for your use of this information.

## 2019-01-18 NOTE — PROGRESS NOTES
Progress Note    Patient: Cally Solorzano MRN: [id number]  SSN: [id number]    YOB: 1980  Age: 41 y.o. Sex: female        Chief Complaint   Patient presents with    Hypertension     she is a 41y.o. year old female who presents after several high BP readings. Patient has had prior high BP reading in office. She has been unsuccessful in losing weight since the birth of her child this past year. She has resumed smoking. She has been experiencing LBP. Patient denies HA, dizziness, SOB, CP, abdominal pain, dysuria, weakness or paresthesia. BP Readings from Last 3 Encounters:   19 135/87   11/15/18 (!) 139/93   18 (!) 152/93     Wt Readings from Last 3 Encounters:   19 203 lb 9.6 oz (92.4 kg)   11/15/18 207 lb (93.9 kg)   18 202 lb (91.6 kg)     Body mass index is 33.88 kg/m². Encounter Diagnoses   Name Primary?  Elevated blood pressure reading Yes    Obesity (BMI 30-39. 9)     Tobacco abuse        Patient Active Problem List   Diagnosis Code    Tobacco abuse Z72.0    Gastroesophageal reflux disease without esophagitis K21.9    History of pre-eclampsia Z87.59    History of  Z98.891    Pyogenic granuloma L98.0    Anemia D64.9     Past Surgical History:   Procedure Laterality Date     DELIVERY ONLY      for hypertension and macrosomia    HX OTHER SURGICAL  2018    granuloma removed from under right breast     Social History     Socioeconomic History    Marital status:      Spouse name: Not on file    Number of children: Not on file    Years of education: Not on file    Highest education level: Not on file   Social Needs    Financial resource strain: Not on file    Food insecurity - worry: Not on file    Food insecurity - inability: Not on file   [place] needs - medical: Not on file   [place] needs - non-medical: Not on file   Occupational History    Not on file   Tobacco Use    Smoking status: Former Smoker     Packs/day: 0.25     Years: 10.00     Pack years: 2.50     Types: Cigarettes     Last attempt to quit: 6/16/2015     Years since quitting: 3.5    Smokeless tobacco: Never Used   Substance and Sexual Activity    Alcohol use: No     Alcohol/week: 0.0 oz    Drug use: No    Sexual activity: Yes     Partners: Male     Birth control/protection: Condom   Other Topics Concern    Not on file   Social History Narrative    Not on file     Family History   Problem Relation Age of Onset    Breast Cancer Mother     Heart Disease Mother     Cancer Father      Current Outpatient Medications   Medication Sig    cetirizine (ZYRTEC) 10 mg tablet Take 1 Tab by mouth daily. No current facility-administered medications for this visit. Allergies   Allergen Reactions    Ceclor [Cefaclor] Hives    Penicillin G Other (comments)     Abdominal pain       Review of Systems:  Constitutional: Negative for fatigue, malaise  Resp: Negative for cough, wheezing or SOB  CV: Negative for chest pain, dizziness or palpitations  GI: Negative for nausea or abdominal pain  MS: Negative for acute myalgias or arthralgias   Neuro: Negative for HA, weakness or paresthesia  Psych: Negative for depression or anxiety     Vitals:    01/17/19 0942   BP: 135/87   Pulse: 76   Resp: 18   Temp: 97.5 °F (36.4 °C)   TempSrc: Oral   SpO2: 99%   Weight: 203 lb 9.6 oz (92.4 kg)   Height: 5' 5\" (1.651 m)       Physical Examination:  General: Well developed, well nourished, in no acute distress  Head: Normocephalic, atraumatic  Eyes: Sclera clear, EOMI  Neck: Normal range of motion  Respiratory: symmetrical, unlabored effort  Cardiovascular: Regular rate and rhythm  Extremities: Full range of motion, normal gait  Neurologic: No focal deficits  Psych: Active, alert and oriented. Affect appropriate       ICD-10-CM ICD-9-CM    1. Elevated blood pressure reading R03.0 796.2 AMB POC EKG ROUTINE W/ 12 LEADS, INTER & REP   2. Obesity (BMI 30-39. 9) E66.9 278.00 AMB POC EKG ROUTINE W/ 12 LEADS, INTER & REP   3. Tobacco abuse Z72.0 305.1 AMB POC EKG ROUTINE W/ 12 LEADS, INTER & REP     Plan of care:  Diagnoses were discussed in detail with patient. Keep BP log and bring to follow up appointment. Recommend patient work on weight loss by a decrease in daily caloric intake and an increase in aerobic exercise. Strongly advised patient to stop all use of tobacco products. Medication risks/benefits/side effects discussed with patient. All of the patient's questions were addressed and answered to apparent satisfaction. The patient understands and agrees with our plan of care. The patient knows to call back if they have questions about the plan of care or if symptoms change. The patient received an After-Visit Summary which contains VS, diagnoses, orders, allergy and medication lists. Future Appointments   Date Time Provider Ciara Duarte   1/31/2019  8:00 AM Mirna Gallego MD [facility] VIDAL SCHED       Follow-up Disposition:  Return in about 2 weeks (around 1/31/2019), or if symptoms worsen or fail to improve.

## 2019-01-31 ENCOUNTER — OFFICE VISIT (OUTPATIENT)
Dept: FAMILY MEDICINE CLINIC | Age: 39
End: 2019-01-31

## 2019-01-31 ENCOUNTER — HOSPITAL ENCOUNTER (OUTPATIENT)
Dept: LAB | Age: 39
Discharge: HOME | End: 2019-01-31
Payer: COMMERCIAL

## 2019-01-31 VITALS
HEIGHT: 65 IN | HEART RATE: 67 BPM | BODY MASS INDEX: 33.32 KG/M2 | WEIGHT: 200 LBS | SYSTOLIC BLOOD PRESSURE: 157 MMHG | TEMPERATURE: 98.1 F | RESPIRATION RATE: 16 BRPM | DIASTOLIC BLOOD PRESSURE: 103 MMHG | OXYGEN SATURATION: 99 %

## 2019-01-31 DIAGNOSIS — I10 ESSENTIAL HYPERTENSION: ICD-10-CM

## 2019-01-31 DIAGNOSIS — M53.3 SACRAL PAIN: ICD-10-CM

## 2019-01-31 DIAGNOSIS — Z01.419 WELL WOMAN EXAM WITH ROUTINE GYNECOLOGICAL EXAM: Primary | ICD-10-CM

## 2019-01-31 DIAGNOSIS — M54.41 ACUTE MIDLINE LOW BACK PAIN WITH RIGHT-SIDED SCIATICA: ICD-10-CM

## 2019-01-31 DIAGNOSIS — H66.92 ACUTE OTITIS MEDIA, LEFT: ICD-10-CM

## 2019-01-31 DIAGNOSIS — M54.50 ACUTE MIDLINE LOW BACK PAIN WITHOUT SCIATICA: Primary | ICD-10-CM

## 2019-01-31 PROCEDURE — 87624 HPV HI-RISK TYP POOLED RSLT: CPT

## 2019-01-31 PROCEDURE — 88175 CYTOPATH C/V AUTO FLUID REDO: CPT

## 2019-01-31 RX ORDER — PREDNISONE 20 MG/1
40 TABLET ORAL
Qty: 10 TAB | Refills: 0 | Status: SHIPPED | OUTPATIENT
Start: 2019-01-31 | End: 2019-01-31 | Stop reason: ALTCHOICE

## 2019-01-31 RX ORDER — CEFDINIR 300 MG/1
300 CAPSULE ORAL 2 TIMES DAILY
Qty: 20 CAP | Refills: 0 | Status: SHIPPED | OUTPATIENT
Start: 2019-01-31 | End: 2019-02-10

## 2019-01-31 NOTE — PROGRESS NOTES
CC: CESAR 
 
HPI: Pt is a 41 y.o. female who presents for [facility]. She is also having pain in her sacrum. Has been going on for about a month and recently worse in the past 3 days. Worse when she flexes her neck and when she is trying to get out of bed. Feels tender to touch. Initially it caused a shooting pain down her R leg but this has not happened recently. She has tried Motrin which helps. She has had sciatica in the past which felt like this initially but now feels different. She denies fevers, saddle anesthesia and bowel/bladder incontinence. Has had a mild HA as well and discomfort of the L ear for the past week or so as well. She has been monitoring her BP and readings have been 120's/70's. She has been having some swelling in her hands but denies vision changes. She has been eating less salt. She is still smoking and struggling to quit. Last pap: 1/2016, ASCUS, HPV negative History of abnormal paps?: See above, otherwise no Abnormal vaginal bleeding or discharge?: No 
Desire to be tested for STDs today?: No 
LMP: 1/29/19 Last mammogram: N/A Self breast checks?: Yes New lumps or bumps?: No - she had felt a spot in her right breast about 6 weeks ago but that has gone away now Family history of ovarian, uterine or breast cancer?: Yes, mom with breast cancer diagnosed in her 52's. Pt denies any physical, emotional or verbal abuse. Past Medical History:  
Diagnosis Date  Anemia 5/15/2018  Anemia  Essential hypertension 10 years ago with first pregnancy  Granuloma of skin 04/01/2018 Family History Problem Relation Age of Onset  Breast Cancer Mother  Heart Disease Mother  Cancer Father Social History Tobacco Use  Smoking status: Current Every Day Smoker Packs/day: 0.25 Years: 10.00 Pack years: 2.50 Types: Cigarettes Last attempt to quit: 6/16/2015 Years since quitting: 3.6  Smokeless tobacco: Never Used Substance Use Topics  Alcohol use: No  
  Alcohol/week: 0.0 oz  Drug use: No  
 
 
 
PE: 
Visit Vitals BP (!) 157/103 (BP 1 Location: Right arm, BP Patient Position: Sitting) Pulse 67 Temp 98.1 °F (36.7 °C) (Oral) Resp 16 Ht 5' 5\" (1.651 m) Wt 200 lb (90.7 kg) LMP 09/01/2017 (Approximate) SpO2 99% BMI 33.28 kg/m² Gen: Pt sitting in chair, in NAD Head: Normocephalic, atraumatic Eyes: Sclera anicteric, EOM grossly intact, PERRL Ears: R TM pearly with good light reflex. L TM with yellow effusion and erythematous canal.  
Throat: MMM, normal lips, tongue, teeth and gums. No pharyngeal erythema Neck: Supple, no LAD, no thyromegaly or carotid bruits CVS: Normal S1, S2, no m/r/g Resp: CTAB, no wheezes or rales Abd: Soft, non-tender, non-distended : Normal external female genitalia. Vaginal mucosa pink, moist. Small amount bloody discharge. Cervix with nabothian cyst at 4 o'clock and mild hyperpigmentation around that. IUD strings both visualized. Back: TTP over sacrum, otherwise no bony tenderness of the spine or paravertebral tenderness. Flexion of back limited 2/2 pain, otherwise full AROM without pain. Negative SLR b/l (causes pain in the back but no radiation). Strength 5/5 in BLE and sensation intact to light touch and temperature in BLE Extrem: Atraumatic, no cyanosis or edema Pulses: 2+ Skin: Warm, dry Neuro: Alert, oriented, appropriate A/P: Pt is a 41 y.o. female who presents for [facility]. Also with back pain, AOM and elevated BP 
- Pap with HPV. If abnormal again will get her set up for colposcopy - Lipid panel 
- CMP 
- Omnicef for AOM on left. Pt states she has been able to tolerate this in the past despite allergies to Ceclor and PCN 
- XR lumbar spine and sacrum - Will recheck pt's BP later today. If still elevated, will start medication, likely HCTZ to bring this down - RTC prn pending results of above work-up Discussed diagnoses in detail with patient. Medication risks/benefits/side effects discussed with patient. All of the patient's questions were addressed. The patient understands and agrees with our plan of care. The patient knows to call back if they are unsure of or forget any changes we discussed today or if the symptoms change. The patient received an After-Visit Summary which contains VS, orders, medication list and allergy list. This can be used as a \"mini-medical record\" should they have to seek medical care while out of town. Current Outpatient Medications on File Prior to Visit Medication Sig Dispense Refill  cetirizine (ZYRTEC) 10 mg tablet Take 1 Tab by mouth daily. 90 Tab 3 No current facility-administered medications on file prior to visit.

## 2019-01-31 NOTE — PROGRESS NOTES
Chief Complaint Patient presents with  Well Woman Pap  Back Pain Body mass index is 33.28 kg/m². 1. Have you been to the ER, urgent care clinic since your last visit? Hospitalized since your last visit? No 
 
2. Have you seen or consulted any other health care providers outside of the [address] [place] [address] since your last visit? Include any pap smears or colon screening. No 
 
Reviewed record in preparation for visit and have necessary documentation Pt did not bring medication to office visit for review Information was given to pt on Advanced Directives, Living Will Information was given on Shingles Vaccine Opportunity was given for questions Goals that were addressed and/or need to be completed after this appointment include: There are no preventive care reminders to display for this patient.

## 2019-01-31 NOTE — PATIENT INSTRUCTIONS

## 2019-02-01 ENCOUNTER — TELEPHONE (OUTPATIENT)
Dept: FAMILY MEDICINE CLINIC | Age: 39
End: 2019-02-01

## 2019-02-01 DIAGNOSIS — I10 ESSENTIAL HYPERTENSION: Primary | ICD-10-CM

## 2019-02-01 RX ORDER — HYDROCHLOROTHIAZIDE 12.5 MG/1
12.5 TABLET ORAL DAILY
Qty: 30 TAB | Refills: 0 | Status: SHIPPED | OUTPATIENT
Start: 2019-02-01 | End: 2019-03-15 | Stop reason: SDUPTHER

## 2019-02-01 NOTE — TELEPHONE ENCOUNTER
Pt called reporting home BP's of 147/95 and 145/82. Will start HCTZ at 12.5mg and titrate up as needed. All questions answered and pt feels comfortable with the plan of care.

## 2019-02-02 LAB
ALBUMIN SERPL-MCNC: 4 G/DL (ref 3.5–5.5)
ALBUMIN/GLOB SERPL: 1.7 {RATIO} (ref 1.2–2.2)
ALP SERPL-CCNC: 76 IU/L (ref 39–117)
ALT SERPL-CCNC: 19 IU/L (ref 0–32)
AST SERPL-CCNC: 14 IU/L (ref 0–40)
BILIRUB SERPL-MCNC: 0.2 MG/DL (ref 0–1.2)
BUN SERPL-MCNC: 12 MG/DL (ref 6–20)
BUN/CREAT SERPL: 15 (ref 9–23)
CALCIUM SERPL-MCNC: 9.1 MG/DL (ref 8.7–10.2)
CHLORIDE SERPL-SCNC: 105 MMOL/L (ref 96–106)
CHOLEST SERPL-MCNC: 167 MG/DL (ref 100–199)
CO2 SERPL-SCNC: 22 MMOL/L (ref 20–29)
CREAT SERPL-MCNC: 0.79 MG/DL (ref 0.57–1)
GLOBULIN SER CALC-MCNC: 2.3 G/DL (ref 1.5–4.5)
GLUCOSE SERPL-MCNC: 80 MG/DL (ref 65–99)
HDLC SERPL-MCNC: 64 MG/DL
LDLC SERPL CALC-MCNC: 80 MG/DL (ref 0–99)
POTASSIUM SERPL-SCNC: 4.5 MMOL/L (ref 3.5–5.2)
PROT SERPL-MCNC: 6.3 G/DL (ref 6–8.5)
SODIUM SERPL-SCNC: 143 MMOL/L (ref 134–144)
TRIGL SERPL-MCNC: 115 MG/DL (ref 0–149)
VLDLC SERPL CALC-MCNC: 23 MG/DL (ref 5–40)

## 2019-02-11 ENCOUNTER — TELEPHONE (OUTPATIENT)
Dept: FAMILY MEDICINE CLINIC | Age: 39
End: 2019-02-11

## 2019-02-11 DIAGNOSIS — B96.89 BACTERIAL VAGINOSIS: Primary | ICD-10-CM

## 2019-02-11 DIAGNOSIS — N76.0 BACTERIAL VAGINOSIS: Primary | ICD-10-CM

## 2019-02-11 RX ORDER — METRONIDAZOLE 500 MG/1
500 TABLET ORAL 2 TIMES DAILY
Qty: 14 TAB | Refills: 0 | Status: SHIPPED | OUTPATIENT
Start: 2019-02-11 | End: 2019-02-18

## 2019-02-11 RX ORDER — METRONIDAZOLE 500 MG/1
500 TABLET ORAL 2 TIMES DAILY
Qty: 14 TAB | Refills: 0 | Status: SHIPPED | OUTPATIENT
Start: 2019-02-11 | End: 2019-02-11 | Stop reason: SDUPTHER

## 2019-02-11 NOTE — TELEPHONE ENCOUNTER
Pt called stating she is having vaginal discharge similar to previous BV infection. Her recent pap showed evidence of BV. Will send in rx for Flagyl to her pharmacy. All questions answered and pt feels comfortable with the plan of care.

## 2019-02-13 ENCOUNTER — TELEPHONE (OUTPATIENT)
Dept: FAMILY MEDICINE CLINIC | Age: 39
End: 2019-02-13

## 2019-02-13 DIAGNOSIS — R30.0 DYSURIA: Primary | ICD-10-CM

## 2019-02-13 RX ORDER — SULFAMETHOXAZOLE AND TRIMETHOPRIM 800; 160 MG/1; MG/1
1 TABLET ORAL 2 TIMES DAILY
Qty: 14 TAB | Refills: 0 | Status: SHIPPED | OUTPATIENT
Start: 2019-02-13 | End: 2019-02-19

## 2019-02-13 NOTE — TELEPHONE ENCOUNTER
Patient is having urinary pain, back pain and noticed blood in urine. Requesting antibiotic. Also concerned of getting yeast infection due to recent antibiotics prescribed.

## 2019-02-15 RX ORDER — FLUCONAZOLE 150 MG/1
150 TABLET ORAL DAILY
Qty: 1 TAB | Refills: 0 | Status: SHIPPED | OUTPATIENT
Start: 2019-02-15 | End: 2019-02-16

## 2019-02-17 LAB
BACTERIA UR CULT: ABNORMAL
BACTERIA UR CULT: ABNORMAL

## 2019-02-19 ENCOUNTER — TELEPHONE (OUTPATIENT)
Dept: FAMILY MEDICINE CLINIC | Age: 39
End: 2019-02-19

## 2019-02-19 RX ORDER — HYDROXYZINE 25 MG/1
25 TABLET, FILM COATED ORAL
Qty: 30 TAB | Refills: 0 | Status: SHIPPED | OUTPATIENT
Start: 2019-02-19 | End: 2019-03-01

## 2019-02-19 RX ORDER — PREDNISONE 20 MG/1
20 TABLET ORAL 2 TIMES DAILY
Qty: 10 TAB | Refills: 0 | Status: SHIPPED | OUTPATIENT
Start: 2019-02-19 | End: 2019-02-24

## 2019-02-19 NOTE — TELEPHONE ENCOUNTER
Pt is having an allergic reaction to antibiotic (Bactrim) with hives covering arms, chest, neck and legs. Benadryl x2 doses taken without any results. Rash has worsened. Pt stopped ABT.

## 2019-03-15 DIAGNOSIS — I10 ESSENTIAL HYPERTENSION: ICD-10-CM

## 2019-03-15 RX ORDER — HYDROCHLOROTHIAZIDE 12.5 MG/1
12.5 TABLET ORAL DAILY
Qty: 30 TAB | Refills: 3 | Status: SHIPPED | OUTPATIENT
Start: 2019-03-15 | End: 2019-09-05 | Stop reason: SDUPTHER

## 2019-05-07 ENCOUNTER — OFFICE VISIT (OUTPATIENT)
Dept: FAMILY MEDICINE CLINIC | Age: 39
End: 2019-05-07

## 2019-05-07 VITALS
SYSTOLIC BLOOD PRESSURE: 124 MMHG | TEMPERATURE: 98.2 F | DIASTOLIC BLOOD PRESSURE: 82 MMHG | BODY MASS INDEX: 34.89 KG/M2 | HEIGHT: 65 IN | HEART RATE: 75 BPM | WEIGHT: 209.4 LBS | OXYGEN SATURATION: 98 % | RESPIRATION RATE: 18 BRPM

## 2019-05-07 DIAGNOSIS — E66.09 CLASS 1 OBESITY DUE TO EXCESS CALORIES WITHOUT SERIOUS COMORBIDITY WITH BODY MASS INDEX (BMI) OF 34.0 TO 34.9 IN ADULT: ICD-10-CM

## 2019-05-07 DIAGNOSIS — R63.2 BINGE EATING: ICD-10-CM

## 2019-05-07 DIAGNOSIS — I10 ESSENTIAL HYPERTENSION: ICD-10-CM

## 2019-05-07 DIAGNOSIS — Z71.3 ENCOUNTER FOR WEIGHT LOSS COUNSELING: Primary | ICD-10-CM

## 2019-05-07 NOTE — PROGRESS NOTES
Chief Complaint   Patient presents with    Medication Evaluation     weight loss     Visit Vitals  /82 (BP 1 Location: Left arm, BP Patient Position: Sitting)   Pulse 75   Temp 98.2 °F (36.8 °C) (Oral)   Resp 18   Ht 5' 5\" (1.651 m)   Wt 209 lb 6.4 oz (95 kg)   LMP 09/01/2017 (Approximate)   SpO2 98%   BMI 34.85 kg/m²     1. Have you been to the ER, urgent care clinic since your last visit? Hospitalized since your last visit? No    2. Have you seen or consulted any other health care providers outside of the [address] [place] [address] since your last visit? Include any pap smears or colon screening.  No    Reviewed record in preparation for visit and have necessary documentation  Pt did not bring medication to office visit for review  opportunity was given for questions  Goals that were addressed and/or need to be completed during or after this appointment include   Health Maintenance Due   Topic Date Due    Pneumococcal 0-64 years (1 of 1 - PPSV23) 04/26/1987

## 2019-05-07 NOTE — PATIENT INSTRUCTIONS
Weight Loss Diet Guidelines      Eat ONLY when you are hungry, and stop just before you are full. If you are eating enough fat in your meals, you will feel full for 5-6 hours after, and you can avoid snacks. This is your goal.     Eat More of these Foods:    Meat: Beef, lamb, pork, chicken and others  Fish: Any kind of fish  Eggs: As many as you want, with the yolk  Vegetables: ANY vegetables but limit starchy vegetables like potatoes, corn, carrots or peas  Nuts and Seeds: No more than one handful a day  High-fat Dairy: Cheese, butter, heavy cream      Eat Less of this, but OK Rarely:    Fruits: Berries and Melon are best. May have one handful per day. Limit other fruits, particularly bananas, grapes, mangos, and pineapple  DO NOT DRINK ANY JUICE, EVER. Whole grains: Oatmeal, quinoa, brown rice  Legumes: Beans, lentils      Do Not Eat these Foods:    Drinks with calories: Sodas, fruit juices, sweet tea, sports drinks (gatorade, etc)  Sugar: Honey, agave, candy, cake, cookies, ice cream  Grains: Bread, pasta, crackers, cereal, chips  Yogurt: Most yogurt is as bad as candy. Eat only high-fat, plain yogurt, like Fage 2% plain. Trans Fats: \"Hydrogenated\" or \"partially hydrogenated\" oils, margarine  \"Diet\" and \"Low fat\" Products  Highly processed foods. If it looks like it was made in a factory, don't eat it. If it has more than 5 ingredients, it is processed. What Should Meals Look Like? Breakfast: [place], eggs, sausage or plain yogurt with berries  Lunch: Big salad with meat, cheese, avocado, homemade dressing or olive oil and vinegar. Or meat, chicken, fish and vegetables. Dinner: Meat, chicken or fish and vegetables, or salad, like above  Snack: Berries, cheese, nuts  Drinks:Plenty of water.  May also have coffee, tea, or artificially sweetened beverages (but not too many)

## 2019-05-07 NOTE — PROGRESS NOTES
[id number] [address] [facility], [address] [place] [address] Affiliated with [facility] and [facility] Note Subjective:  
Nathaniel Chau is a 46 y.o. female presents for evaluation of  
CC: 
History provided by patient {and ***} HPI: 
 
 
Current Outpatient Medications on File Prior to Visit Medication Sig Dispense Refill  hydroCHLOROthiazide (HYDRODIURIL) 12.5 mg tablet Take 1 Tab by mouth daily. 30 Tab 3  cetirizine (ZYRTEC) 10 mg tablet Take 1 Tab by mouth daily. 90 Tab 3 No current facility-administered medications on file prior to visit. Past Medical History:  
Diagnosis Date  Anemia 5/15/2018  Anemia  Essential hypertension 10 years ago with first pregnancy  Granuloma of skin 04/01/2018 Social History Socioeconomic History  Marital status:  Spouse name: Not on file  Number of children: Not on file  Years of education: Not on file  Highest education level: Not on file Occupational History  Not on file Social Needs  Financial resource strain: Not on file  Food insecurity:  
  Worry: Not on file Inability: Not on file  Transportation needs:  
  Medical: Not on file Non-medical: Not on file Tobacco Use  Smoking status: Current Every Day Smoker Packs/day: 0.25 Years: 10.00 Pack years: 2.50 Types: Cigarettes Last attempt to quit: 6/16/2015 Years since quitting: 3.8  Smokeless tobacco: Never Used Substance and Sexual Activity  Alcohol use: No  
  Alcohol/week: 0.0 oz  Drug use: No  
 Sexual activity: Yes  
  Partners: Male Birth control/protection: Condom Lifestyle  Physical activity:  
  Days per week: Not on file Minutes per session: Not on file  Stress: Not on file Relationships  Social connections:  
  Talks on phone: Not on file Gets together: Not on file Attends Caodaism service: Not on file Active member of club or organization: Not on file Attends meetings of clubs or organizations: Not on file Relationship status: Not on file  Intimate partner violence:  
  Fear of current or ex partner: Not on file Emotionally abused: Not on file Physically abused: Not on file Forced sexual activity: Not on file Other Topics Concern  Not on file Social History Narrative  Not on file ROS Objective:  
 
Visit Vitals /82 (BP 1 Location: Left arm, BP Patient Position: Sitting) Pulse 75 Temp 98.2 °F (36.8 °C) (Oral) Resp 18 Ht 5' 5\" (1.651 m) Wt 209 lb 6.4 oz (95 kg) LMP 09/01/2017 (Approximate) SpO2 98% BMI 34.85 kg/m² Physical Exam: 
Physical Exam 
 
 
Assessment and orders:  
{ASSESSMENT/PLAN:27456}I have reviewed patient medical and social history and medications. I have reviewed pertinent labs results and other data. I have discussed the diagnosis with the patient and the intended plan as seen in the above orders. The patient has received an after-visit summary and questions were answered concerning future plans. I have discussed medication side effects and warnings with the patient as well. Ronald Bautista MD 
Resident [facility] 05/07/19

## 2019-05-07 NOTE — PROGRESS NOTES
[address] [facility]  Initial Visit      Date: 05/07/19    CC: Pt is a 46 y.o. female with history of mobid obesity who presents for weight loss counseling, initial visit. BMI today: Body mass index is 34.85 kg/m². Weight today: 209  Comorbid conditions: HTN  Goals for weight loss: 160lbs    Diet history:     Breakfast: coffee (with creamer ), 2 x cookies, tuna     Lunch: big mac and fries    Dinner: airfryer noodles    Snacks: chips,cauliflower, brocolli    Drinks: water, coffee     Cravings: salt , bread     Dietary preferences/cultural restrictions:none     Have you ever tried any diets/structured weight loss programs?: denies    Has patient ever been on medications for weight loss? If so, which ones, how long, and how effective were they?: Pt has taken Adderall, phentermine and Wellbutrin before in the past with minimal results.      Has patient ever had surgery for weight loss?: Denies     PE:  Visit Vitals  /82 (BP 1 Location: Left arm, BP Patient Position: Sitting)   Pulse 75   Temp 98.2 °F (36.8 °C) (Oral)   Resp 18   Ht 5' 5\" (1.651 m)   Wt 209 lb 6.4 oz (95 kg)   LMP 09/01/2017 (Approximate)   SpO2 98%   BMI 34.85 kg/m²     Gen: Pt sitting in chair, in NAD  Head: Normocephalic, atraumatic  Eyes: Sclera anicteric, EOM grossly intact, PERRL  Throat: MMM, normal lips, tongue, teeth and gums  Neck: Supple, no LAD  CVS: Normal S1, S2, no m/r/g  Resp: CTAB, no wheezes or rales  Abd: Soft, non-tender, non-distended, +BS  Extrem: Atraumatic, no cyanosis or edema  Pulses: 2+  Skin: Warm, dry  Neuro: Alert, oriented, appropriate    Pertinent labs:  Lab Results   Component Value Date/Time    Hemoglobin A1c 5.3 06/16/2016 08:55 AM     Lab Results   Component Value Date/Time    Sodium 143 02/01/2019 11:35 AM    Potassium 4.5 02/01/2019 11:35 AM    Chloride 105 02/01/2019 11:35 AM    CO2 22 02/01/2019 11:35 AM    Glucose 80 02/01/2019 11:35 AM    BUN 12 02/01/2019 11:35 AM    Creatinine 0.79 02/01/2019 11:35 AM    BUN/Creatinine ratio 15 02/01/2019 11:35 AM    GFR est  02/01/2019 11:35 AM    GFR est non-AA 96 02/01/2019 11:35 AM    Calcium 9.1 02/01/2019 11:35 AM    Bilirubin, total 0.2 02/01/2019 11:35 AM    AST (SGOT) 14 02/01/2019 11:35 AM    Alk. phosphatase 76 02/01/2019 11:35 AM    Protein, total 6.3 02/01/2019 11:35 AM    Albumin 4.0 02/01/2019 11:35 AM    A-G Ratio 1.7 02/01/2019 11:35 AM    ALT (SGPT) 19 02/01/2019 11:35 AM     Lab Results   Component Value Date/Time    Cholesterol, total 167 02/01/2019 11:35 AM    HDL Cholesterol 64 02/01/2019 11:35 AM    LDL, calculated 80 02/01/2019 11:35 AM    VLDL, calculated 23 02/01/2019 11:35 AM    Triglyceride 115 02/01/2019 11:35 AM     Lab Results   Component Value Date/Time    TSH 3.120 05/30/2013 08:39 AM       A/P: Pt is a 46 y.o. female who present for weight loss counseling, initial visit. - Diet history taken today and recommendations made as noted in Patient Instructions  - Medications discussed today and decision made to start on Vyvanse as pt has signs and symptoms of binge eating  - RTC in 1 month for obesity follow-up    I have reviewed/discussed the above normal BMI with the patient. I have recommended the following interventions: dietary management education, guidance, and counseling, monitor weight and prescribed dietary intake .

## 2019-06-18 DIAGNOSIS — R63.2 BINGE EATING: ICD-10-CM

## 2019-07-15 DIAGNOSIS — N89.8 VAGINAL DISCHARGE: Primary | ICD-10-CM

## 2019-07-15 RX ORDER — METRONIDAZOLE 500 MG/1
500 TABLET ORAL 2 TIMES DAILY
Qty: 14 TAB | Refills: 0 | Status: SHIPPED | OUTPATIENT
Start: 2019-07-15 | End: 2019-07-22

## 2019-09-12 ENCOUNTER — OFFICE VISIT (OUTPATIENT)
Dept: FAMILY MEDICINE CLINIC | Age: 39
End: 2019-09-12

## 2019-09-12 VITALS
TEMPERATURE: 98.6 F | HEIGHT: 65 IN | HEART RATE: 98 BPM | DIASTOLIC BLOOD PRESSURE: 73 MMHG | SYSTOLIC BLOOD PRESSURE: 106 MMHG | OXYGEN SATURATION: 98 % | BODY MASS INDEX: 32.82 KG/M2 | RESPIRATION RATE: 20 BRPM | WEIGHT: 197 LBS

## 2019-09-12 DIAGNOSIS — J01.90 ACUTE RHINOSINUSITIS: Primary | ICD-10-CM

## 2019-09-12 RX ORDER — AZITHROMYCIN 250 MG/1
TABLET, FILM COATED ORAL
Qty: 6 TAB | Refills: 0 | Status: SHIPPED | OUTPATIENT
Start: 2019-09-12 | End: 2019-09-17

## 2019-09-12 NOTE — PROGRESS NOTES
1. Have you been to the ER, urgent care clinic since your last visit? Hospitalized since your last visit? No    2. Have you seen or consulted any other health care providers outside of the [address] [place] [address] since your last visit? Include any pap smears or colon screening.  No  Reviewed record in preparation for visit and have necessary documentation  Pt did not bring medication to office visit for review    Goals that were addressed and/or need to be completed during or after this appointment include     Health Maintenance Due   Topic Date Due    Pneumococcal 0-64 years (1 of 1 - PPSV23) 04/26/1987    Influenza Age 5 to Adult  08/01/2019

## 2019-09-12 NOTE — LETTER
NOTIFICATION RETURN TO WORK  
 
9/12/2019 12:28 PM 
 
[facility] [address] [facility] [id number] To Whom It May Concern: 
 
Samson Marino is currently under the care of [facility]. She was evaluated and treated in office today. Please excuse from work for 9/11/2019. If there are questions or concerns please have the patient contact our office. Sincerely, Ritchie Benson MD

## 2019-09-16 NOTE — PATIENT INSTRUCTIONS

## 2019-09-16 NOTE — PROGRESS NOTES
Patient: Mahesh Barragan MRN: [id number]  SSN: [id number]    YOB: 1980  Age: 46 y.o. Sex: female      Chief Complaint   Patient presents with    Sinus Pain    Ear Pain     left     Mahesh Barragan is a 46 y.o. female presents with complaints of dry cough, bilateral sinus pain, left ear pain and malaise for 1 weeks. There has been no nausea and no vomiting . she has not had  swollen glands, myalgias and fever. Symptoms are moderate. Patient is drinking plenty of fluids. There is not a hx of asthma. There is a hx of allergic rhinitis. There is a hx of tobacco use. There have been contacts with similar infections. Medications:     Current Outpatient Medications   Medication Sig    azithromycin (ZITHROMAX) 250 mg tablet Take 2 tablets today, then take 1 tablet daily    hydroCHLOROthiazide (HYDRODIURIL) 12.5 mg tablet TAKE 1 TABLET BY MOUTH DAILY    cetirizine (ZYRTEC) 10 mg tablet Take 1 Tab by mouth daily. No current facility-administered medications for this visit. Problem List:     Patient Active Problem List    Diagnosis Date Noted    Class 1 obesity due to excess calories without serious comorbidity with body mass index (BMI) of 34.0 to 34.9 in adult 2019    Essential hypertension 2019    Pyogenic granuloma 05/15/2018    Anemia 05/15/2018    History of  2018    Tobacco abuse 10/19/2017    Gastroesophageal reflux disease without esophagitis 10/19/2017    History of pre-eclampsia 10/19/2017       Medical History:     Past Medical History:   Diagnosis Date    Anemia 5/15/2018    Anemia     Essential hypertension     10 years ago with first pregnancy    Granuloma of skin 2018       Allergies:      Allergies   Allergen Reactions    Bactrim [Sulfamethoxazole-Trimethoprim] Hives    Ceclor [Cefaclor] Hives    Penicillin G Other (comments)     Abdominal pain       Surgical History:     Past Surgical History:   Procedure Laterality Date   DELIVERY ONLY      for hypertension and macrosomia    HX OTHER SURGICAL  2018    granuloma removed from under right breast       Social History:     Social History     Socioeconomic History    Marital status:      Spouse name: Not on file    Number of children: Not on file    Years of education: Not on file    Highest education level: Not on file   Tobacco Use    Smoking status: Current Every Day Smoker     Packs/day: 0.25     Years: 10.00     Pack years: 2.50     Types: Cigarettes     Last attempt to quit: 2015     Years since quittin.2    Smokeless tobacco: Never Used   Substance and Sexual Activity    Alcohol use: No     Alcohol/week: 0.0 standard drinks    Drug use: No    Sexual activity: Yes     Partners: Male     Birth control/protection: Condom       Review of Symptoms:  Constitutional: c/o malaise, denies fever or chills  Skin: Negative for rash or lesion  Head: Negative for facial swelling or tenderness  Eyes: Negative for redness or discharge  Ears: Negative for otalgia or decreased hearing  Nose: c/o nasal congestion, denies sinus pressure  Neck: c/o sore throat, denies lymphadenopathy   Cardiovascular: Negative for chest pain or palpitations  Respiratory: c/o non-productive cough, denies wheezing or SOB  Gastrointestinal: Negative for nausea or abdominal pain  Neurologic: Negative for headache or dizziness      Visit Vitals  /73 (BP 1 Location: Left arm, BP Patient Position: Sitting)   Pulse 98   Temp 98.6 °F (37 °C) (Oral)   Resp 20   Ht 5' 5\" (1.651 m)   Wt 197 lb (89.4 kg)   SpO2 98%   BMI 32.78 kg/m²       Physical Examination:  General: Well developed, well nourished, in no acute distress  Skin: Warm and dry sans rash or lesion  Head: Normocephalic, atraumatic  Eyes: Sclera clear, EOMI, PERRL  Ears: left tympanic membranes dull, bulging  Nose: mucosal edema with rhinorrhea  Oropharynx: posterior erythema, no exudate   Neck: Normal range of motion, no lymphadenopathy  Cardiovascular: normal S1, S2, regular rate and rhythm  Respiratory: Clear to auscultation bilaterally with symmetrical, unlabored effort  Extremities: Full range of motion  Neurologic: Active, alert and oriented      Diagnoses and all orders for this visit:    1. Acute rhinosinusitis  -     azithromycin (ZITHROMAX) 250 mg tablet; Take 2 tablets today, then take 1 tablet daily        Symptomatic therapy suggested: rest, increase fluids and call prn if symptoms persist or worsen. I have discussed the diagnosis with the patient and the intended plan as seen in the above orders. The patient expresses understanding and agreement with our plan of care. All of the patient's questions were answered to apparent satisfaction. The patient has received an after-visit summary. The patient knows to call our office if there are any questions or concerns regarding diagnosis and treatment plans. I have discussed medication side effects and warnings with the patient as well. Follow-up and Dispositions    · Return if symptoms worsen or fail to improve.

## 2019-10-14 ENCOUNTER — OFFICE VISIT (OUTPATIENT)
Dept: FAMILY MEDICINE CLINIC | Age: 39
End: 2019-10-14

## 2019-10-14 VITALS
BODY MASS INDEX: 33.99 KG/M2 | HEIGHT: 65 IN | RESPIRATION RATE: 16 BRPM | DIASTOLIC BLOOD PRESSURE: 76 MMHG | HEART RATE: 69 BPM | WEIGHT: 204 LBS | SYSTOLIC BLOOD PRESSURE: 122 MMHG | TEMPERATURE: 98.4 F | OXYGEN SATURATION: 97 %

## 2019-10-14 DIAGNOSIS — I10 ESSENTIAL HYPERTENSION: ICD-10-CM

## 2019-10-14 DIAGNOSIS — E66.9 CLASS 1 OBESITY WITH SERIOUS COMORBIDITY AND BODY MASS INDEX (BMI) OF 33.0 TO 33.9 IN ADULT, UNSPECIFIED OBESITY TYPE: ICD-10-CM

## 2019-10-14 DIAGNOSIS — F50.81 BINGE EATING DISORDER: Primary | ICD-10-CM

## 2019-10-14 NOTE — PATIENT INSTRUCTIONS
Weight Loss Diet Guidelines      Eat ONLY when you are hungry, and stop just before you are full. If you are eating enough fat in your meals, you will feel full for 5-6 hours after, and you can avoid snacks. This is your goal.     Eat More of these Foods:    Meat: Beef, lamb, pork, chicken and others  Fish: Any kind of fish  Eggs: As many as you want, with the yolk  Vegetables: ANY vegetables but limit starchy vegetables like potatoes, corn, carrots or peas  Nuts and Seeds: No more than one handful a day  High-fat Dairy: Cheese, butter, heavy cream      Eat Less of this, but OK Rarely:    Fruits: Berries and Melon are best. May have one handful per day. Limit other fruits, particularly bananas, grapes, mangos, and pineapple  DO NOT DRINK ANY JUICE, EVER. Whole grains: Oatmeal, quinoa, brown rice  Legumes: Beans, lentils      Do Not Eat these Foods:    Drinks with calories: Sodas, fruit juices, sweet tea, sports drinks (gatorade, etc)  Sugar: Honey, agave, candy, cake, cookies, ice cream  Grains: Bread, pasta, crackers, cereal, chips  Yogurt: Most yogurt is as bad as candy. Eat only high-fat, plain yogurt, like Fage 2% plain. Trans Fats: \"Hydrogenated\" or \"partially hydrogenated\" oils, margarine  \"Diet\" and \"Low fat\" Products  Highly processed foods. If it looks like it was made in a factory, don't eat it. If it has more than 5 ingredients, it is processed. What Should Meals Look Like? Breakfast: Sydelle Pintos, eggs, sausage or plain yogurt with berries  Lunch: Big salad with meat, cheese, avocado, homemade dressing or olive oil and vinegar. Or meat, chicken, fish and vegetables. Dinner: Meat, chicken or fish and vegetables, or salad, like above  Snack: Berries, cheese, nuts  Drinks:Plenty of water.  May also have coffee, tea, or artificially sweetened beverages (but not too many)

## 2019-10-14 NOTE — PROGRESS NOTES
[facility]  Initial Visit      Date: 10/14/19    CC: Pt is a 46 y.o. F who presents for weight loss counseling, initial visit. She has stopped taking HCTZ since her last visit and her BP has been doing fine. She is trying to do the keto diet. She feels like she overeats and is not able to control her eating, particularly on the weekends. She had been on Vyvanse in the past for ADHD and noticed that that helped her overeating. BMI today: Body mass index is 33.95 kg/m². Weight today: 204lbs  Comorbid conditions: HTN  Goals for weight loss: Decrease BP, increase energy level and self-esteem and overall health. Would like to get to 170lbs    Diet history:     Cravings: Salty foods    Dietary preferences/cultural restrictions: None    Have you ever tried any diets/structured weight loss programs?: Tried a vitamin supplement once    Has patient ever been on medications for weight loss? If so, which ones, how long, and how effective were they?: Tried phentermine and it made her BP go up. Tried Wellbutrin in the past and it made her heart race.      Has patient ever had surgery for weight loss?: No    PE:  Visit Vitals  /76 (BP 1 Location: Right arm, BP Patient Position: Sitting)   Pulse 69   Temp 98.4 °F (36.9 °C) (Oral)   Resp 16   Ht 5' 5\" (1.651 m)   Wt 204 lb (92.5 kg)   SpO2 97%   BMI 33.95 kg/m²     Gen: Pt sitting in chair, in NAD  Head: Normocephalic, atraumatic  Eyes: Sclera anicteric, EOM grossly intact, PERRL  Throat: MMM, normal lips, tongue, teeth and gums  Neck: Supple, no LAD  CVS: Normal S1, S2, no m/r/g  Resp: CTAB, no wheezes or rales  Extrem: Atraumatic, no cyanosis or edema  Pulses: 2+  Skin: Warm, dry  Neuro: Alert, oriented, appropriate    Pertinent labs:  Lab Results   Component Value Date/Time    Hemoglobin A1c 5.3 06/16/2016 08:55 AM     Lab Results   Component Value Date/Time    Sodium 143 02/01/2019 11:35 AM    Potassium 4.5 02/01/2019 11:35 AM Chloride 105 02/01/2019 11:35 AM    CO2 22 02/01/2019 11:35 AM    Glucose 80 02/01/2019 11:35 AM    BUN 12 02/01/2019 11:35 AM    Creatinine 0.79 02/01/2019 11:35 AM    BUN/Creatinine ratio 15 02/01/2019 11:35 AM    GFR est  02/01/2019 11:35 AM    GFR est non-AA 96 02/01/2019 11:35 AM    Calcium 9.1 02/01/2019 11:35 AM    Bilirubin, total 0.2 02/01/2019 11:35 AM    AST (SGOT) 14 02/01/2019 11:35 AM    Alk. phosphatase 76 02/01/2019 11:35 AM    Protein, total 6.3 02/01/2019 11:35 AM    Albumin 4.0 02/01/2019 11:35 AM    A-G Ratio 1.7 02/01/2019 11:35 AM    ALT (SGPT) 19 02/01/2019 11:35 AM     Lab Results   Component Value Date/Time    Cholesterol, total 167 02/01/2019 11:35 AM    HDL Cholesterol 64 02/01/2019 11:35 AM    LDL, calculated 80 02/01/2019 11:35 AM    VLDL, calculated 23 02/01/2019 11:35 AM    Triglyceride 115 02/01/2019 11:35 AM     Lab Results   Component Value Date/Time    TSH 3.120 05/30/2013 08:39 AM       A/P: Pt is a 46 y.o. F who present for weight loss counseling, initial visit. - Diet history taken today and recommendations made as noted in Patient Instructions  - Medications discussed today and decision made to start Vyvanse. - Weight loss surgery discussed today and decision made to hold off for now  - Fine to stay off HCTZ for now but will monitor BP carefully now that starting Vyvanse. She did not have elevated BP's with this in the past, however. - RTC in 1 month for obesity follow-up    I have reviewed/discussed the above normal BMI with the patient. I have recommended the following interventions: dietary management education, guidance, and counseling, monitor weight and prescribed dietary intake . Kristie Chandler

## 2019-10-14 NOTE — PROGRESS NOTES
1. Have you been to the ER, urgent care clinic since your last visit? Hospitalized since your last visit? no    2. Have you seen or consulted any other health care providers outside of the [address] [place] [address] since your last visit? Include any pap smears or colon screening. no  Reviewed record in preparation for visit and have obtained necessary documentation. Patient did not bring medications to visit for review. Information provided on Advanced Directive, Living Will. Body mass index is 33.95 kg/m².    Health Maintenance Due   Topic Date Due    Pneumococcal 0-64 years (1 of 1 - PPSV23) [birth year]/26/1987

## 2019-11-14 ENCOUNTER — TELEPHONE (OUTPATIENT)
Dept: FAMILY MEDICINE CLINIC | Age: 39
End: 2019-11-14

## 2019-11-14 DIAGNOSIS — F50.81 BINGE EATING DISORDER: Primary | ICD-10-CM

## 2019-11-14 NOTE — TELEPHONE ENCOUNTER
Pt in office today. She is doing well on the Vyvanse but is interested in increasing the dose. She is down to 200lbs. Will send in 50mg dose and pt will check in in one month. All questions answered and pt feels comfortable with the plan of care.

## 2019-12-12 DIAGNOSIS — F50.81 BINGE EATING DISORDER: ICD-10-CM

## 2020-01-02 ENCOUNTER — TELEPHONE (OUTPATIENT)
Dept: FAMILY MEDICINE CLINIC | Age: 40
End: 2020-01-02

## 2020-01-02 DIAGNOSIS — J01.00 ACUTE NON-RECURRENT MAXILLARY SINUSITIS: Primary | ICD-10-CM

## 2020-01-02 RX ORDER — DOXYCYCLINE 100 MG/1
100 TABLET ORAL 2 TIMES DAILY
Qty: 14 TAB | Refills: 0 | Status: SHIPPED | OUTPATIENT
Start: 2020-01-02 | End: 2020-01-09

## 2020-01-02 NOTE — TELEPHONE ENCOUNTER
Pt called stating her sinus symptoms have gotten worse. Have now been present for 2 weeks. No available appts today. Will send in rx for doxycyline given her PCN and Ceclor allergies. If symptoms not improving she will come in for an appt. All questions answered and pt feels comfortable with the plan of care.

## 2020-01-14 DIAGNOSIS — F50.81 BINGE EATING DISORDER: ICD-10-CM

## 2020-02-13 DIAGNOSIS — F50.81 BINGE EATING DISORDER: ICD-10-CM

## 2020-03-16 DIAGNOSIS — F50.81 BINGE EATING DISORDER: ICD-10-CM

## 2020-04-15 DIAGNOSIS — F50.81 BINGE EATING DISORDER: ICD-10-CM

## 2020-05-15 DIAGNOSIS — F50.81 BINGE EATING DISORDER: ICD-10-CM

## 2020-06-24 DIAGNOSIS — I10 ESSENTIAL HYPERTENSION: Primary | ICD-10-CM

## 2020-06-25 RX ORDER — HYDROCHLOROTHIAZIDE 12.5 MG/1
12.5 TABLET ORAL DAILY
Qty: 30 TAB | Refills: 5 | Status: SHIPPED | OUTPATIENT
Start: 2020-06-25

## 2020-07-06 ENCOUNTER — TELEPHONE (OUTPATIENT)
Dept: FAMILY MEDICINE CLINIC | Age: 40
End: 2020-07-06

## 2020-07-06 DIAGNOSIS — F41.1 GENERALIZED ANXIETY DISORDER: Primary | ICD-10-CM

## 2020-07-06 RX ORDER — BUPROPION HYDROCHLORIDE 150 MG/1
TABLET ORAL
Qty: 60 TAB | Refills: 0 | Status: SHIPPED | OUTPATIENT
Start: 2020-07-06 | End: 2020-08-06 | Stop reason: DRUGHIGH

## 2020-07-16 DIAGNOSIS — F50.81 BINGE EATING DISORDER: ICD-10-CM

## 2020-08-06 RX ORDER — BUPROPION HYDROCHLORIDE 300 MG/1
300 TABLET ORAL
Qty: 90 TAB | Refills: 1 | Status: SHIPPED | OUTPATIENT
Start: 2020-08-06 | End: 2020-08-07 | Stop reason: SDUPTHER

## 2020-08-10 RX ORDER — BUPROPION HYDROCHLORIDE 300 MG/1
300 TABLET ORAL
Qty: 90 TAB | Refills: 1 | Status: SHIPPED | OUTPATIENT
Start: 2020-08-10

## 2020-08-13 ENCOUNTER — OFFICE VISIT (OUTPATIENT)
Dept: FAMILY MEDICINE CLINIC | Age: 40
End: 2020-08-13
Payer: COMMERCIAL

## 2020-08-13 VITALS
OXYGEN SATURATION: 100 % | BODY MASS INDEX: 33.99 KG/M2 | TEMPERATURE: 98.2 F | RESPIRATION RATE: 16 BRPM | DIASTOLIC BLOOD PRESSURE: 84 MMHG | HEART RATE: 55 BPM | WEIGHT: 204 LBS | SYSTOLIC BLOOD PRESSURE: 121 MMHG | HEIGHT: 65 IN

## 2020-08-13 DIAGNOSIS — I10 ESSENTIAL HYPERTENSION: ICD-10-CM

## 2020-08-13 DIAGNOSIS — R45.86 EMOTIONAL LABILITY: ICD-10-CM

## 2020-08-13 DIAGNOSIS — N94.6 DYSMENORRHEA: Primary | ICD-10-CM

## 2020-08-13 PROCEDURE — 58301 REMOVE INTRAUTERINE DEVICE: CPT | Performed by: FAMILY MEDICINE

## 2020-08-13 PROCEDURE — 99214 OFFICE O/P EST MOD 30 MIN: CPT | Performed by: FAMILY MEDICINE

## 2020-08-13 RX ORDER — ACETAMINOPHEN AND CODEINE PHOSPHATE 120; 12 MG/5ML; MG/5ML
1 SOLUTION ORAL DAILY
Qty: 1 PACKAGE | Refills: 1 | Status: SHIPPED | OUTPATIENT
Start: 2020-08-13 | End: 2020-10-05

## 2020-08-13 NOTE — PATIENT INSTRUCTIONS
IUD Removal: Care Instructions Your Care Instructions The intrauterine device (IUD) is a method of birth control. It is a small, plastic, T-shaped device that contains copper or hormones. It is placed in your uterus. You may have had your IUD removed because you want to become pregnant. Or maybe it caused pain, bleeding, or an infection. You may have chosen another method of birth control. If you don't want to get pregnant, make sure to use another form of birth control now that your IUD is not in place. Talk to your doctor about other forms of birth control. Follow-up care is a key part of your treatment and safety. Be sure to make and go to all appointments, and call your doctor if you are having problems. It's also a good idea to know your test results and keep a list of the medicines you take. How can you care for yourself at home? · IUD removal does not usually cause any pain or problems if the IUD is removed because you want to become pregnant or because of bleeding. · Once the IUD is taken out, you can become pregnant. If you want to become pregnant, you can start trying to have a baby as soon as you like. · If your doctor prescribed antibiotics because of an infection, take them as directed. Do not stop taking them just because you feel better. You need to take the full course of antibiotics. When should you call for help? Call your doctor now or seek immediate medical care if: 
· You have pain in your belly or pelvis. · You have severe vaginal bleeding. This means that you are soaking through your usual pads or tampons every hour for 2 or more hours. · You have a fever. · You have a vaginal discharge that smells bad. Watch closely for changes in your health, and be sure to contact your doctor if you have any problems. Where can you learn more? Go to http://mary kay-yung.info/ Enter S939 in the search box to learn more about \"IUD Removal: Care Instructions. \" 
 Current as of: February 11, 2020               Content Version: 12.5 © 7933-4865 [employer]. Care instructions adapted under license by [employer] (which disclaims liability or warranty for this information). If you have questions about a medical condition or this instruction, always ask your healthcare professional. [address] any warranty or liability for your use of this information.

## 2020-08-13 NOTE — PROGRESS NOTES
1. Have you been to the ER, urgent care clinic since your last visit? Hospitalized since your last visit? No    2. Have you seen or consulted any other health care providers outside of the [address] [place] [address] since your last visit? Include any pap smears or colon screening.  No  Reviewed record in preparation for visit and have necessary documentation  Pt did not bring medication to office visit for review    Goals that were addressed and/or need to be completed during or after this appointment include   Health Maintenance Due   Topic Date Due    Pneumococcal 0-64 years (1 of 1 - PPSV23) 04/26/1987    Influenza Age 5 to Adult  08/01/2020

## 2020-08-13 NOTE — PROGRESS NOTES
CC: f/u depression    HPI: Pt is a 45 y.o. female who presents for f/u depression. She has been taking Wellbutrin for the past month or so and has increased to the 300mg dose. She feels like she is very emotional around her periods and irritable, but otherwise her mood feels improved. She denies SI/HI. She is wondering if the emotional lability could be coming from her IUD. She has tried multiple forms of birth control in the past and noted that when she was on a combined OCP she did not seem to have the emotional lability. She would like to have the IUD out and switch to something else. She currently smokes 2 cigarettes per day and is working on quitting altogether. She has no personal or FH of VTE and does not have migraines. She has HTN that is well-controlled on 12.5mg HCTZ daily. She has continued to have monthly periods with the IUD and started her period yesterday.        Past Medical History:   Diagnosis Date    Anemia 5/15/2018    Anemia     Essential hypertension     10 years ago with first pregnancy    Granuloma of skin 2018       Family History   Problem Relation Age of Onset    Breast Cancer Mother     Heart Disease Mother     Cancer Father        Social History     Tobacco Use    Smoking status: Current Every Day Smoker     Packs/day: 0.25     Years: 10.00     Pack years: 2.50     Types: Cigarettes     Last attempt to quit: 2015     Years since quittin.1    Smokeless tobacco: Never Used   Substance Use Topics    Alcohol use: No     Alcohol/week: 0.0 standard drinks    Drug use: No       ROS:  Per HPI    PE:  Visit Vitals  /84 (BP 1 Location: Right arm, BP Patient Position: Sitting)   Pulse (!) 55   Temp 98.2 °F (36.8 °C) (Oral)   Resp 16   Ht 5' 5\" (1.651 m)   Wt 204 lb (92.5 kg)   SpO2 100%   BMI 33.95 kg/m²     Gen: Pt sitting in chair, in NAD  Head: Normocephalic, atraumatic  Eyes: Sclera anicteric, EOM grossly intact, PERRL  Throat: MMM, normal lips, tongue, teeth and gums  Neck: Supple, no LAD, no thyromegaly or carotid bruits  CVS: Normal S1, S2, no m/r/g  Resp: CTAB, no wheezes or rales  : Normal external female genitalia. No vaginal discharge. Small amount of bleeding from the cervix. Cervix without obvious lesions. IUD strings in place. Extrem: Atraumatic, no cyanosis or edema  Pulses: 2+   Skin: Warm, dry  Neuro: Alert, oriented, appropriate    IUD Removal  Pt counseled on risks of procedure and decision made to proceed. Patient placed in dorsal lithotomy position and speculum inserted. Cervix fully visualized with two blue strings in place. Attempted to grasp strings with ring forceps but forceps closed crooked and were not able to hold the strings with tension. Strings then grasped with curved forceps and IUD removed, inspected and found to be intact. Pt educated on signs to watch out for and for which to call the clinic including but not limited to fevers and heavy vaginal bleeding or bleeding that does not stop within 7 days. She tolerated the procedure well without complications. A/P:   Encounter Diagnoses     ICD-10-CM ICD-9-CM   1. Dysmenorrhea  N94.6 625.3   2. Emotional lability  R45.86 799.24   3. Essential hypertension  I10 401.9     1. Dysmenorrhea: Discussed options and risks/benefits of each given her age, well-controlled HTN, and smoking status. Decision made to start micronor for now and if she is able to completely stop smoking in the near future, could switch back to the LoEstrin she had taken in the past. Pt in agreement. Discussed recommendations for back-up birth control as her IUD was removed today and advised she could become pregnant as soon as today. - norethindrone (MICRONOR) 0.35 mg tab; Take 1 Tab by mouth daily. Dispense: 1 Package; Refill: 1  - REMOVE INTRAUTERINE DEVICE    2.  Emotional lability: Discussed options of increasgin Wellbutrin vs switching to Lexapro which she tried in the past vs watching and waiting to see how she feels after 2 full months on the Wellbutrin 300mg daily and with the IUD out. She would prefer to watch and wait at this time. Will also check TSH given these new symptoms and h/o HTN.   - TSH 3RD GENERATION; Future    3. Essential hypertension: Well-controlled. Pt had not taken medication yet today. She will get her labs drawn today.   - TSH 3RD GENERATION; Future       RTC in 6 months for f/u chronic conditions, or sooner prn    Discussed diagnoses in detail with patient. Medication risks/benefits/side effects discussed with patient. All of the patient's questions were addressed. The patient understands and agrees with our plan of care. The patient knows to call back if they are unsure of or forget any changes we discussed today or if the symptoms change. The patient received an After-Visit Summary which contains VS, orders, medication list and allergy list. This can be used as a \"mini-medical record\" should they have to seek medical care while out of town. Current Outpatient Medications on File Prior to Visit   Medication Sig Dispense Refill    buPROPion XL (WELLBUTRIN XL) 300 mg XL tablet Take 1 Tab by mouth every morning. 90 Tab 1    lisdexamfetamine (Vyvanse) 50 mg cap Take 1 Cap by mouth daily. Max Daily Amount: 50 mg. 30 Cap 0    hydroCHLOROthiazide (HYDRODIURIL) 12.5 mg tablet Take 1 Tab by mouth daily. 30 Tab 5    cetirizine (ZYRTEC) 10 mg tablet Take 1 Tab by mouth daily. 90 Tab 3     No current facility-administered medications on file prior to visit.

## 2020-08-14 ENCOUNTER — HOSPITAL ENCOUNTER (OUTPATIENT)
Dept: LAB | Age: 40
Discharge: HOME | End: 2020-08-14

## 2020-08-14 DIAGNOSIS — I10 ESSENTIAL HYPERTENSION: ICD-10-CM

## 2020-08-14 DIAGNOSIS — F50.81 BINGE EATING DISORDER: ICD-10-CM

## 2020-08-14 DIAGNOSIS — R45.86 EMOTIONAL LABILITY: ICD-10-CM

## 2020-08-14 LAB
ANION GAP SERPL CALC-SCNC: 6 MMOL/L (ref 5–15)
BUN SERPL-MCNC: 14 MG/DL (ref 6–20)
BUN/CREAT SERPL: 16 (ref 12–20)
CALCIUM SERPL-MCNC: 8.4 MG/DL (ref 8.5–10.1)
CHLORIDE SERPL-SCNC: 111 MMOL/L (ref 97–108)
CHOLEST SERPL-MCNC: 172 MG/DL
CO2 SERPL-SCNC: 24 MMOL/L (ref 21–32)
CREAT SERPL-MCNC: 0.9 MG/DL (ref 0.55–1.02)
GLUCOSE SERPL-MCNC: 87 MG/DL (ref 65–100)
HDLC SERPL-MCNC: 56 MG/DL
HDLC SERPL: 3.1 {RATIO} (ref 0–5)
LDLC SERPL CALC-MCNC: 94.8 MG/DL (ref 0–100)
LIPID PROFILE,FLP: NORMAL
POTASSIUM SERPL-SCNC: 4 MMOL/L (ref 3.5–5.1)
SODIUM SERPL-SCNC: 141 MMOL/L (ref 136–145)
TRIGL SERPL-MCNC: 106 MG/DL (ref ?–150)
TSH SERPL DL<=0.05 MIU/L-ACNC: 1.09 UIU/ML (ref 0.36–3.74)
VLDLC SERPL CALC-MCNC: 21.2 MG/DL

## 2020-10-10 ENCOUNTER — TELEPHONE (OUTPATIENT)
Dept: FAMILY MEDICINE CLINIC | Age: 40
End: 2020-10-10

## 2020-10-10 DIAGNOSIS — F50.81 BINGE EATING DISORDER: ICD-10-CM

## 2020-10-10 NOTE — TELEPHONE ENCOUNTER
Pt called stating medication was sent to wrong pharmacy. Need it sent to [facility]. Rx sent.

## 2020-11-09 ENCOUNTER — TELEPHONE (OUTPATIENT)
Dept: FAMILY MEDICINE CLINIC | Age: 40
End: 2020-11-09

## 2020-11-09 DIAGNOSIS — N30.00 ACUTE CYSTITIS WITHOUT HEMATURIA: Primary | ICD-10-CM

## 2020-11-09 RX ORDER — CEFDINIR 300 MG/1
300 CAPSULE ORAL 2 TIMES DAILY
Qty: 10 CAP | Refills: 0 | Status: SHIPPED | OUTPATIENT
Start: 2020-11-09 | End: 2020-11-14

## 2020-12-15 ENCOUNTER — TRANSCRIBE ORDER (OUTPATIENT)
Dept: SCHEDULING | Age: 40
End: 2020-12-15

## 2020-12-15 DIAGNOSIS — Z12.31 SCREENING MAMMOGRAM FOR HIGH-RISK PATIENT: Primary | ICD-10-CM

## 2021-01-13 ENCOUNTER — HOSPITAL ENCOUNTER (OUTPATIENT)
Dept: MAMMOGRAPHY | Age: 41
Discharge: HOME | End: 2021-01-13
Attending: FAMILY MEDICINE
Payer: COMMERCIAL

## 2021-01-13 DIAGNOSIS — Z12.31 SCREENING MAMMOGRAM FOR HIGH-RISK PATIENT: ICD-10-CM

## 2021-01-13 PROCEDURE — 77067 SCR MAMMO BI INCL CAD: CPT

## 2022-01-18 ENCOUNTER — TRANSCRIBE ORDER (OUTPATIENT)
Dept: SCHEDULING | Age: 42
End: 2022-01-18

## 2022-01-18 DIAGNOSIS — Z12.31 VISIT FOR SCREENING MAMMOGRAM: Primary | ICD-10-CM

## 2022-02-24 ENCOUNTER — HOSPITAL ENCOUNTER (OUTPATIENT)
Dept: MAMMOGRAPHY | Age: 42
Discharge: HOME | End: 2022-02-24
Payer: COMMERCIAL

## 2022-02-24 DIAGNOSIS — Z12.31 VISIT FOR SCREENING MAMMOGRAM: ICD-10-CM

## 2022-02-24 PROCEDURE — 77067 SCR MAMMO BI INCL CAD: CPT

## 2022-03-19 PROBLEM — E66.811 CLASS 1 OBESITY DUE TO EXCESS CALORIES WITHOUT SERIOUS COMORBIDITY WITH BODY MASS INDEX (BMI) OF 34.0 TO 34.9 IN ADULT: Status: ACTIVE | Noted: 2019-05-07

## 2022-03-19 PROBLEM — L98.0 PYOGENIC GRANULOMA: Status: ACTIVE | Noted: 2018-05-15

## 2022-03-19 PROBLEM — Z98.891 HISTORY OF C-SECTION: Status: ACTIVE | Noted: 2018-05-01

## 2022-03-19 PROBLEM — Z72.0 TOBACCO ABUSE: Status: ACTIVE | Noted: 2017-10-19

## 2022-03-19 PROBLEM — I10 ESSENTIAL HYPERTENSION: Status: ACTIVE | Noted: 2019-02-01

## 2022-03-19 PROBLEM — E66.09 CLASS 1 OBESITY DUE TO EXCESS CALORIES WITHOUT SERIOUS COMORBIDITY WITH BODY MASS INDEX (BMI) OF 34.0 TO 34.9 IN ADULT: Status: ACTIVE | Noted: 2019-05-07

## 2022-03-19 PROBLEM — Z87.59 HISTORY OF PRE-ECLAMPSIA: Status: ACTIVE | Noted: 2017-10-19

## 2022-03-19 PROBLEM — K21.9 GASTROESOPHAGEAL REFLUX DISEASE WITHOUT ESOPHAGITIS: Status: ACTIVE | Noted: 2017-10-19

## 2022-03-20 PROBLEM — D64.9 ANEMIA: Status: ACTIVE | Noted: 2018-05-15

## 2022-07-18 NOTE — PROGRESS NOTES
CRL consistent with LMP. Good cardiac activity noted. Recommendations: Pt to follow up in 5 weeks for first trimester ultrasound . Printed information for her on Preseptal cellulitis.

## 2022-12-22 NOTE — PROGRESS NOTES
Pt notified of normal results. Detail Level: Detailed Topical Sulfur Applications Pregnancy And Lactation Text: This medication is Pregnancy Category C and has an unknown safety profile during pregnancy. It is unknown if this topical medication is excreted in breast milk. Minocycline Counseling: Patient advised regarding possible photosensitivity and discoloration of the teeth, skin, lips, tongue and gums. Patient instructed to avoid sunlight, if possible. When exposed to sunlight, patients should wear protective clothing, sunglasses, and sunscreen. The patient was instructed to call the office immediately if the following severe adverse effects occur:  hearing changes, easy bruising/bleeding, severe headache, or vision changes. The patient verbalized understanding of the proper use and possible adverse effects of minocycline. All of the patient's questions and concerns were addressed. Doxycycline Pregnancy And Lactation Text: This medication is Pregnancy Category D and not consider safe during pregnancy. It is also excreted in breast milk but is considered safe for shorter treatment courses. Tetracycline Pregnancy And Lactation Text: This medication is Pregnancy Category D and not consider safe during pregnancy. It is also excreted in breast milk. Topical Retinoid counseling:  Patient advised to apply a pea-sized amount only at bedtime and wait 30 minutes after washing their face before applying. If too drying, patient may add a non-comedogenic moisturizer. The patient verbalized understanding of the proper use and possible adverse effects of retinoids. All of the patient's questions and concerns were addressed. Birth Control Pills Pregnancy And Lactation Text: This medication should be avoided if pregnant and for the first 30 days post-partum. Spironolactone Pregnancy And Lactation Text: This medication can cause feminization of the male fetus and should be avoided during pregnancy. The active metabolite is also found in breast milk. Benzoyl Peroxide Counseling: Patient counseled that medicine may cause skin irritation and bleach clothing. In the event of skin irritation, the patient was advised to reduce the amount of the drug applied or use it less frequently. The patient verbalized understanding of the proper use and possible adverse effects of benzoyl peroxide. All of the patient's questions and concerns were addressed. Topical Clindamycin Pregnancy And Lactation Text: This medication is Pregnancy Category B and is considered safe during pregnancy. It is unknown if it is excreted in breast milk. Bactrim Pregnancy And Lactation Text: This medication is Pregnancy Category D and is known to cause fetal risk. It is also excreted in breast milk. High Dose Vitamin A Counseling: Side effects reviewed, pt to contact office should one occur. Detail Level: Zone Tazorac Pregnancy And Lactation Text: This medication is not safe during pregnancy. It is unknown if this medication is excreted in breast milk. Isotretinoin Counseling: Patient should get monthly blood tests, not donate blood, not drive at night if vision affected, not share medication, and not undergo elective surgery for 6 months after tx completed. Side effects reviewed, pt to contact office should one occur. Include Pregnancy/Lactation Warning?: No Topical Retinoid Pregnancy And Lactation Text: This medication is Pregnancy Category C. It is unknown if this medication is excreted in breast milk. Aklief counseling:  Patient advised to apply a pea-sized amount only at bedtime and wait 30 minutes after washing their face before applying. If too drying, patient may add a non-comedogenic moisturizer. The most commonly reported side effects including irritation, redness, scaling, dryness, stinging, burning, itching, and increased risk of sunburn. The patient verbalized understanding of the proper use and possible adverse effects of retinoids. All of the patient's questions and concerns were addressed. Azelaic Acid Counseling: Patient counseled that medicine may cause skin irritation and to avoid applying near the eyes. In the event of skin irritation, the patient was advised to reduce the amount of the drug applied or use it less frequently. The patient verbalized understanding of the proper use and possible adverse effects of azelaic acid. All of the patient's questions and concerns were addressed. Azithromycin Pregnancy And Lactation Text: This medication is considered safe during pregnancy and is also secreted in breast milk. Doxycycline Counseling:  Patient counseled regarding possible photosensitivity and increased risk for sunburn. Patient instructed to avoid sunlight, if possible. When exposed to sunlight, patients should wear protective clothing, sunglasses, and sunscreen. The patient was instructed to call the office immediately if the following severe adverse effects occur:  hearing changes, easy bruising/bleeding, severe headache, or vision changes. The patient verbalized understanding of the proper use and possible adverse effects of doxycycline. All of the patient's questions and concerns were addressed. Erythromycin Counseling:  I discussed with the patient the risks of erythromycin including but not limited to GI upset, allergic reaction, drug rash, diarrhea, increase in liver enzymes, and yeast infections. High Dose Vitamin A Pregnancy And Lactation Text: High dose vitamin A therapy is contraindicated during pregnancy and breast feeding. Benzoyl Peroxide Pregnancy And Lactation Text: This medication is Pregnancy Category C. It is unknown if benzoyl peroxide is excreted in breast milk. Tetracycline Counseling: Patient counseled regarding possible photosensitivity and increased risk for sunburn. Patient instructed to avoid sunlight, if possible. When exposed to sunlight, patients should wear protective clothing, sunglasses, and sunscreen. The patient was instructed to call the office immediately if the following severe adverse effects occur:  hearing changes, easy bruising/bleeding, severe headache, or vision changes. The patient verbalized understanding of the proper use and possible adverse effects of tetracycline. All of the patient's questions and concerns were addressed. Patient understands to avoid pregnancy while on therapy due to potential birth defects. Topical Sulfur Applications Counseling: Topical Sulfur Counseling: Patient counseled that this medication may cause skin irritation or allergic reactions. In the event of skin irritation, the patient was advised to reduce the amount of the drug applied or use it less frequently. The patient verbalized understanding of the proper use and possible adverse effects of topical sulfur application. All of the patient's questions and concerns were addressed. Dapsone Counseling: I discussed with the patient the risks of dapsone including but not limited to hemolytic anemia, agranulocytosis, rashes, methemoglobinemia, kidney failure, peripheral neuropathy, headaches, GI upset, and liver toxicity. Patients who start dapsone require monitoring including baseline LFTs and weekly CBCs for the first month, then every month thereafter. The patient verbalized understanding of the proper use and possible adverse effects of dapsone. All of the patient's questions and concerns were addressed. Winlevi Counseling:  I discussed with the patient the risks of topical clascoterone including but not limited to erythema, scaling, itching, and stinging. Patient voiced their understanding. Birth Control Pills Counseling: Birth Control Pill Counseling: I discussed with the patient the potential side effects of OCPs including but not limited to increased risk of stroke, heart attack, thrombophlebitis, deep venous thrombosis, hepatic adenomas, breast changes, GI upset, headaches, and depression. The patient verbalized understanding of the proper use and possible adverse effects of OCPs. All of the patient's questions and concerns were addressed. Erythromycin Pregnancy And Lactation Text: This medication is Pregnancy Category B and is considered safe during pregnancy. It is also excreted in breast milk. Sarecycline Counseling: Patient advised regarding possible photosensitivity and discoloration of the teeth, skin, lips, tongue and gums. Patient instructed to avoid sunlight, if possible. When exposed to sunlight, patients should wear protective clothing, sunglasses, and sunscreen. The patient was instructed to call the office immediately if the following severe adverse effects occur:  hearing changes, easy bruising/bleeding, severe headache, or vision changes. The patient verbalized understanding of the proper use and possible adverse effects of sarecycline. All of the patient's questions and concerns were addressed. Isotretinoin Pregnancy And Lactation Text: This medication is Pregnancy Category X and is considered extremely dangerous during pregnancy. It is unknown if it is excreted in breast milk. Azelaic Acid Pregnancy And Lactation Text: This medication is considered safe during pregnancy and breast feeding. Bactrim Counseling:  I discussed with the patient the risks of sulfa antibiotics including but not limited to GI upset, allergic reaction, drug rash, diarrhea, dizziness, photosensitivity, and yeast infections. Rarely, more serious reactions can occur including but not limited to aplastic anemia, agranulocytosis, methemoglobinemia, blood dyscrasias, liver or kidney failure, lung infiltrates or desquamative/blistering drug rashes. Spironolactone Counseling: Patient advised regarding risks of diarrhea, abdominal pain, hyperkalemia, birth defects (for female patients), liver toxicity and renal toxicity. The patient may need blood work to monitor liver and kidney function and potassium levels while on therapy. The patient verbalized understanding of the proper use and possible adverse effects of spironolactone. All of the patient's questions and concerns were addressed. Topical Clindamycin Counseling: Patient counseled that this medication may cause skin irritation or allergic reactions. In the event of skin irritation, the patient was advised to reduce the amount of the drug applied or use it less frequently. The patient verbalized understanding of the proper use and possible adverse effects of clindamycin. All of the patient's questions and concerns were addressed. Aklief Pregnancy And Lactation Text: It is unknown if this medication is safe to use during pregnancy. It is unknown if this medication is excreted in breast milk. Breastfeeding women should use the topical cream on the smallest area of the skin for the shortest time needed while breastfeeding. Do not apply to nipple and areola. Tazorac Counseling:  Patient advised that medication is irritating and drying. Patient may need to apply sparingly and wash off after an hour before eventually leaving it on overnight. The patient verbalized understanding of the proper use and possible adverse effects of tazorac. All of the patient's questions and concerns were addressed. Winlevi Pregnancy And Lactation Text: This medication is considered safe during pregnancy and breastfeeding. Azithromycin Counseling:  I discussed with the patient the risks of azithromycin including but not limited to GI upset, allergic reaction, drug rash, diarrhea, and yeast infections. Dapsone Pregnancy And Lactation Text: This medication is Pregnancy Category C and is not considered safe during pregnancy or breast feeding. Detail Level: Generalized

## 2023-01-30 ENCOUNTER — TRANSCRIBE ORDER (OUTPATIENT)
Dept: SCHEDULING | Age: 43
End: 2023-01-30

## 2023-01-30 DIAGNOSIS — Z12.31 VISIT FOR SCREENING MAMMOGRAM: Primary | ICD-10-CM

## 2023-02-28 NOTE — PROGRESS NOTES
Audrey Gillespie is a 40 y.o. female returns for an annual exam     Chief Complaint   Patient presents with    Annual Exam       Patient's last menstrual period was 02/23/2023. Her periods are normal in flow and usually regular with a 26-32 day interval with 3-7 day duration. She does not have dysmenorrhea. Problems:  pain on left side of lower abdomen   Birth Control: none. Last Pap: normal obtained 4 year(s) ago. She does not have a history of AVERY 2, 3 or cervical cancer. Last Mammogram: mammogram done today down stairs. 1. Have you been to the ER, urgent care clinic, or hospitalized since your last visit? No    2. Have you seen or consulted any other health care providers outside of the [address] [place] [address] since your last visit? No    Examination chaperoned by Chhaya Dominguez LPN.

## 2023-03-02 ENCOUNTER — OFFICE VISIT (OUTPATIENT)
Dept: OBGYN CLINIC | Age: 43
End: 2023-03-02
Payer: COMMERCIAL

## 2023-03-02 ENCOUNTER — HOSPITAL ENCOUNTER (OUTPATIENT)
Dept: MAMMOGRAPHY | Age: 43
Discharge: HOME | End: 2023-03-02
Payer: COMMERCIAL

## 2023-03-02 VITALS — WEIGHT: 205.8 LBS | DIASTOLIC BLOOD PRESSURE: 85 MMHG | SYSTOLIC BLOOD PRESSURE: 144 MMHG | BODY MASS INDEX: 34.25 KG/M2

## 2023-03-02 DIAGNOSIS — Z01.419 ENCOUNTER FOR GYNECOLOGICAL EXAMINATION WITHOUT ABNORMAL FINDING: Primary | ICD-10-CM

## 2023-03-02 DIAGNOSIS — Z12.31 VISIT FOR SCREENING MAMMOGRAM: ICD-10-CM

## 2023-03-02 DIAGNOSIS — Z12.4 ENCOUNTER FOR PAPANICOLAOU SMEAR FOR CERVICAL CANCER SCREENING: ICD-10-CM

## 2023-03-02 PROCEDURE — 77067 SCR MAMMO BI INCL CAD: CPT

## 2023-03-02 PROCEDURE — 3079F DIAST BP 80-89 MM HG: CPT | Performed by: OBSTETRICS & GYNECOLOGY

## 2023-03-02 PROCEDURE — 3077F SYST BP >= 140 MM HG: CPT | Performed by: OBSTETRICS & GYNECOLOGY

## 2023-03-02 PROCEDURE — 99396 PREV VISIT EST AGE 40-64: CPT | Performed by: OBSTETRICS & GYNECOLOGY

## 2023-03-02 RX ORDER — NORETHINDRONE ACETATE AND ETHINYL ESTRADIOL 1MG-20(21)
1 KIT ORAL DAILY
Qty: 3 DOSE PACK | Refills: 4 | Status: SHIPPED | OUTPATIENT
Start: 2023-03-02
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
## 2024-02-20 ENCOUNTER — TRANSCRIBE ORDERS (OUTPATIENT)
Facility: HOSPITAL | Age: 44
End: 2024-02-20

## 2024-02-20 DIAGNOSIS — Z12.31 SCREENING MAMMOGRAM FOR HIGH-RISK PATIENT: Primary | ICD-10-CM

## 2024-04-25 ENCOUNTER — OFFICE VISIT (OUTPATIENT)
Age: 44
End: 2024-04-25
Payer: COMMERCIAL

## 2024-04-25 ENCOUNTER — HOSPITAL ENCOUNTER (OUTPATIENT)
Facility: HOSPITAL | Age: 44
Discharge: HOME | End: 2024-04-25
Payer: COMMERCIAL

## 2024-04-25 VITALS
SYSTOLIC BLOOD PRESSURE: 117 MMHG | DIASTOLIC BLOOD PRESSURE: 81 MMHG | BODY MASS INDEX: 34.26 KG/M2 | WEIGHT: 205.6 LBS | HEIGHT: 65 IN

## 2024-04-25 VITALS — WEIGHT: 198 LBS | BODY MASS INDEX: 32.99 KG/M2 | HEIGHT: 65 IN

## 2024-04-25 DIAGNOSIS — Z12.31 SCREENING MAMMOGRAM FOR HIGH-RISK PATIENT: ICD-10-CM

## 2024-04-25 DIAGNOSIS — Z01.419 ENCOUNTER FOR GYNECOLOGICAL EXAMINATION (GENERAL) (ROUTINE) WITHOUT ABNORMAL FINDINGS: Primary | ICD-10-CM

## 2024-04-25 PROCEDURE — 3074F SYST BP LT 130 MM HG: CPT | Performed by: OBSTETRICS & GYNECOLOGY

## 2024-04-25 PROCEDURE — 77063 BREAST TOMOSYNTHESIS BI: CPT

## 2024-04-25 PROCEDURE — 3079F DIAST BP 80-89 MM HG: CPT | Performed by: OBSTETRICS & GYNECOLOGY

## 2024-04-25 PROCEDURE — 99396 PREV VISIT EST AGE 40-64: CPT | Performed by: OBSTETRICS & GYNECOLOGY

## 2024-04-25 RX ORDER — NORETHINDRONE ACETATE AND ETHINYL ESTRADIOL 1MG-20(21)
1 KIT ORAL DAILY
Qty: 3 PACKET | Refills: 4 | Status: SHIPPED | OUTPATIENT
Start: 2024-04-25

## 2024-04-25 RX ORDER — HYDROCHLOROTHIAZIDE 25 MG/1
25 TABLET ORAL DAILY
COMMUNITY
Start: 2024-04-08

## 2024-04-25 SDOH — ECONOMIC STABILITY: HOUSING INSECURITY
IN THE LAST 12 MONTHS, WAS THERE A TIME WHEN YOU DID NOT HAVE A STEADY PLACE TO SLEEP OR SLEPT IN A SHELTER (INCLUDING NOW)?: NO

## 2024-04-25 SDOH — ECONOMIC STABILITY: FOOD INSECURITY: WITHIN THE PAST 12 MONTHS, THE FOOD YOU BOUGHT JUST DIDN'T LAST AND YOU DIDN'T HAVE MONEY TO GET MORE.: NEVER TRUE

## 2024-04-25 SDOH — ECONOMIC STABILITY: FOOD INSECURITY: WITHIN THE PAST 12 MONTHS, YOU WORRIED THAT YOUR FOOD WOULD RUN OUT BEFORE YOU GOT MONEY TO BUY MORE.: NEVER TRUE

## 2024-04-25 SDOH — ECONOMIC STABILITY: TRANSPORTATION INSECURITY
IN THE PAST 12 MONTHS, HAS LACK OF TRANSPORTATION KEPT YOU FROM MEETINGS, WORK, OR FROM GETTING THINGS NEEDED FOR DAILY LIVING?: NO

## 2024-04-25 SDOH — ECONOMIC STABILITY: INCOME INSECURITY: HOW HARD IS IT FOR YOU TO PAY FOR THE VERY BASICS LIKE FOOD, HOUSING, MEDICAL CARE, AND HEATING?: NOT HARD AT ALL

## 2024-04-25 ASSESSMENT — PATIENT HEALTH QUESTIONNAIRE - PHQ9
SUM OF ALL RESPONSES TO PHQ QUESTIONS 1-9: 1
2. FEELING DOWN, DEPRESSED OR HOPELESS: NOT AT ALL
SUM OF ALL RESPONSES TO PHQ QUESTIONS 1-9: 1
1. LITTLE INTEREST OR PLEASURE IN DOING THINGS: SEVERAL DAYS
SUM OF ALL RESPONSES TO PHQ9 QUESTIONS 1 & 2: 1

## 2024-04-25 NOTE — PROGRESS NOTES
Jolie Rick is a 43 y.o. female returns for an annual exam     No chief complaint on file.      No LMP recorded.  Her periods are moderate in flow and usually regular with a 26-32 day interval with 3-7 day duration.  She does not have dysmenorrhea.  Problems: no problems  Birth Control: OCP (estrogen/progesterone).  Last Pap: normal obtained 1 year(s) ago.  She does not have a history of CRYSTAL 2, 3 or cervical cancer.   Last Mammogram:  mammogram done down stairs  .           1. Have you been to the ER, urgent care clinic, or hospitalized since your last visit? No    2. Have you seen or consulted any other health care providers outside of the [facility] since your last visit? No    Examination chaperoned by aPvel Coley LPN.

## 2024-04-25 NOTE — PROGRESS NOTES
Annual exam      Jolie Rick is a ,  43 y.o. female   Patient's last menstrual period was 2024.    She presents for her annual checkup.     She is having no problems. Building a house in [place].    Menstrual status:    Her periods are moderate in flow and usually regular with a 26-32 day interval with 3-7 day duration.  She does not have dysmenorrhea.    Sexual history:    She  reports that she is not currently sexually active and has had partner(s) who are male. She reports using the following method of birth control/protection: OCP.    Per Nursing Note:  Last Pap: normal obtained 1 year(s) ago.  She does not have a history of CRYSTAL 2, 3 or cervical cancer.   Last Mammogram:  mammogram done down stairs     Past Medical History:   Diagnosis Date    Anemia 5/15/2018    Anemia     Essential hypertension     10 years ago with first pregnancy    Granuloma of skin 2018     Past Surgical History:   Procedure Laterality Date     DELIVERY ONLY      for hypertension and macrosomia    OTHER SURGICAL HISTORY  2018    granuloma removed from under right breast       Current Outpatient Medications   Medication Sig Dispense Refill    hydroCHLOROthiazide (HYDRODIURIL) 25 MG tablet Take 1 tablet by mouth daily      buPROPion (WELLBUTRIN XL) 300 MG extended release tablet Take 1 tablet by mouth      cetirizine (ZYRTEC) 10 MG tablet Take 1 tablet by mouth daily      norethindrone-ethinyl estradiol (JUNEL ) 1-20 MG-MCG per tablet Take 1 tablet by mouth daily       No current facility-administered medications for this visit.     Allergies: Sulfamethoxazole-trimethoprim, Cefaclor, and Penicillin g     Tobacco History:  reports that she has been smoking cigarettes. She has never used smokeless tobacco.  Alcohol Abuse:  reports no history of alcohol use.  Drug Abuse:  reports no history of drug use.    Family Medical/Cancer History:   Family History   Problem Relation Age of Onset

## 2025-03-31 RX ORDER — NORETHINDRONE ACETATE AND ETHINYL ESTRADIOL AND FERROUS FUMARATE 1MG-20(21)
1 KIT ORAL DAILY
Qty: 3 PACKET | Refills: 1 | Status: SHIPPED | OUTPATIENT
Start: 2025-03-31

## 2025-03-31 NOTE — TELEPHONE ENCOUNTER
44 year old patient last seen in the office on  4/25/2024 and has next ae and mammogram on 7/10/2025 .    Prescription refill sent as per MD verbal order to get patient to her scheduled appointment.

## (undated) DEVICE — ABDOMINAL PAD: Brand: DERMACEA

## (undated) DEVICE — ROCKER SWITCH PENCIL HOLSTER: Brand: VALLEYLAB

## (undated) DEVICE — BULB SYRINGE, IRRIGATION WITH PROTECTIVE CAP, 60 CC, INDIVIDUALLY WRAPPED: Brand: DOVER

## (undated) DEVICE — X-RAY DETECTABLE SPONGES,16 PLY: Brand: VISTEC

## (undated) DEVICE — TRAY CATH OD16FR SIL URIN M STATLOK STBL DEV SURSTP

## (undated) DEVICE — SUTURE MCRYL SZ 4-0 L27IN ABSRB UD L19MM PS-2 1/2 CIR PRIM Y426H

## (undated) DEVICE — GOWN,AURORA,FABRIC-REINFORCED,X-LARGE: Brand: MEDLINE

## (undated) DEVICE — TOWEL,OR,DSP,ST,BLUE,STD,2/PK,40PK/CS: Brand: MEDLINE

## (undated) DEVICE — REM POLYHESIVE ADULT PATIENT RETURN ELECTRODE: Brand: VALLEYLAB

## (undated) DEVICE — STERILE POLYISOPRENE POWDER-FREE SURGICAL GLOVES: Brand: PROTEXIS

## (undated) DEVICE — 3M™ IOBAN™ 2 ANTIMICROBIAL INCISE DRAPE 6648EZ: Brand: IOBAN™ 2

## (undated) DEVICE — (D)STRIP SKN CLSR 0.5X4IN WHT --

## (undated) DEVICE — TIP CLEANER: Brand: VALLEYLAB

## (undated) DEVICE — SOLIDIFIER FLUID 3000 CC ABSORB

## (undated) DEVICE — SUTURE STRATAFIX SYMMETRIC PDS + SZ 1 L18IN ABSRB VLT L48MM SXPP1A400

## (undated) DEVICE — SOLUTION IV 1000ML 0.9% SOD CHL

## (undated) DEVICE — SUTURE CHROMIC GUT SZ 1 L36IN ABSRB BRN L40MM CT 1/2 CIR 915H

## (undated) DEVICE — C-SECTION II-LF: Brand: MEDLINE INDUSTRIES, INC.

## (undated) DEVICE — (D)SOL MEDC ALC ISO 70% 16OZ -- CONVERT TO ITEM 364515

## (undated) DEVICE — SUTURE VCRL SZ 3-0 L36IN ABSRB VLT CT L40MM 1/2 CIR J356H

## (undated) DEVICE — 3000CC GUARDIAN II: Brand: GUARDIAN

## (undated) DEVICE — KENDALL SCD EXPRESS SLEEVES, KNEE LENGTH, MEDIUM: Brand: KENDALL SCD

## (undated) DEVICE — MASTISOL ADHESIVE LIQ 2/3ML